# Patient Record
Sex: FEMALE | Race: WHITE | NOT HISPANIC OR LATINO | Employment: OTHER | URBAN - METROPOLITAN AREA
[De-identification: names, ages, dates, MRNs, and addresses within clinical notes are randomized per-mention and may not be internally consistent; named-entity substitution may affect disease eponyms.]

---

## 2018-01-30 ENCOUNTER — OFFICE VISIT (OUTPATIENT)
Dept: OBGYN CLINIC | Facility: HOSPITAL | Age: 73
End: 2018-01-30
Payer: MEDICARE

## 2018-01-30 ENCOUNTER — HOSPITAL ENCOUNTER (OUTPATIENT)
Dept: RADIOLOGY | Facility: HOSPITAL | Age: 73
Discharge: HOME | End: 2018-01-30
Attending: ORTHOPAEDIC SURGERY
Payer: MEDICARE

## 2018-01-30 VITALS
BODY MASS INDEX: 30.86 KG/M2 | HEIGHT: 68 IN | DIASTOLIC BLOOD PRESSURE: 90 MMHG | HEART RATE: 80 BPM | WEIGHT: 203.6 LBS | SYSTOLIC BLOOD PRESSURE: 151 MMHG

## 2018-01-30 DIAGNOSIS — M25.562 LEFT KNEE PAIN, UNSPECIFIED CHRONICITY: ICD-10-CM

## 2018-01-30 DIAGNOSIS — M25.562 LEFT KNEE PAIN, UNSPECIFIED CHRONICITY: Primary | ICD-10-CM

## 2018-01-30 DIAGNOSIS — M25.462 EFFUSION OF LEFT KNEE: ICD-10-CM

## 2018-01-30 DIAGNOSIS — M17.12 PRIMARY OSTEOARTHRITIS OF LEFT KNEE: ICD-10-CM

## 2018-01-30 PROCEDURE — 99203 OFFICE O/P NEW LOW 30 MIN: CPT | Performed by: ORTHOPAEDIC SURGERY

## 2018-01-30 PROCEDURE — 73560 X-RAY EXAM OF KNEE 1 OR 2: CPT

## 2018-01-30 PROCEDURE — 20610 DRAIN/INJ JOINT/BURSA W/O US: CPT | Performed by: ORTHOPAEDIC SURGERY

## 2018-01-30 RX ORDER — LIDOCAINE HYDROCHLORIDE 10 MG/ML
2 INJECTION, SOLUTION INFILTRATION; PERINEURAL
Status: COMPLETED | OUTPATIENT
Start: 2018-01-30 | End: 2018-01-30

## 2018-01-30 RX ORDER — BUPIVACAINE HYDROCHLORIDE 2.5 MG/ML
2 INJECTION, SOLUTION INFILTRATION; PERINEURAL
Status: COMPLETED | OUTPATIENT
Start: 2018-01-30 | End: 2018-01-30

## 2018-01-30 RX ORDER — LOSARTAN POTASSIUM 50 MG/1
75 TABLET ORAL
COMMUNITY
End: 2020-12-11

## 2018-01-30 RX ORDER — BETAMETHASONE SODIUM PHOSPHATE AND BETAMETHASONE ACETATE 3; 3 MG/ML; MG/ML
12 INJECTION, SUSPENSION INTRA-ARTICULAR; INTRALESIONAL; INTRAMUSCULAR; SOFT TISSUE
Status: COMPLETED | OUTPATIENT
Start: 2018-01-30 | End: 2018-01-30

## 2018-01-30 RX ORDER — B-COMPLEX WITH VITAMIN C
1 TABLET ORAL DAILY
COMMUNITY

## 2018-01-30 RX ADMIN — BETAMETHASONE SODIUM PHOSPHATE AND BETAMETHASONE ACETATE 12 MG: 3; 3 INJECTION, SUSPENSION INTRA-ARTICULAR; INTRALESIONAL; INTRAMUSCULAR; SOFT TISSUE at 11:03

## 2018-01-30 RX ADMIN — LIDOCAINE HYDROCHLORIDE 2 ML: 10 INJECTION, SOLUTION INFILTRATION; PERINEURAL at 11:03

## 2018-01-30 RX ADMIN — BUPIVACAINE HYDROCHLORIDE 2 ML: 2.5 INJECTION, SOLUTION INFILTRATION; PERINEURAL at 11:03

## 2018-01-30 NOTE — PROGRESS NOTES
67 y o female presents for evaluation of pain and swelling left knee  A few evenings ago while descending stairs, she felt a pop in her left knee and noted immediate onset of pain and delayed onset of swelling  Due to persistent pain and swelling she is here today for evaluation  She reports pain with the knee in a bent knee position  She feels she further irritated her knee while getting in her car to come to the office for today's appointment  She has a past orthopedic history remarkable for joint reconstructions of a number of arthritic joints, she is fearful her left knee has resulted with arthritis  Review of Systems  Review of systems negative unless otherwise specified in HPI    Past Medical History  Past Medical History:   Diagnosis Date    Hyperlipidemia     Hypertension        Past Surgical History  Past Surgical History:   Procedure Laterality Date    COLOSTOMY  2011    HERNIA REPAIR  2012/2013/2014    HIP SURGERY Right 2010    HIP SURGERY Left 2011       Current Medications  No current outpatient prescriptions on file prior to visit  No current facility-administered medications on file prior to visit  Recent Labs [facility])  @LABALLVALUEIP(HCT:3,HGB:3,PT,INR,WBC:3,ESR,CRP,GLUCOSE,HGBA1C)@      Physical exam  Examination finds gait pattern with a single-point cane  Breathing is nonlabored  Left hip moves well  Left thighs devoid of atrophy  Left knee is neutral slight varus  There are no popliteal masses  There is a modest effusion  Occurs that warmth  She is capable full straight leg raise, flexion exacerbates her pain in his limited  She is nontender along the superior pole patella  Calf compartments are soft and supple  Toes are warm, sensate, mobile    Imaging  X-rays left knee two view show patellofemoral arthritis on the lateral film, fairly well-preserved medial lateral compartments    There may be a fractured osteophyte along the superior pole of patella    Procedure  An aspiration injection was performed on the left knee today      Large joint arthrocentesis  Date/Time: 1/30/2018 11:03 AM  Consent given by: patient  Supporting Documentation  Indications: pain and joint swelling   Procedure Details  Location: knee - L knee  Preparation: Patient was prepped and draped in the usual sterile fashion  Needle size: 16 G  Approach: lateral  Medications administered: 2 mL bupivacaine 0 25 %; 2 mL lidocaine 1 %; 12 mg betamethasone acetate-betamethasone sodium phosphate 6 (3-3) mg/mL    Aspirate amount: 30 mL  Aspirate: clear  Patient tolerance: patient tolerated the procedure well with no immediate complications  Dressing:  Sterile dressing applied          Assessment/Plan:   67 y  o female who presents for evaluation of pain and swelling in her left knee  Aspiration injections is indicated for her effused left knee  It is advised, except, administers outlined above   I would welcome the opportunity see back in 6 weeks time for repeat physical exam

## 2018-01-30 NOTE — PATIENT INSTRUCTIONS
He received an aspiration injection to her left knee today  The corticosteroid which is been injected may take 5-7 days to work    An ice pack intermittently to the left knee today tomorrow may be beneficial

## 2018-02-13 ENCOUNTER — OFFICE VISIT (OUTPATIENT)
Dept: OBGYN CLINIC | Facility: CLINIC | Age: 73
End: 2018-02-13
Payer: MEDICARE

## 2018-02-13 VITALS
WEIGHT: 202 LBS | BODY MASS INDEX: 30.62 KG/M2 | SYSTOLIC BLOOD PRESSURE: 140 MMHG | HEIGHT: 68 IN | DIASTOLIC BLOOD PRESSURE: 90 MMHG

## 2018-02-13 DIAGNOSIS — R30.0 DYSURIA: ICD-10-CM

## 2018-02-13 DIAGNOSIS — Z01.419 ENCOUNTER FOR ANNUAL ROUTINE GYNECOLOGICAL EXAMINATION: Primary | ICD-10-CM

## 2018-02-13 DIAGNOSIS — Z13.820 SCREENING FOR OSTEOPOROSIS: ICD-10-CM

## 2018-02-13 DIAGNOSIS — Z13.820 OSTEOPOROSIS SCREENING: Primary | ICD-10-CM

## 2018-02-13 DIAGNOSIS — Z12.39 BREAST CANCER SCREENING: ICD-10-CM

## 2018-02-13 PROCEDURE — G0145 SCR C/V CYTO,THINLAYER,RESCR: HCPCS | Performed by: OBSTETRICS & GYNECOLOGY

## 2018-02-13 PROCEDURE — 99202 OFFICE O/P NEW SF 15 MIN: CPT | Performed by: OBSTETRICS & GYNECOLOGY

## 2018-02-13 PROCEDURE — G0101 CA SCREEN;PELVIC/BREAST EXAM: HCPCS | Performed by: OBSTETRICS & GYNECOLOGY

## 2018-02-13 PROCEDURE — 87624 HPV HI-RISK TYP POOLED RSLT: CPT | Performed by: OBSTETRICS & GYNECOLOGY

## 2018-02-13 NOTE — PROGRESS NOTES
Assessment/Plan:    Pap smear done with HPV  Encouraged self-breast examination as well as calcium supplementation  Recommend screening mammogram   Discussed osteoporosis screening  Patient has never had a DEXA scan  She will check see if this is covered by her insurance  Recommend checking urinalysis due to dysuria  She will continue to follow-up with her family physician as scheduled  She also has follow-up with Orthopedics  No problem-specific Assessment & Plan notes found for this encounter  Diagnoses and all orders for this visit:    Encounter for annual routine gynecological examination    Breast cancer screening  -     Mammo screening bilateral w cad    Screening for osteoporosis  -     DXA bone density spine hip and pelvis    Dysuria  -     Urinalysis with microscopic          Subjective:      Patient ID: Karlee Staff is a 67 y o  female  HPI    This is a 51-year-old female  who presents as a new patient for annual well gyn exam   She went through menopause at age 55  She was never on hormone replacement therapy  She denies any vaginal bleeding or spotting  Her last gyn exam was over 15 years ago  Her last mammogram may have been over 10 years ago  Her last colonoscopy was approximately 4-1/2 years ago  Patient is  times 20 years  She had lost her  to esophageal cancer  The last time she was sexually active was 10 years ago, for total of 2 partners in her lifetime  10 years ago she did undergo STD screening through her primary care physician  She denies any vaginal discharge  She does complain of burning with urination  She does not have a history of bladder infections or yeast infections  Patient does work full-time as a therapist   She is also recovering from a left knee injury  She has had to significantly slowed down due to this pain and admits that her depression scale score today is certainly lower because of her injury    She does have a significant past surgical history starting with bilateral hip replacements  4 weeks after her hip replacement she developed an acute abdomen due to bowel perforation and underwent bowel surgery with colostomy followed by reversal 6 months later  After her bowel surgery she developed an incarcerated incisional hernia which required further surgery  The following portions of the patient's history were reviewed and updated as appropriate: allergies, current medications, past family history, past medical history, past social history, past surgical history and problem list     Review of Systems   Constitutional: Negative for fatigue, fever and unexpected weight change  Respiratory: Negative for cough, chest tightness, shortness of breath and wheezing  Cardiovascular: Negative  Negative for chest pain and palpitations  Gastrointestinal: Negative  Negative for abdominal distention, abdominal pain, blood in stool, constipation, diarrhea, nausea and vomiting  Genitourinary: Positive for dysuria  Negative for difficulty urinating, dyspareunia, flank pain, frequency, genital sores, hematuria, pelvic pain, urgency, vaginal bleeding, vaginal discharge and vaginal pain  Skin: Negative for rash  Objective:    Vitals:    02/13/18 1428   BP: 140/90        Physical Exam   Constitutional: She appears well-developed and well-nourished  Cardiovascular: Normal rate and regular rhythm  Pulmonary/Chest: Effort normal and breath sounds normal  Right breast exhibits no inverted nipple, no mass, no nipple discharge, no skin change and no tenderness  Left breast exhibits no inverted nipple, no mass, no nipple discharge, no skin change and no tenderness  Abdominal: Soft  Bowel sounds are normal  She exhibits no distension  There is no tenderness  There is no rebound and no guarding  Vertical midline scar noted     Genitourinary: Rectum normal, vagina normal and uterus normal  There is no lesion on the right labia  There is no lesion on the left labia  Cervix exhibits no motion tenderness, no discharge and no friability  Right adnexum displays no mass, no tenderness and no fullness  Left adnexum displays no mass, no tenderness and no fullness  No vaginal discharge found     Genitourinary Comments: Vaginal atrophy noted

## 2018-02-19 LAB
HPV RRNA GENITAL QL NAA+PROBE: NORMAL
LAB AP GYN PRIMARY INTERPRETATION: NORMAL
Lab: NORMAL

## 2018-03-08 ENCOUNTER — TRANSCRIBE ORDERS (OUTPATIENT)
Dept: RADIOLOGY | Facility: CLINIC | Age: 73
End: 2018-03-08

## 2018-03-09 ENCOUNTER — HOSPITAL ENCOUNTER (OUTPATIENT)
Dept: RADIOLOGY | Age: 73
Discharge: HOME | End: 2018-03-09
Payer: MEDICARE

## 2018-03-09 ENCOUNTER — APPOINTMENT (OUTPATIENT)
Dept: LAB | Age: 73
End: 2018-03-09
Payer: MEDICARE

## 2018-03-09 LAB
BACTERIA UR QL AUTO: NORMAL /HPF
BILIRUB UR QL STRIP: NEGATIVE
CLARITY UR: CLEAR
COLOR UR: YELLOW
GLUCOSE UR STRIP-MCNC: NEGATIVE MG/DL
HGB UR QL STRIP.AUTO: NEGATIVE
KETONES UR STRIP-MCNC: NEGATIVE MG/DL
LEUKOCYTE ESTERASE UR QL STRIP: NEGATIVE
NITRITE UR QL STRIP: NEGATIVE
NON-SQ EPI CELLS URNS QL MICRO: NORMAL /HPF
PH UR STRIP.AUTO: 6.5 [PH] (ref 4.5–8)
PROT UR STRIP-MCNC: NEGATIVE MG/DL
RBC #/AREA URNS AUTO: NORMAL /HPF
SP GR UR STRIP.AUTO: 1.02 (ref 1–1.03)
UROBILINOGEN UR QL STRIP.AUTO: 1 E.U./DL
WBC #/AREA URNS AUTO: NORMAL /HPF

## 2018-03-09 PROCEDURE — 77067 SCR MAMMO BI INCL CAD: CPT

## 2018-03-09 PROCEDURE — 77080 DXA BONE DENSITY AXIAL: CPT

## 2018-03-09 PROCEDURE — 81001 URINALYSIS AUTO W/SCOPE: CPT | Performed by: OBSTETRICS & GYNECOLOGY

## 2018-03-12 ENCOUNTER — TELEPHONE (OUTPATIENT)
Dept: OBGYN CLINIC | Facility: CLINIC | Age: 73
End: 2018-03-12

## 2018-03-12 NOTE — TELEPHONE ENCOUNTER
----- Message from Joi Bennett DO sent at 3/12/2018 10:45 AM EDT -----  Inform pt UA normal, also Dexa reveals osteopenia, rec calcium with vitamin D daily and repeat Dexa in 2 years

## 2018-03-13 ENCOUNTER — OFFICE VISIT (OUTPATIENT)
Dept: OBGYN CLINIC | Facility: HOSPITAL | Age: 73
End: 2018-03-13
Payer: MEDICARE

## 2018-03-13 VITALS
BODY MASS INDEX: 31.4 KG/M2 | SYSTOLIC BLOOD PRESSURE: 138 MMHG | HEIGHT: 68 IN | WEIGHT: 207.2 LBS | HEART RATE: 70 BPM | DIASTOLIC BLOOD PRESSURE: 82 MMHG

## 2018-03-13 DIAGNOSIS — M17.12 PRIMARY OSTEOARTHRITIS OF LEFT KNEE: Primary | ICD-10-CM

## 2018-03-13 PROCEDURE — 99213 OFFICE O/P EST LOW 20 MIN: CPT | Performed by: ORTHOPAEDIC SURGERY

## 2018-03-13 RX ORDER — ESCITALOPRAM OXALATE 5 MG/1
5 TABLET ORAL DAILY
COMMUNITY
End: 2018-07-31

## 2018-03-13 NOTE — PROGRESS NOTES
72 y o female who follows up 6 weeks following aspiration injection of an effused arthritic left knee joint  She describes very little relief of her symptoms  She has predictable pain at the left knee joint, the pain is made worse bearing weight, the pain increases with increased activities  Ascending and descending steps continued to be very difficult for this individual     Review of Systems  Review of systems negative unless otherwise specified in HPI    Past Medical History  Past Medical History:   Diagnosis Date    Abnormal Pap smear of cervix     Hyperlipidemia     Hypertension     Incisional hernia        Past Surgical History  Past Surgical History:   Procedure Laterality Date    COLOSTOMY  2011    partial, end, distal segment closure    ENTEROSTOMY CLOSURE      with anastomosis    HERNIA REPAIR  2012/2013/2014    HIP SURGERY Right 2010    HIP SURGERY Left 2011    INCISIONAL HERNIA REPAIR      Incarcerated    TOTAL HIP ARTHROPLASTY Bilateral     2011, 2012       Current Medications  Current Outpatient Prescriptions on File Prior to Visit   Medication Sig Dispense Refill    aspirin 81 MG tablet Take 2 tablets by mouth daily      B Complex Vitamins (VITAMIN B COMPLEX) TABS Take 1 tablet by mouth daily      Cholecalciferol ([employer]S VITAMIN D) 2000 units CAPS Take by mouth daily      Coenzyme Q10 (CO Q 10 PO) Take by mouth      losartan (COZAAR) 50 mg tablet Take 75 mg by mouth      Probiotic Product (ALIGN PO) Take by mouth       No current facility-administered medications on file prior to visit  Recent Labs [facility])    0  Lab Value Date/Time   HCT 38 9 03/16/2015 0917   HGB 13 0 03/16/2015 0917   WBC 6 14 03/16/2015 0917   GLUCOSE 109 03/16/2015 0917         Physical exam  Gait pattern has very little antalgia  Breathing is nonlabored  Left hip moves well  Left thighs devoid of atrophy left knee is in valgus  There is no effusion    There is bony enlargement tenderness laterally  There is crepitation with flexion-extension  There is no palpable warmth of the synovium  Calf compartments are soft supple  Toes are warm, sensate, mobile  Imaging  X-rays from prior show patellofemoral arthritis on the lateral film, and lateral compartment arthritis on the AP film    Procedure  None indicated or performed today    Assessment/Plan:   67 y  o female with by compartment arthritis in the left knee that remained symptomatic despite the distal support, and injections corticosteroid  Viscosupplementation is indicated    We will range of preserve process, I would welcome the opportunity see back in the office with the preserve process been complete in order to initiate a 3 shot series of viscosupplementation to the left knee

## 2018-03-15 ENCOUNTER — TELEPHONE (OUTPATIENT)
Dept: OBGYN CLINIC | Facility: CLINIC | Age: 73
End: 2018-03-15

## 2018-03-15 NOTE — PROGRESS NOTES
Pt informed of results, discussed osteopenia supplements and lifestyle changes  All questions and concerns addressed  Pt verbalized understanding to teaching

## 2018-03-15 NOTE — TELEPHONE ENCOUNTER
----- Message from Onelia Mcfarland DO sent at 3/12/2018 10:45 AM EDT -----  Inform pt UA normal, also Dexa reveals osteopenia, rec calcium with vitamin D daily and repeat Dexa in 2 years

## 2018-04-17 ENCOUNTER — OFFICE VISIT (OUTPATIENT)
Dept: OBGYN CLINIC | Facility: HOSPITAL | Age: 73
End: 2018-04-17
Payer: MEDICARE

## 2018-04-17 VITALS
DIASTOLIC BLOOD PRESSURE: 79 MMHG | WEIGHT: 206 LBS | HEART RATE: 67 BPM | BODY MASS INDEX: 31.22 KG/M2 | SYSTOLIC BLOOD PRESSURE: 134 MMHG | HEIGHT: 68 IN

## 2018-04-17 DIAGNOSIS — M17.12 LOCALIZED OSTEOARTHRITIS OF LEFT KNEE: Primary | ICD-10-CM

## 2018-04-17 DIAGNOSIS — G89.29 CHRONIC PAIN OF LEFT KNEE: ICD-10-CM

## 2018-04-17 DIAGNOSIS — M25.562 CHRONIC PAIN OF LEFT KNEE: ICD-10-CM

## 2018-04-17 PROCEDURE — 20610 DRAIN/INJ JOINT/BURSA W/O US: CPT

## 2018-04-17 PROCEDURE — 99213 OFFICE O/P EST LOW 20 MIN: CPT | Performed by: ORTHOPAEDIC SURGERY

## 2018-04-17 NOTE — PROGRESS NOTES
67 y o female presents for re-evaluation of her left knee, known Oa  At her visit on 1/30/18 her knee was aspirated and injected with cortisone without relief  Her knee is stiff after prolonged sitting and stairs bother her  Her knee will ache/throb at night  She works in a [employer] and sits most of her day  She is contemplating a total knee  Review of Systems  Review of systems negative unless otherwise specified in HPI    Past Medical History  Past Medical History:   Diagnosis Date    Abnormal Pap smear of cervix     Hyperlipidemia     Hypertension     Incisional hernia        Past Surgical History  Past Surgical History:   Procedure Laterality Date    COLOSTOMY  2011    partial, end, distal segment closure    ENTEROSTOMY CLOSURE      with anastomosis    HERNIA REPAIR  2012/2013/2014    HIP SURGERY Right 2010    HIP SURGERY Left 2011    INCISIONAL HERNIA REPAIR      Incarcerated    TOTAL HIP ARTHROPLASTY Bilateral     2011, 2012       Current Medications  Current Outpatient Prescriptions on File Prior to Visit   Medication Sig Dispense Refill    aspirin 81 MG tablet Take 2 tablets by mouth daily      B Complex Vitamins (VITAMIN B COMPLEX) TABS Take 1 tablet by mouth daily      Cholecalciferol (CVS VITAMIN D) 2000 units CAPS Take by mouth daily      Coenzyme Q10 (CO Q 10 PO) Take by mouth      escitalopram (LEXAPRO) 5 mg tablet Take 5 mg by mouth daily      losartan (COZAAR) 50 mg tablet Take 75 mg by mouth      Probiotic Product (ALIGN PO) Take by mouth       No current facility-administered medications on file prior to visit          Recent Labs [facility])    0  Lab Value Date/Time   HCT 38 9 03/16/2015 0917   HGB 13 0 03/16/2015 0917   WBC 6 14 03/16/2015 0917   GLUCOSE 109 03/16/2015 0917         Physical exam  · General: Awake, Alert, Oriented  · Eyes: Pupils equal, round and reactive to light  · Heart: regular rate and rhythm  · Lungs: No audible wheezing  · Abdomen: soft  left Knee exam  Left knee is neutral slight varus  Calf compartments are soft and supple  Mild swelling but no effusion  Moderate crepitus  ROM is WNL with pain at flexion  No ligamentous laxity      Procedure  Large joint arthrocentesis  Date/Time: 4/17/2018 8:35 AM  Consent given by: patient  Site marked: site marked  Supporting Documentation  Indications: pain   Procedure Details  Location: knee - L knee  Preparation: Patient was prepped and draped in the usual sterile fashion  Needle size: 22 G  Ultrasound guidance: no  Approach: anterolateral  Medications administered: 30 mg sodium hyaluronate 30 mg/2 mL    Patient tolerance: patient tolerated the procedure well with no immediate complications  Dressing:  Sterile dressing applied          Imaging      ASSESSMENT  67yo female with known left knee chronic pain and Oa  Failed relief with cortisone injection, OrthoVisco #1 administered to her left knee today  PLAN  #1 orthovisc today, follow-up each of the next 2 weeks to complete the series    ICE after today's injection  Activities as tolerated  WBAT  Follow-up in 1 week for orthovisc #2 left knee

## 2018-04-24 ENCOUNTER — OFFICE VISIT (OUTPATIENT)
Dept: OBGYN CLINIC | Facility: HOSPITAL | Age: 73
End: 2018-04-24
Payer: MEDICARE

## 2018-04-24 VITALS
SYSTOLIC BLOOD PRESSURE: 151 MMHG | HEIGHT: 68 IN | DIASTOLIC BLOOD PRESSURE: 82 MMHG | HEART RATE: 80 BPM | WEIGHT: 205.91 LBS | BODY MASS INDEX: 31.21 KG/M2

## 2018-04-24 DIAGNOSIS — M25.562 CHRONIC PAIN OF LEFT KNEE: Primary | ICD-10-CM

## 2018-04-24 DIAGNOSIS — M17.12 ARTHRITIS OF LEFT KNEE: ICD-10-CM

## 2018-04-24 DIAGNOSIS — G89.29 CHRONIC PAIN OF LEFT KNEE: Primary | ICD-10-CM

## 2018-04-24 PROCEDURE — 20610 DRAIN/INJ JOINT/BURSA W/O US: CPT | Performed by: ORTHOPAEDIC SURGERY

## 2018-04-24 NOTE — PROGRESS NOTES
Subjective; Ongoing treatment with Orthovisc brand Visco supplement  She is receiving at 3 shot series and presents the office today for injection 2 of the 3 shot series  She reports no significant improvement in her pain  She has had no changes in her medical history since she was last seen in the office  Large joint arthrocentesis  Date/Time: 4/24/2018 9:15 AM  Supporting Documentation  Indications: pain   Procedure Details  Location: knee -   Needle size: 22 G  Ultrasound guidance: no  Approach: anterolateral  Medications administered: 30 mg sodium hyaluronate 30 mg/2 mL    Patient tolerance: patient tolerated the procedure well with no immediate complications  Dressing:  Sterile dressing applied      Impression; Left knee pain,    left knee arthritis,    Predominantly patellofemoral     Plan;    Orthovisc injection 2  Performed at today's visit  She was afforded post injection counseling  She returns next week for the final injection of the 3 shot series  Her treatment was supervised by the attending surgeon    It was my privilege to assist him

## 2018-05-01 ENCOUNTER — OFFICE VISIT (OUTPATIENT)
Dept: OBGYN CLINIC | Facility: HOSPITAL | Age: 73
End: 2018-05-01
Payer: MEDICARE

## 2018-05-01 VITALS
SYSTOLIC BLOOD PRESSURE: 163 MMHG | BODY MASS INDEX: 32.65 KG/M2 | HEIGHT: 67 IN | HEART RATE: 66 BPM | WEIGHT: 208 LBS | DIASTOLIC BLOOD PRESSURE: 88 MMHG

## 2018-05-01 DIAGNOSIS — M17.12 PRIMARY OSTEOARTHRITIS OF LEFT KNEE: Primary | ICD-10-CM

## 2018-05-01 PROCEDURE — 20610 DRAIN/INJ JOINT/BURSA W/O US: CPT | Performed by: ORTHOPAEDIC SURGERY

## 2018-05-01 NOTE — PROGRESS NOTES
67 y o female Presenting back for patellofemoral arthritis in her left knee  Patient presents today for her final of 3 series of Visco supplementation  She reports improvements of a  75%  She denied any complications following the previous injections  She denies any recent fevers or chills    Review of Systems  Review of systems negative unless otherwise specified in HPI    Past Medical History  Past Medical History:   Diagnosis Date    Abnormal Pap smear of cervix     Hyperlipidemia     Hypertension     Incisional hernia        Past Surgical History  Past Surgical History:   Procedure Laterality Date    COLOSTOMY  2011    partial, end, distal segment closure    ENTEROSTOMY CLOSURE      with anastomosis    HERNIA REPAIR  2012/2013/2014    HIP SURGERY Right 2010    HIP SURGERY Left 2011    INCISIONAL HERNIA REPAIR      Incarcerated    TOTAL HIP ARTHROPLASTY Bilateral     2011, 2012       Current Medications  Current Outpatient Prescriptions on File Prior to Visit   Medication Sig Dispense Refill    aspirin 81 MG tablet Take 2 tablets by mouth daily      B Complex Vitamins (VITAMIN B COMPLEX) TABS Take 1 tablet by mouth daily      Cholecalciferol (CVS VITAMIN D) 2000 units CAPS Take by mouth daily      Coenzyme Q10 (CO Q 10 PO) Take by mouth      escitalopram (LEXAPRO) 5 mg tablet Take 5 mg by mouth daily      losartan (COZAAR) 50 mg tablet Take 75 mg by mouth      Probiotic Product (ALIGN PO) Take by mouth       No current facility-administered medications on file prior to visit          Recent Labs [facility])    0  Lab Value Date/Time   HCT 38 9 03/16/2015 0917   HGB 13 0 03/16/2015 0917   WBC 6 14 03/16/2015 0917   GLUCOSE 109 03/16/2015 0917         Physical exam  · General: Awake, Alert, Oriented  · Eyes: Pupils equal, round and reactive to light  · Heart: regular rate and rhythm  · Lungs: No audible wheezing  · Abdomen: soft    Left knee has no obvious effusion or increase in warmth  Patient does have some mild chatter was motion  Patient has no yisle instability  The quadriceps is Devoid of atrophy  Warm sensate extremity distally  Palpable pulses        Procedure  Left knee was appropriately cleaned and anesthetized  Anterolateral aspect of the knee was injected with final injection orthosis    Large joint arthrocentesis  Date/Time: 5/1/2018 9:56 AM  Consent given by: patient  Supporting Documentation  Indications: pain   Procedure Details  Location: knee - L knee  Needle size: 25 G  Approach: anterolateral  Medications administered: 30 mg sodium hyaluronate 30 mg/2 mL    Patient tolerance: patient tolerated the procedure well with no immediate complications            Assessment/Plan:   67 y  o female With patellofemoral arthritis undergoing conservative treatment    Weightbearing as tolerated  Avoid aggressive activities  Follow-up 3 months

## 2018-07-31 ENCOUNTER — OFFICE VISIT (OUTPATIENT)
Dept: OBGYN CLINIC | Facility: HOSPITAL | Age: 73
End: 2018-07-31
Payer: MEDICARE

## 2018-07-31 VITALS
DIASTOLIC BLOOD PRESSURE: 87 MMHG | HEIGHT: 67 IN | HEART RATE: 60 BPM | BODY MASS INDEX: 30.61 KG/M2 | WEIGHT: 195 LBS | SYSTOLIC BLOOD PRESSURE: 144 MMHG

## 2018-07-31 DIAGNOSIS — S80.01XA CONTUSION OF RIGHT KNEE, INITIAL ENCOUNTER: ICD-10-CM

## 2018-07-31 DIAGNOSIS — M17.12 PRIMARY OSTEOARTHRITIS OF LEFT KNEE: Primary | ICD-10-CM

## 2018-07-31 PROCEDURE — 99213 OFFICE O/P EST LOW 20 MIN: CPT | Performed by: STUDENT IN AN ORGANIZED HEALTH CARE EDUCATION/TRAINING PROGRAM

## 2018-07-31 RX ORDER — ESCITALOPRAM OXALATE 10 MG/1
10 TABLET ORAL
Refills: 6 | COMMUNITY
Start: 2018-06-27 | End: 2020-12-11

## 2018-07-31 NOTE — PROGRESS NOTES
67 y o female presents for continued evaluation of left knee arthritis  She completed a a round of viscosupplementation nearly 4 months ago and is still receiving significant symptom relief  She is able to go about her daily activities with very little discomfort  She does admit to sustaining a mechanical fall a couple weeks ago in which she hit her right knee which caused some soreness, but this is improving  She has no other complaints at this time  Review of Systems  Review of systems negative unless otherwise specified in HPI    Past Medical History  Past Medical History:   Diagnosis Date    Abnormal Pap smear of cervix     Hyperlipidemia     Hypertension     Incisional hernia        Past Surgical History  Past Surgical History:   Procedure Laterality Date    COLOSTOMY  2011    partial, end, distal segment closure    ENTEROSTOMY CLOSURE      with anastomosis    HERNIA REPAIR  2012/2013/2014    HIP SURGERY Right 2010    HIP SURGERY Left 2011    INCISIONAL HERNIA REPAIR      Incarcerated    TOTAL HIP ARTHROPLASTY Bilateral     2011, 2012       Current Medications  Current Outpatient Prescriptions on File Prior to Visit   Medication Sig Dispense Refill    aspirin 81 MG tablet Take 2 tablets by mouth daily      B Complex Vitamins (VITAMIN B COMPLEX) TABS Take 1 tablet by mouth daily      Cholecalciferol (CVS VITAMIN D) 2000 units CAPS Take by mouth daily      Coenzyme Q10 (CO Q 10 PO) Take by mouth      losartan (COZAAR) 50 mg tablet Take 75 mg by mouth      Probiotic Product (ALIGN PO) Take by mouth      [DISCONTINUED] escitalopram (LEXAPRO) 5 mg tablet Take 5 mg by mouth daily       No current facility-administered medications on file prior to visit          Recent Labs [facility])    0  Lab Value Date/Time   HCT 38 9 03/16/2015 0917   HGB 13 0 03/16/2015 0917   WBC 6 14 03/16/2015 0917   GLUCOSE 109 03/16/2015 0917         Physical exam  · General: Awake, Alert, Oriented  · Eyes: Pupils equal, round and reactive to light  · Heart: regular rate and rhythm  · Lungs: No audible wheezing  · Abdomen: soft  left Knee exam  · Skin intact  · No effusion   · No JLT  · Stable to varus/valgus stress  · 0-130 degrees of flexion   · Motor ans sensation intact   · Limb warm and well perfused     Imaging  No new radiographs obtained at this visit    66 yo female with left knee patellofemoral OA  · WBAT LLE with activity modification to limit impact  · PO anti-inflammatories PRN for pain   · Local modalities   · PT for quad strengthening   · Please follow-up in 3 months for next round of viscosupplementation

## 2018-11-06 ENCOUNTER — OFFICE VISIT (OUTPATIENT)
Dept: OBGYN CLINIC | Facility: HOSPITAL | Age: 73
End: 2018-11-06
Payer: MEDICARE

## 2018-11-06 ENCOUNTER — HOSPITAL ENCOUNTER (OUTPATIENT)
Dept: RADIOLOGY | Facility: HOSPITAL | Age: 73
Discharge: HOME | End: 2018-11-06
Payer: MEDICARE

## 2018-11-06 VITALS
SYSTOLIC BLOOD PRESSURE: 148 MMHG | HEIGHT: 67 IN | DIASTOLIC BLOOD PRESSURE: 82 MMHG | HEART RATE: 65 BPM | WEIGHT: 195 LBS | BODY MASS INDEX: 30.61 KG/M2

## 2018-11-06 DIAGNOSIS — M17.12 PRIMARY OSTEOARTHRITIS OF LEFT KNEE: ICD-10-CM

## 2018-11-06 DIAGNOSIS — M25.561 RIGHT KNEE PAIN, UNSPECIFIED CHRONICITY: ICD-10-CM

## 2018-11-06 DIAGNOSIS — M25.561 RIGHT KNEE PAIN, UNSPECIFIED CHRONICITY: Primary | ICD-10-CM

## 2018-11-06 DIAGNOSIS — M17.12 LOCALIZED OSTEOARTHRITIS OF LEFT KNEE: ICD-10-CM

## 2018-11-06 PROCEDURE — 73560 X-RAY EXAM OF KNEE 1 OR 2: CPT

## 2018-11-06 PROCEDURE — 99213 OFFICE O/P EST LOW 20 MIN: CPT | Performed by: ORTHOPAEDIC SURGERY

## 2018-11-06 NOTE — PROGRESS NOTES
ASSESSMENT/PLAN:    Assessment:   Left knee DJD    Plan:   Patient responded very well with viscosupplementation  She is planning to go to [facility] in May and is interested in getting repeat visco injections prior to this trip  She is also interested in getting visco for her right knee as well  Therapy has made both knees feel better    Follow Up:  Prior to her trip for repeat injections    _____________________________________________________  CHIEF COMPLAINT:  Chief Complaint   Patient presents with    Left Knee - Follow-up         SUBJECTIVE:  Herman Treviño is a 67y o  year old female who presents for follow up regarding b/l knee pain  Left knee has received visco injection series which she states greatly helped relieve her knee pain and she is still not having pain in this knee  She continues to have some discomfort in the right knee, however, after a fall  States therapy to strengthen her muscles has made a difference       PAST MEDICAL HISTORY:  Past Medical History:   Diagnosis Date    Abnormal Pap smear of cervix     Hyperlipidemia     Hypertension     Incisional hernia        PAST SURGICAL HISTORY:  Past Surgical History:   Procedure Laterality Date    COLOSTOMY  2011    partial, end, distal segment closure    ENTEROSTOMY CLOSURE      with anastomosis    HERNIA REPAIR  2012/2013/2014    HIP SURGERY Right 2010    HIP SURGERY Left 2011    INCISIONAL HERNIA REPAIR      Incarcerated    TOTAL HIP ARTHROPLASTY Bilateral     2011, 2012       FAMILY HISTORY:  Family History   Problem Relation Age of Onset    Heart disease Mother     Coronary artery disease Mother     Stroke Mother     Hypertension Mother     Heart failure Father     Coronary artery disease Father        SOCIAL HISTORY:  Social History   Substance Use Topics    Smoking status: Former Smoker    Smokeless tobacco: Never Used    Alcohol use Yes      Comment: weekly       MEDICATIONS:    Current Outpatient Prescriptions:    aspirin 81 MG tablet, Take 2 tablets by mouth daily, Disp: , Rfl:     B Complex Vitamins (VITAMIN B COMPLEX) TABS, Take 1 tablet by mouth daily, Disp: , Rfl:     Cholecalciferol (CVS VITAMIN D) 2000 units CAPS, Take by mouth daily, Disp: , Rfl:     Coenzyme Q10 (CO Q 10 PO), Take by mouth, Disp: , Rfl:     escitalopram (LEXAPRO) 10 mg tablet, Take 10 mg by mouth daily at bedtime, Disp: , Rfl: 6    losartan (COZAAR) 50 mg tablet, Take 75 mg by mouth, Disp: , Rfl:     Probiotic Product (ALIGN PO), Take by mouth, Disp: , Rfl:     ALLERGIES:  Allergies   Allergen Reactions    Iodides     Iodine     Other     Shellfish-Derived Products        REVIEW OF SYSTEMS:  Pertinent items are noted in HPI  A comprehensive review of systems was negative  LABS:  HgA1c: No results found for: HGBA1C  BMP:   Lab Results   Component Value Date    GLUCOSE 109 03/16/2015    CALCIUM 8 5 03/16/2015     03/16/2015    K 4 3 03/16/2015    CO2 30 03/16/2015     03/16/2015    BUN 20 03/16/2015    CREATININE 0 86 03/16/2015           _____________________________________________________  PHYSICAL EXAMINATION:  General: well developed and well nourished, alert, oriented times 3 and appears comfortable  Psychiatric: Normal  HEENT: Trachea Midline, No torticollis  Cardiovascular: No discernable arrhythmia  Pulmonary: No wheezing or stridor  Skin: No masses, erthema, lacerations, fluctation, ulcerations  Neurovascular: Motor/sensation grossly intact    MUSCULOSKELETAL EXAMINATION:  LEFT SIDE:  Knee: Valgus deformity  Nontender to medial and lateral compartments  She has + crepitation under the patella  FROM  No instability to valgus/varus stress  RIGHT SIDE:  Knee: Valgus deformity  Nontender to medial and lateral compartments  She has + crepitation under the patella  FROM   No instability to valgus/varus stress    _____________________________________________________  STUDIES REVIEWED:  Images were reviewd in PACS: Xrays of the right knee show some degeneration in the knee, primarily in the medial and patellofemoral compartments      PROCEDURES PERFORMED:  Procedures  No Procedures performed today

## 2018-11-09 ENCOUNTER — TELEPHONE (OUTPATIENT)
Dept: OBGYN CLINIC | Facility: OTHER | Age: 73
End: 2018-11-09

## 2018-11-09 NOTE — TELEPHONE ENCOUNTER
TO BE COMPLETED BY CENTRAL AUTH TEAM:     Physician: DR Juan Chaparro     Medication: [address]    Number of Injections in Series (Appointments scheduled 1 week apart from one another): 3    Schedule after this date: 2/6/19    Billing Info: Buy and Bill/Specialty Pharmacy-Patient Supply  OFFICE MAY BUY & BILL     Appointment Message Line:     B/L KNEE ORTHOVISC #1-3 (BUY& BILL)    (please copy and paste appointment message line when scheduling appointment)    Additional Comments: LFT MSG TO CALL BACK AND SCHEDULE VISCO APPT

## 2018-11-09 NOTE — TELEPHONE ENCOUNTER
TO BE COMPLETED BY CENTRAL AUTH TEAM:     Physician: DR Yoli Renner     Medication: [address]     Number of Injections in Series (Appointments scheduled 1 week apart from one another): 3    Schedule after this date:      Billing Info: Buy and Bill/Specialty Pharmacy-Patient Supply    Appointment Message Line:  (please copy and paste appointment message line when scheduling appointment)    Additional Comments:

## 2019-02-19 ENCOUNTER — OFFICE VISIT (OUTPATIENT)
Dept: OBGYN CLINIC | Facility: HOSPITAL | Age: 74
End: 2019-02-19
Payer: MEDICARE

## 2019-02-19 VITALS
HEIGHT: 67 IN | BODY MASS INDEX: 30.7 KG/M2 | DIASTOLIC BLOOD PRESSURE: 85 MMHG | SYSTOLIC BLOOD PRESSURE: 148 MMHG | WEIGHT: 195.6 LBS | HEART RATE: 76 BPM

## 2019-02-19 DIAGNOSIS — G89.29 CHRONIC PAIN OF RIGHT KNEE: ICD-10-CM

## 2019-02-19 DIAGNOSIS — M17.12 PRIMARY OSTEOARTHRITIS OF LEFT KNEE: Primary | ICD-10-CM

## 2019-02-19 DIAGNOSIS — M25.561 CHRONIC PAIN OF RIGHT KNEE: ICD-10-CM

## 2019-02-19 DIAGNOSIS — M25.562 CHRONIC PAIN OF LEFT KNEE: ICD-10-CM

## 2019-02-19 DIAGNOSIS — G89.29 CHRONIC PAIN OF LEFT KNEE: ICD-10-CM

## 2019-02-19 DIAGNOSIS — M17.11 PRIMARY OSTEOARTHRITIS OF RIGHT KNEE: ICD-10-CM

## 2019-02-19 PROCEDURE — 20610 DRAIN/INJ JOINT/BURSA W/O US: CPT | Performed by: ORTHOPAEDIC SURGERY

## 2019-02-19 NOTE — PROGRESS NOTES
Assessment:  1  Primary osteoarthritis of left knee  Large joint arthrocentesis: L knee   2  Chronic pain of left knee  Large joint arthrocentesis: L knee   3  Primary osteoarthritis of right knee  Large joint arthrocentesis: R knee   4  Chronic pain of right knee  Large joint arthrocentesis: R knee       Plan:  Bilateral knees known osteoarthritis who presents for initiation of the Orthovisc series  Orthovisc #1 injected to both knees today  Ice and post injection protocol advised  Weightbearing activities as tolerated  Follow up each of the next 2 weeks to complete the series  To do next visit:  Return in about 1 week (around 2/26/2019) for Orthovisc #2 B/L knees  The above stated was discussed in layman's terms and the patient expressed understanding  All questions were answered to the patient's satisfaction  Scribe Attestation    I,:   Marisela Green am acting as a scribe while in the presence of the attending physician :        I,:   Ej Phillip MD personally performed the services described in this documentation    as scribed in my presence :              Subjective:   Ermelinda Fonseca is a 68 y o  female who presents for initiation of Visco supplementation injection series for both knees for ongoing treatment of known osteoarthritis  Her last series was completed greater than 7 months ago with dramatic improvement of her symptoms  She does note bearing degree of discomfort medially at both knees with prolonged weight-bearing activities  No new symptoms or complaints        Review of systems negative unless otherwise specified in HPI    Past Medical History:   Diagnosis Date    Abnormal Pap smear of cervix     Hyperlipidemia     Hypertension     Incisional hernia        Past Surgical History:   Procedure Laterality Date    COLOSTOMY  2011    partial, end, distal segment closure    ENTEROSTOMY CLOSURE      with anastomosis    HERNIA REPAIR  2012/2013/2014    HIP SURGERY Right 2010  HIP SURGERY Left 2011    INCISIONAL HERNIA REPAIR      Incarcerated    TOTAL HIP ARTHROPLASTY Bilateral     2011, 2012       Family History   Problem Relation Age of Onset    Heart disease Mother     Coronary artery disease Mother     Stroke Mother     Hypertension Mother     Heart failure Father     Coronary artery disease Father        Social History     Occupational History    Not on file   Tobacco Use    Smoking status: Former Smoker    Smokeless tobacco: Never Used   Substance and Sexual Activity    Alcohol use: Yes     Comment: weekly    Drug use: Not on file    Sexual activity: Never         Current Outpatient Medications:     aspirin 81 MG tablet, Take 2 tablets by mouth daily, Disp: , Rfl:     aspirin 81 MG tablet, Take 162 mg by mouth, Disp: , Rfl:     B Complex Vitamins (VITAMIN B COMPLEX) TABS, Take 1 tablet by mouth daily, Disp: , Rfl:     Cholecalciferol (CVS VITAMIN D) 2000 units CAPS, Take by mouth daily, Disp: , Rfl:     Coenzyme Q10 (CO Q 10 PO), Take by mouth, Disp: , Rfl:     escitalopram (LEXAPRO) 10 mg tablet, Take 10 mg by mouth daily at bedtime, Disp: , Rfl: 6    losartan (COZAAR) 50 mg tablet, Take 75 mg by mouth, Disp: , Rfl:     Probiotic Product (ALIGN PO), Take by mouth, Disp: , Rfl:     Allergies   Allergen Reactions    Iodides     Iodine     Other     Shellfish Allergy     Shellfish-Derived Products             Vitals:    02/19/19 1427   BP: 148/85   Pulse: 76       Objective:                    Right Knee Exam     Tenderness   The patient is experiencing tenderness in the medial joint line  Range of Motion   The patient has normal right knee ROM  Right knee flexion: trace crepitus       Tests   Skyler:  Medial - negative Lateral - negative  Varus: negative Valgus: negative    Other   Erythema: absent  Sensation: normal  Swelling: none  Effusion: no effusion present      Left Knee Exam     Tenderness   The patient is experiencing tenderness in the medial joint line  Range of Motion   The patient has normal left knee ROM  Left knee flexion: trace crepitus       Tests   Skyler:  Medial - negative Lateral - negative  Varus: negative Valgus: negative    Other   Erythema: absent  Sensation: normal  Swelling: none  Effusion: no effusion present            Diagnostics, reviewed and taken today if performed as documented:    None performed          Procedures, if performed today:    Large joint arthrocentesis: L knee  Date/Time: 2/19/2019 2:37 PM  Consent given by: patient  Site marked: site marked  Timeout: Immediately prior to procedure a time out was called to verify the correct patient, procedure, equipment, support staff and site/side marked as required   Supporting Documentation  Indications: pain and diagnostic evaluation   Procedure Details  Location: knee - L knee  Preparation: Patient was prepped and draped in the usual sterile fashion  Needle size: 22 G  Ultrasound guidance: no  Approach: anterolateral  Medications administered: 30 mg sodium hyaluronate 30 mg/2 mL    Patient tolerance: patient tolerated the procedure well with no immediate complications  Dressing:  Sterile dressing applied    Large joint arthrocentesis: R knee  Date/Time: 2/19/2019 2:38 PM  Consent given by: patient  Site marked: site marked  Timeout: Immediately prior to procedure a time out was called to verify the correct patient, procedure, equipment, support staff and site/side marked as required   Supporting Documentation  Indications: pain and diagnostic evaluation   Procedure Details  Location: knee - R knee  Preparation: Patient was prepped and draped in the usual sterile fashion  Needle size: 22 G  Ultrasound guidance: no  Approach: anterolateral  Medications administered: 30 mg sodium hyaluronate 30 mg/2 mL    Patient tolerance: patient tolerated the procedure well with no immediate complications  Dressing:  Sterile dressing applied          Portions of the record may have been created with voice recognition software   Occasional wrong word or "sound a like" substitutions may have occurred due to the inherent limitations of voice recognition software   Read the chart carefully and recognize, using context, where substitutions have occurred

## 2019-02-26 ENCOUNTER — OFFICE VISIT (OUTPATIENT)
Dept: OBGYN CLINIC | Facility: HOSPITAL | Age: 74
End: 2019-02-26
Payer: MEDICARE

## 2019-02-26 VITALS
WEIGHT: 195 LBS | BODY MASS INDEX: 30.61 KG/M2 | DIASTOLIC BLOOD PRESSURE: 93 MMHG | SYSTOLIC BLOOD PRESSURE: 172 MMHG | HEART RATE: 69 BPM | HEIGHT: 67 IN

## 2019-02-26 DIAGNOSIS — M17.12 PRIMARY OSTEOARTHRITIS OF LEFT KNEE: ICD-10-CM

## 2019-02-26 DIAGNOSIS — M17.11 PRIMARY OSTEOARTHRITIS OF RIGHT KNEE: Primary | ICD-10-CM

## 2019-02-26 PROCEDURE — 20610 DRAIN/INJ JOINT/BURSA W/O US: CPT | Performed by: PHYSICIAN ASSISTANT

## 2019-02-26 NOTE — PROGRESS NOTES
Subjective;    63-year-old adult female with  osteoarthritis of the knees  She has been authorized for 3 shot series of Visco supplement to both knees  She presents today for injection of medication of both knees    Past Medical History:   Diagnosis Date    Abnormal Pap smear of cervix     Hyperlipidemia     Hypertension     Incisional hernia        Past Surgical History:   Procedure Laterality Date    COLOSTOMY  2011    partial, end, distal segment closure    ENTEROSTOMY CLOSURE      with anastomosis    HERNIA REPAIR  2012/2013/2014    HIP SURGERY Right 2010    HIP SURGERY Left 2011    [address]      Incarcerated    TOTAL HIP ARTHROPLASTY Bilateral     2011, 2012       Family History   Problem Relation Age of Onset    Heart disease Mother     Coronary artery disease Mother     Stroke Mother     Hypertension Mother     Heart failure Father     Coronary artery disease Father        Social History     Tobacco Use    Smoking status: Former Smoker    Smokeless tobacco: Never Used   Substance Use Topics    Alcohol use: Yes     Comment: weekly    Drug use: Not on file     Exam;    Inspection of both knees find them devoid of any discoloration bruising or erythema   Both knees allow safe administration of medication at today's visit      Large joint arthrocentesis: R knee  Date/Time: 2/26/2019 2:56 PM  Procedure Details  Location: knee - R knee  Medications administered: 30 mg sodium hyaluronate 30 mg/2 mL    Patient tolerance: patient tolerated the procedure well with no immediate complications  Dressing:  Sterile dressing applied    Large joint arthrocentesis: L knee  Date/Time: 2/26/2019 2:58 PM  Procedure Details  Location: knee - L knee  Medications administered: 30 mg sodium hyaluronate 30 mg/2 mL    Patient tolerance: patient tolerated the procedure well with no immediate complications  Dressing:  Sterile dressing applied       Impression;    Bilateral knee osteoarthritis  Bilateral knee pain    Plan;    She received Orthovisc injections to both knees from a medial parapatellar tendon approach    We will see her next week for the 3rd shot of the 3 shot series to both knees  Her experience was supervised by me plan formulated by the attending surgeon it was my privilege to assist him in his delivery

## 2019-03-07 ENCOUNTER — OFFICE VISIT (OUTPATIENT)
Dept: OBGYN CLINIC | Facility: HOSPITAL | Age: 74
End: 2019-03-07
Payer: MEDICARE

## 2019-03-07 VITALS
WEIGHT: 195 LBS | HEIGHT: 67 IN | SYSTOLIC BLOOD PRESSURE: 188 MMHG | BODY MASS INDEX: 30.61 KG/M2 | HEART RATE: 65 BPM | DIASTOLIC BLOOD PRESSURE: 75 MMHG

## 2019-03-07 DIAGNOSIS — M17.12 PRIMARY OSTEOARTHRITIS OF LEFT KNEE: ICD-10-CM

## 2019-03-07 DIAGNOSIS — M17.11 PRIMARY OSTEOARTHRITIS OF RIGHT KNEE: Primary | ICD-10-CM

## 2019-03-07 PROCEDURE — 20610 DRAIN/INJ JOINT/BURSA W/O US: CPT | Performed by: ORTHOPAEDIC SURGERY

## 2019-03-07 NOTE — PROGRESS NOTES
Subjective;    49-year-old female with history of osteoarthritis both knees  She is undergoing a 3 shot regimen of Visco supplementation of both knees  She presents the office today for her final injection 3  Of the 3 shot series  She already feels or is experiencing benefit from the injections    Past Medical History:   Diagnosis Date    Abnormal Pap smear of cervix     Hyperlipidemia     Hypertension     Incisional hernia        Past Surgical History:   Procedure Laterality Date    COLOSTOMY  2011    partial, end, distal segment closure    ENTEROSTOMY CLOSURE      with anastomosis    HERNIA REPAIR  2012/2013/2014    HIP SURGERY Right 2010    HIP SURGERY Left 2011    [address]      Incarcerated    TOTAL HIP ARTHROPLASTY Bilateral     2011, 2012       Family History   Problem Relation Age of Onset    Heart disease Mother     Coronary artery disease Mother     Stroke Mother     Hypertension Mother     Heart failure Father     Coronary artery disease Father        Social History     Tobacco Use    Smoking status: Former Smoker    Smokeless tobacco: Never Used   Substance Use Topics    Alcohol use: Yes     Comment: weekly    Drug use: Not on file     Exam;    Neither knee has any erythema or bruising that would preclude safe administration of medication  No effusion is identified of either knee at today's visit    Large joint arthrocentesis: R knee  Date/Time: 3/7/2019 1:41 PM  Procedure Details  Location: knee - R knee  Medications administered: 30 mg sodium hyaluronate 30 mg/2 mL      Large joint arthrocentesis: L knee  Date/Time: 3/7/2019 1:41 PM  Procedure Details  Location: knee - L knee  Medications administered: 30 mg sodium hyaluronate 30 mg/2 mL         Impression;    Osteoarthritis of both knees  Bilateral knee pain    Plan;      She received injection 3    Of Orthovisc to both knees  Band-Aids were applied to the puncture sites  She was offered a 3 month follow-up in turn she wishes to come back in 6    I will precert her for the next Orthovisc treatment now for 6 months  Her exam was supervised by me plan formulated by the attending surgeon was my privilege to assist him in his delivery

## 2019-04-19 ENCOUNTER — HOSPITAL ENCOUNTER (OUTPATIENT)
Dept: RADIOLOGY | Facility: HOSPITAL | Age: 74
Discharge: HOME | End: 2019-04-19
Payer: MEDICARE

## 2019-04-19 ENCOUNTER — APPOINTMENT (OUTPATIENT)
Dept: LAB | Facility: HOSPITAL | Age: 74
End: 2019-04-19
Payer: MEDICARE

## 2019-04-19 ENCOUNTER — TRANSCRIBE ORDERS (OUTPATIENT)
Dept: LAB | Facility: HOSPITAL | Age: 74
End: 2019-04-19

## 2019-04-19 DIAGNOSIS — R05.9 COUGH: ICD-10-CM

## 2019-04-19 DIAGNOSIS — E66.3 SEVERELY OVERWEIGHT: ICD-10-CM

## 2019-04-19 DIAGNOSIS — D51.3 OTHER DIETARY VITAMIN B12 DEFICIENCY ANEMIA: ICD-10-CM

## 2019-04-19 DIAGNOSIS — Z87.891 PERSONAL HISTORY OF TOBACCO USE, PRESENTING HAZARDS TO HEALTH: ICD-10-CM

## 2019-04-19 DIAGNOSIS — R53.83 OTHER FATIGUE: ICD-10-CM

## 2019-04-19 DIAGNOSIS — R09.82 POSTNASAL DRIP: ICD-10-CM

## 2019-04-19 DIAGNOSIS — M25.50 PAIN IN JOINT, MULTIPLE SITES: ICD-10-CM

## 2019-04-19 DIAGNOSIS — I10 ESSENTIAL HYPERTENSION, MALIGNANT: ICD-10-CM

## 2019-04-19 DIAGNOSIS — R05.9 COUGH: Primary | ICD-10-CM

## 2019-04-19 LAB
ALBUMIN SERPL BCP-MCNC: 3.6 G/DL (ref 3.5–5)
ALP SERPL-CCNC: 59 U/L (ref 46–116)
ALT SERPL W P-5'-P-CCNC: 24 U/L (ref 12–78)
ANION GAP SERPL CALCULATED.3IONS-SCNC: 3 MMOL/L (ref 4–13)
AST SERPL W P-5'-P-CCNC: 15 U/L (ref 5–45)
BILIRUB SERPL-MCNC: 1.06 MG/DL (ref 0.2–1)
BUN SERPL-MCNC: 18 MG/DL (ref 5–25)
CALCIUM SERPL-MCNC: 8.4 MG/DL (ref 8.3–10.1)
CHLORIDE SERPL-SCNC: 107 MMOL/L (ref 100–108)
CO2 SERPL-SCNC: 28 MMOL/L (ref 21–32)
CREAT SERPL-MCNC: 0.87 MG/DL (ref 0.6–1.3)
CRP SERPL HS-MCNC: 1.49 MG/L
ERYTHROCYTE [DISTWIDTH] IN BLOOD BY AUTOMATED COUNT: 13 % (ref 11.6–15.1)
EST. AVERAGE GLUCOSE BLD GHB EST-MCNC: 105 MG/DL
GFR SERPL CREATININE-BSD FRML MDRD: 66 ML/MIN/1.73SQ M
GLUCOSE SERPL-MCNC: 88 MG/DL (ref 65–140)
HBA1C MFR BLD: 5.3 % (ref 4.2–6.3)
HCT VFR BLD AUTO: 40.2 % (ref 34.8–46.1)
HGB BLD-MCNC: 13.1 G/DL (ref 11.5–15.4)
IRON SERPL-MCNC: 93 UG/DL (ref 50–170)
MAGNESIUM SERPL-MCNC: 2.1 MG/DL (ref 1.6–2.6)
MCH RBC QN AUTO: 32.5 PG (ref 26.8–34.3)
MCHC RBC AUTO-ENTMCNC: 32.6 G/DL (ref 31.4–37.4)
MCV RBC AUTO: 100 FL (ref 82–98)
PLATELET # BLD AUTO: 238 THOUSANDS/UL (ref 149–390)
PMV BLD AUTO: 10.1 FL (ref 8.9–12.7)
POTASSIUM SERPL-SCNC: 4.3 MMOL/L (ref 3.5–5.3)
PROT SERPL-MCNC: 6.7 G/DL (ref 6.4–8.2)
RBC # BLD AUTO: 4.03 MILLION/UL (ref 3.81–5.12)
SODIUM SERPL-SCNC: 138 MMOL/L (ref 136–145)
TSH SERPL DL<=0.05 MIU/L-ACNC: 0.73 UIU/ML (ref 0.36–3.74)
VIT B12 SERPL-MCNC: 443 PG/ML (ref 100–900)
WBC # BLD AUTO: 6.04 THOUSAND/UL (ref 4.31–10.16)

## 2019-04-19 PROCEDURE — 71046 X-RAY EXAM CHEST 2 VIEWS: CPT

## 2019-04-19 PROCEDURE — 85027 COMPLETE CBC AUTOMATED: CPT

## 2019-04-19 PROCEDURE — 83036 HEMOGLOBIN GLYCOSYLATED A1C: CPT

## 2019-04-19 PROCEDURE — 83735 ASSAY OF MAGNESIUM: CPT

## 2019-04-19 PROCEDURE — 80053 COMPREHEN METABOLIC PANEL: CPT

## 2019-04-19 PROCEDURE — 84443 ASSAY THYROID STIM HORMONE: CPT

## 2019-04-19 PROCEDURE — 83540 ASSAY OF IRON: CPT

## 2019-04-19 PROCEDURE — 86141 C-REACTIVE PROTEIN HS: CPT

## 2019-04-19 PROCEDURE — 82607 VITAMIN B-12: CPT

## 2019-04-19 PROCEDURE — 36415 COLL VENOUS BLD VENIPUNCTURE: CPT

## 2019-07-20 ENCOUNTER — APPOINTMENT (EMERGENCY)
Dept: RADIOLOGY | Facility: HOSPITAL | Age: 74
End: 2019-07-20
Payer: MEDICARE

## 2019-07-20 ENCOUNTER — HOSPITAL ENCOUNTER (EMERGENCY)
Facility: HOSPITAL | Age: 74
Discharge: HOME | End: 2019-07-20
Attending: EMERGENCY MEDICINE | Admitting: EMERGENCY MEDICINE
Payer: MEDICARE

## 2019-07-20 VITALS
TEMPERATURE: 97.6 F | SYSTOLIC BLOOD PRESSURE: 194 MMHG | OXYGEN SATURATION: 96 % | HEART RATE: 68 BPM | WEIGHT: 190 LBS | BODY MASS INDEX: 29.76 KG/M2 | RESPIRATION RATE: 18 BRPM | DIASTOLIC BLOOD PRESSURE: 90 MMHG

## 2019-07-20 DIAGNOSIS — S22.49XA RIB FRACTURES: ICD-10-CM
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
## 2019-07-20 PROCEDURE — 71101 X-RAY EXAM UNILAT RIBS/CHEST: CPT

## 2019-07-20 PROCEDURE — 99283 EMERGENCY DEPT VISIT LOW MDM: CPT | Performed by: EMERGENCY MEDICINE

## 2019-07-20 PROCEDURE — 99283 EMERGENCY DEPT VISIT LOW MDM: CPT

## 2019-07-20 RX ORDER — METHOCARBAMOL 500 MG/1
500 TABLET, FILM COATED ORAL 2 TIMES DAILY
Qty: 20 TABLET | Refills: 0 | Status: SHIPPED | OUTPATIENT
Start: 2019-07-20 | End: 2020-12-11

## 2019-07-20 NOTE — ED ATTENDING ATTESTATION
I,Morteza Burns MD, saw and evaluated the patient  I have discussed the patient with the resident/non-physician practitioner and agree with the resident's/non-physician practitioner's findings, Plan of Care, and MDM as documented in the resident's/non-physician practitioner's note, except where noted  All available labs and Radiology studies were reviewed  I was present for key portions of any procedure(s) performed by the resident/non-physician practitioner and I was immediately available to provide assistance  At this point I agree with the current assessment done in the Emergency Department  I have conducted an independent evaluation of this patient including a focused history and a physical exam         43-year-old female presenting to the emergency department for evaluation of left mid axillary pain after a fall 2 days ago  Patient states that she was stepping from her deck down 2 wooden steps on to a paved path, but was wearing shoes that had mild on them causing her to slip and fall back, landing primarily on her bottom at the bottom of the stairs, but then fell backwards striking the left posterior lateral chest against the stair  Patient in hit her head and has had negative LOC  Patient has been ambulatory since the time of the fall  Patient is primarily complaining about pain in the posterior to mid axillary line area of the left lateral chest   On examination tender to palpation  No crepitance, subcutaneous emphysema, or hematoma  Breath sounds are clear bilaterally  Plan is x-ray      XR ribs with pa chest min 3 views LEFT    (Results Pending)

## 2019-07-20 NOTE — ED PROVIDER NOTES
History  Chief Complaint   Patient presents with   [place] Fall     Patient reports falling 2 days ago on stairs onto L back  Reports having "bruised ribs in the past"  States she feels like she may have broken a rib  Reports today having a sharp spasm on L side  HPI  Patient is a 71-year-old female past medical history hypertension presenting following a mechanical fall from standing  Patient states that she was walking at home, stepped outside from a wooden deck to a walkway and fell backwards landing on her buttocks and rolling onto her left posterolateral back  Patient states that since the time of the fall she has had sharp 8/10 pain in the posterolateral area inferior to the left axilla  Patient states that the pain makes it difficult to take a deep breath  Patient additionally states pain with leaning forward returning to the side  Patient states that she has had significant muscle spasm the last day which occurs intermittently and which causes the pain to become a 10/10  Patient denies any midline back pain, pelvic pain, neck pain, headache  Patient did not strike her head  Patient takes a daily aspirin but denies additional anticoagulation  Patient denies pain anywhere else in her body  Prior to Admission Medications   Prescriptions Last Dose Informant Patient Reported? Taking?    B Complex Vitamins (VITAMIN B COMPLEX) TABS  Self Yes Yes   Sig: Take 1 tablet by mouth daily   Cholecalciferol ([employer] VITAMIN D) 2000 units CAPS  Self Yes Yes   Sig: Take by mouth daily   Coenzyme Q10 (CO Q 10 PO)  Self Yes Yes   Sig: Take by mouth   Probiotic Product (ALIGN PO)  Self Yes Yes   Sig: Take by mouth   amLODIPine (NORVASC) 5 mg tablet   Yes Yes   Sig: Take 5 mg by mouth daily   aspirin 81 MG tablet   Yes Yes   Sig: Take 162 mg by mouth   escitalopram (LEXAPRO) 10 mg tablet   Yes Yes   Sig: Take 10 mg by mouth daily at bedtime   losartan (COZAAR) 50 mg tablet  Self Yes Yes   Sig: Take 75 mg by mouth Facility-Administered Medications: None       Past Medical History:   Diagnosis Date    Abnormal Pap smear of cervix     Hyperlipidemia     Hypertension     Incisional hernia        Past Surgical History:   Procedure Laterality Date    COLOSTOMY  2011    partial, end, distal segment closure    ENTEROSTOMY CLOSURE      with anastomosis    HERNIA REPAIR  2012/2013/2014    HIP SURGERY Right 2010    HIP SURGERY Left 2011    INCISIONAL HERNIA REPAIR      Incarcerated    TOTAL HIP ARTHROPLASTY Bilateral     2011, 2012       Family History   Problem Relation Age of Onset    Heart disease Mother     Coronary artery disease Mother     Stroke Mother     Hypertension Mother     Heart failure Father     Coronary artery disease Father      I have reviewed and agree with the history as documented  Social History     Tobacco Use    Smoking status: Former Smoker    Smokeless tobacco: Never Used   Substance Use Topics    Alcohol use: Yes     Comment: weekly    Drug use: Not on file        Review of Systems   Constitutional: Negative for chills, fatigue and fever  HENT: Negative for hearing loss  Eyes: Negative for visual disturbance  Respiratory: Negative for cough, chest tightness and shortness of breath  Cardiovascular: Negative for chest pain  Gastrointestinal: Negative for abdominal distention, abdominal pain, constipation, diarrhea, nausea and vomiting  Endocrine: Negative for polydipsia and polyuria  Genitourinary: Negative for dysuria and hematuria  Musculoskeletal: Positive for myalgias (Left axilla)  Negative for arthralgias, back pain, gait problem, joint swelling, neck pain and neck stiffness  Skin: Negative for color change and rash  Neurological: Negative for dizziness, light-headedness, numbness and headaches  Psychiatric/Behavioral: Negative for confusion         Physical Exam  ED Triage Vitals [07/20/19 1139]   Temperature Pulse Respirations Blood Pressure SpO2   97 6 °F (36 4 °C) 68 18 (!) 194/90 96 %      Temp Source Heart Rate Source Patient Position - Orthostatic VS BP Location FiO2 (%)   Oral Monitor Sitting Right arm --      Pain Score       6             Orthostatic Vital Signs  Vitals:    07/20/19 1139   BP: (!) 194/90   Pulse: 68   Patient Position - Orthostatic VS: Sitting       Physical Exam   Constitutional: She is oriented to person, place, and time  She appears well-developed and well-nourished  No distress  HENT:   Head: Normocephalic and atraumatic  Right Ear: External ear normal    Left Ear: External ear normal    Nose: Nose normal    Mouth/Throat: Oropharynx is clear and moist  No oropharyngeal exudate  Eyes: Pupils are equal, round, and reactive to light  Neck: Normal range of motion  Cardiovascular: Normal rate, regular rhythm and normal heart sounds  Exam reveals no gallop and no friction rub  No murmur heard  Pulmonary/Chest: Effort normal and breath sounds normal  No respiratory distress  She has no wheezes  She exhibits no tenderness  Abdominal: Soft  Bowel sounds are normal  She exhibits no distension and no mass  There is no tenderness  There is no guarding  Musculoskeletal: Normal range of motion  She exhibits tenderness  She exhibits no edema or deformity  Moderate tenderness along the posterolateral 7th and 8th ribs without palpable deformity or hematoma  No midline cervical, thoracic, lumbar tenderness, step-offs, deformities, or hematomas  No pelvic tenderness  Lymphadenopathy:     She has no cervical adenopathy  Neurological: She is alert and oriented to person, place, and time  Skin: Skin is warm and dry  Capillary refill takes less than 2 seconds  She is not diaphoretic  Psychiatric: She has a normal mood and affect  Vitals reviewed        ED Medications  Medications - No data to display    Diagnostic Studies  Results Reviewed     None                 XR ribs with pa chest min 3 views LEFT   ED Interpretation by Cassie Bragg MD (07/20 9077)   Fracture ribs 4 + 5      Final Result by Florencio 6, DO (07/20 9910)      No active cardiopulmonary disease  Age indeterminant nondisplaced fourth and fifth rib fractures  Correlate for point tenderness  No pneumothorax  Workstation performed: [id number]               Procedures  Procedures        ED Course           Identification of Seniors at Risk      Most Recent Value   (ISAR) Identification of Seniors at Risk   Before the illness or injury that brought you to the Emergency, did you need someone to help you on a regular basis? 0 Filed at: 07/20/2019 1141   In the last 24 hours, have you needed more help than usual?  1 Filed at: 07/20/2019 1141   Have you been hospitalized for one or more nights during the past 6 months? 0 Filed at: 07/20/2019 1141   In general, do you see well?  0 Filed at: 07/20/2019 1141   In general, do you have serious problems with your memory? 0 Filed at: 07/20/2019 1141   Do you take more than three different medications every day? 1 Filed at: 07/20/2019 1141   ISAR Score  2 Filed at: 07/20/2019 1141                          MDM  Number of Diagnoses or Management Options  Fall, initial encounter:   Rib fractures:   Diagnosis management comments: Patient is a 70-year-old female presenting following a mechanical fall live left-sided posterolateral axillary pain and rib tenderness  Patient is well-appearing with normal vital signs, nondistressed  Patient with significant point bony tenderness along the described area  Patient without additional sequela of trauma or additional complaints of pain  Will check rib series to evaluate for rib fracture, flail chest, pneumothorax, symptomatic  control pain and provide incentive spirometry  Nondisplaced fractures of the 4th and 5th ribs visualized on x-ray, no pneumothorax  Patient offered pain control but refused    Given muscle spasm, patient additionally given Robaxin for symptomatic management, provided with incentive spirometry, instructions to return if symptoms worsen  Amount and/or Complexity of Data Reviewed  Tests in the radiology section of CPT®: ordered and reviewed  Decide to obtain previous medical records or to obtain history from someone other than the patient: yes  Review and summarize past medical records: yes  Independent visualization of images, tracings, or specimens: yes    Risk of Complications, Morbidity, and/or Mortality  Presenting problems: low  Diagnostic procedures: minimal  Management options: minimal    Patient Progress  Patient progress: improved      Disposition  Final diagnoses:   Fall, initial encounter   Rib fractures     Time reflects when diagnosis was documented in both MDM as applicable and the Disposition within this note     Time User Action Codes Description Comment    7/20/2019  2:59 PM Adele Montoyaand Add [A99  XNOV] Fall, initial encounter     7/20/2019  2:59 PM Erna Goodell, [address] [facility] Rib fractures       ED Disposition     ED Disposition Condition Date/Time Comment    Discharge Stable Sat Jul 20, 2019  2:59 PM Jourdan De discharge to home/self care  Follow-up Information     Follow up With Specialties Details Why [facility], MD Family Medicine   [address] [place] [address] Drive [id number] [phone]            Discharge Medication List as of 7/20/2019  3:00 PM      START taking these medications    Details   methocarbamol (ROBAXIN) 500 mg tablet Take 1 tablet (500 mg total) by mouth 2 (two) times a day for 5 days, Starting Sat 7/20/2019, Until Thu 7/25/2019, Print         CONTINUE these medications which have NOT CHANGED    Details   amLODIPine (NORVASC) 5 mg tablet Take 5 mg by mouth daily, Starting Thu 4/11/2019, Historical Med      aspirin 81 MG tablet Take 162 mg by mouth, Historical Med      B Complex Vitamins (VITAMIN B COMPLEX) TABS Take 1 tablet by mouth daily, Historical Med      Cholecalciferol ([employer]S VITAMIN D) 2000 units CAPS Take by mouth daily, Historical Med      Coenzyme Q10 (CO Q 10 PO) Take by mouth, Historical Med      escitalopram (LEXAPRO) 10 mg tablet Take 10 mg by mouth daily at bedtime, Starting Wed 6/27/2018, Historical Med      losartan (COZAAR) 50 mg tablet Take 75 mg by mouth, Historical Med      Probiotic Product (ALIGN PO) Take by mouth, Historical Med           No discharge procedures on file  ED Provider  Attending physically available and evaluated Zev Patton I managed the patient along with the ED Attending      Electronically Signed by         Iftikhar Brock MD  07/20/19 5006

## 2019-08-08 ENCOUNTER — TELEPHONE (OUTPATIENT)
Dept: OBGYN CLINIC | Facility: OTHER | Age: 74
End: 2019-08-08

## 2019-09-09 RX ORDER — NEOMYCIN SULFATE, POLYMYXIN B SULFATE AND DEXAMETHASONE 3.5; 10000; 1 MG/ML; [USP'U]/ML; MG/ML
SUSPENSION/ DROPS OPHTHALMIC
Refills: 0 | COMMUNITY
Start: 2019-06-10 | End: 2020-12-11

## 2019-09-09 RX ORDER — TRAMADOL HYDROCHLORIDE 50 MG/1
TABLET ORAL
Refills: 0 | COMMUNITY
Start: 2019-07-22 | End: 2020-12-11

## 2019-09-10 ENCOUNTER — OFFICE VISIT (OUTPATIENT)
Dept: OBGYN CLINIC | Facility: HOSPITAL | Age: 74
End: 2019-09-10
Payer: MEDICARE

## 2019-09-10 VITALS
WEIGHT: 195 LBS | DIASTOLIC BLOOD PRESSURE: 81 MMHG | BODY MASS INDEX: 30.61 KG/M2 | HEIGHT: 67 IN | HEART RATE: 76 BPM | SYSTOLIC BLOOD PRESSURE: 139 MMHG

## 2019-09-10 DIAGNOSIS — M25.561 CHRONIC PAIN OF RIGHT KNEE: ICD-10-CM

## 2019-09-10 DIAGNOSIS — G89.29 CHRONIC PAIN OF RIGHT KNEE: ICD-10-CM

## 2019-09-10 DIAGNOSIS — M17.11 PRIMARY OSTEOARTHRITIS OF RIGHT KNEE: ICD-10-CM

## 2019-09-10 DIAGNOSIS — G89.29 CHRONIC PAIN OF LEFT KNEE: ICD-10-CM

## 2019-09-10 DIAGNOSIS — M25.562 CHRONIC PAIN OF LEFT KNEE: ICD-10-CM

## 2019-09-10 DIAGNOSIS — M17.12 PRIMARY OSTEOARTHRITIS OF LEFT KNEE: Primary | ICD-10-CM

## 2019-09-10 PROCEDURE — 99213 OFFICE O/P EST LOW 20 MIN: CPT | Performed by: ORTHOPAEDIC SURGERY

## 2019-09-10 PROCEDURE — 20610 DRAIN/INJ JOINT/BURSA W/O US: CPT | Performed by: ORTHOPAEDIC SURGERY

## 2019-09-10 RX ORDER — LIDOCAINE HYDROCHLORIDE 10 MG/ML
3 INJECTION, SOLUTION INFILTRATION; PERINEURAL
Status: COMPLETED | OUTPATIENT
Start: 2019-09-10 | End: 2019-09-10

## 2019-09-10 RX ADMIN — LIDOCAINE HYDROCHLORIDE 3 ML: 10 INJECTION, SOLUTION INFILTRATION; PERINEURAL at 13:36

## 2019-09-10 RX ADMIN — LIDOCAINE HYDROCHLORIDE 3 ML: 10 INJECTION, SOLUTION INFILTRATION; PERINEURAL at 13:35

## 2019-09-10 NOTE — PROGRESS NOTES
Assessment:  1  Primary osteoarthritis of left knee  Large joint arthrocentesis: L knee   2  Chronic pain of left knee  Large joint arthrocentesis: L knee   3  Primary osteoarthritis of right knee  Large joint arthrocentesis: R knee   4  Chronic pain of right knee  Large joint arthrocentesis: R knee       Plan:  Bilateral knees known osteoarthritis  Initiation of Orthovisc 3 series performed today  Patient's knees were injected with the 1st of 3 series patient tolerated procedure well (RP)  Ice and post injection protocol advised  Weightbearing activities as tolerated  She will follow up each of the next 2 weeks to complete the series  To do next visit:  Return in about 1 week (around 9/17/2019) for re-check and Orthovisc #2 both knees  The above stated was discussed in layman's terms and the patient expressed understanding  All questions were answered to the patient's satisfaction  Scribe Attestation    I,:   Noris Gamino am acting as a scribe while in the presence of the attending physician :        I,:   Daquan Simms MD personally performed the services described in this documentation    as scribed in my presence :              Subjective:   Sissy Hu is a 68 y o  female who presents for initiation of the Orthovisc 3 series to both knees for ongoing treatment of her known osteoarthritis  Her last set of Visco was greater than 6 months ago with relief  She presents today with diminished pain compared to previous visits  However she does have discomfort medially with weight-bearing activities  She has stiffness when getting up after sedentary positions  Denies any mechanical symptoms        Review of systems negative unless otherwise specified in HPI    Past Medical History:   Diagnosis Date    Abnormal Pap smear of cervix     Hyperlipidemia     Hypertension     Incisional hernia        Past Surgical History:   Procedure Laterality Date    COLOSTOMY  2011    partial, end, distal segment closure    ENTEROSTOMY CLOSURE      with anastomosis    HERNIA REPAIR  2012/2013/2014    HIP SURGERY Right 2010    HIP SURGERY Left 2011    INCISIONAL HERNIA REPAIR      Incarcerated    TOTAL HIP ARTHROPLASTY Bilateral     2011, 2012       Family History   Problem Relation Age of Onset    Heart disease Mother     Coronary artery disease Mother     Stroke Mother     Hypertension Mother     Heart failure Father     Coronary artery disease Father        Social History     Occupational History    Not on file   Tobacco Use    Smoking status: Former Smoker    Smokeless tobacco: Never Used   Substance and Sexual Activity    Alcohol use: Yes     Comment: weekly    Drug use: Not on file    Sexual activity: Never         Current Outpatient Medications:     amLODIPine (NORVASC) 5 mg tablet, Take 5 mg by mouth daily, Disp: , Rfl: 3    aspirin 81 MG tablet, Take 162 mg by mouth, Disp: , Rfl:     B Complex Vitamins (VITAMIN B COMPLEX) TABS, Take 1 tablet by mouth daily, Disp: , Rfl:     Cholecalciferol (CVS VITAMIN D) 2000 units CAPS, Take by mouth daily, Disp: , Rfl:     Coenzyme Q10 (CO Q 10 PO), Take by mouth, Disp: , Rfl:     escitalopram (LEXAPRO) 10 mg tablet, Take 10 mg by mouth daily at bedtime, Disp: , Rfl: 6    losartan (COZAAR) 50 mg tablet, Take 75 mg by mouth, Disp: , Rfl:     methocarbamol (ROBAXIN) 500 mg tablet, Take 1 tablet (500 mg total) by mouth 2 (two) times a day for 5 days, Disp: 20 tablet, Rfl: 0    neomycin-polymyxin-dexamethasone (MAXITROL) ophthalmic suspension, INSTILL 1 DROP INTO EYE(S) FOUR TIMES DAILY FOR 1 WEEK, Disp: , Rfl: 0    Probiotic Product (ALIGN PO), Take by mouth, Disp: , Rfl:     traMADol (ULTRAM) 50 mg tablet, EVERY 4 TO 6 HOURS AS NEEDED FOR PAIN, Disp: , Rfl: 0    Allergies   Allergen Reactions    Iodides     Iodine     Other             Vitals:    09/10/19 1326   BP: 139/81   Pulse: 76       Objective:                    Right Knee Exam Muscle Strength   The patient has normal right knee strength  Tenderness   The patient is experiencing tenderness in the medial joint line  Range of Motion   The patient has normal right knee ROM  Right knee flexion: trace crepitus  Other   Erythema: absent  Sensation: normal  Swelling: none  Effusion: no effusion present      Left Knee Exam     Muscle Strength   The patient has normal left knee strength  Tenderness   The patient is experiencing tenderness in the medial joint line  Range of Motion   The patient has normal left knee ROM  Left knee flexion: trace crepitus       Other   Erythema: absent  Sensation: normal  Swelling: none  Effusion: no effusion present            Diagnostics, reviewed and taken today if performed as documented:    None performed        Procedures, if performed today:    Large joint arthrocentesis: L knee  Date/Time: 9/10/2019 1:35 PM  Consent given by: patient  Site marked: site marked  Timeout: Immediately prior to procedure a time out was called to verify the correct patient, procedure, equipment, support staff and site/side marked as required   Supporting Documentation  Indications: pain and diagnostic evaluation   Procedure Details  Location: knee - L knee  Preparation: Patient was prepped and draped in the usual sterile fashion  Needle size: 22 G  Ultrasound guidance: no  Approach: anterolateral  Medications administered: 3 mL lidocaine 1 %; 30 mg sodium hyaluronate 30 mg/2 mL    Patient tolerance: patient tolerated the procedure well with no immediate complications  Dressing:  Sterile dressing applied    Large joint arthrocentesis: R knee  Date/Time: 9/10/2019 1:36 PM  Consent given by: patient  Site marked: site marked  Timeout: Immediately prior to procedure a time out was called to verify the correct patient, procedure, equipment, support staff and site/side marked as required   Supporting Documentation  Indications: pain and diagnostic evaluation Procedure Details  Location: knee - R knee  Preparation: Patient was prepped and draped in the usual sterile fashion  Needle size: 22 G  Ultrasound guidance: no  Approach: anterolateral  Medications administered: 3 mL lidocaine 1 %; 30 mg sodium hyaluronate 30 mg/2 mL    Patient tolerance: patient tolerated the procedure well with no immediate complications  Dressing:  Sterile dressing applied            Portions of the record may have been created with voice recognition software  Occasional wrong word or "sound a like" substitutions may have occurred due to the inherent limitations of voice recognition software  Read the chart carefully and recognize, using context, where substitutions have occurred

## 2019-09-17 ENCOUNTER — OFFICE VISIT (OUTPATIENT)
Dept: OBGYN CLINIC | Facility: HOSPITAL | Age: 74
End: 2019-09-17
Payer: MEDICARE

## 2019-09-17 VITALS
HEART RATE: 69 BPM | WEIGHT: 195 LBS | BODY MASS INDEX: 30.61 KG/M2 | SYSTOLIC BLOOD PRESSURE: 138 MMHG | DIASTOLIC BLOOD PRESSURE: 82 MMHG | HEIGHT: 67 IN

## 2019-09-17 DIAGNOSIS — G89.29 CHRONIC PAIN OF LEFT KNEE: ICD-10-CM

## 2019-09-17 DIAGNOSIS — M25.561 CHRONIC PAIN OF RIGHT KNEE: ICD-10-CM

## 2019-09-17 DIAGNOSIS — M17.11 PRIMARY OSTEOARTHRITIS OF RIGHT KNEE: ICD-10-CM

## 2019-09-17 DIAGNOSIS — M17.12 PRIMARY OSTEOARTHRITIS OF LEFT KNEE: Primary | ICD-10-CM

## 2019-09-17 DIAGNOSIS — G89.29 CHRONIC PAIN OF RIGHT KNEE: ICD-10-CM

## 2019-09-17 DIAGNOSIS — M25.562 CHRONIC PAIN OF LEFT KNEE: ICD-10-CM

## 2019-09-17 PROCEDURE — 20610 DRAIN/INJ JOINT/BURSA W/O US: CPT | Performed by: ORTHOPAEDIC SURGERY

## 2019-09-17 NOTE — PROGRESS NOTES
Assessment:   Diagnosis ICD-10-CM Associated Orders   1  Primary osteoarthritis of left knee M17 12 Large joint arthrocentesis: L knee   2  Chronic pain of left knee M25 562 Large joint arthrocentesis: L knee    G89 29    3  Primary osteoarthritis of right knee M17 11 Large joint arthrocentesis: R knee   4  Chronic pain of right knee M25 561 Large joint arthrocentesis: R knee    G89 29        Plan:  Patient returns today for continuation of the Orthovisc series to both knees for ongoing treatment of her known osteoarthritis  Patient's both knees were injected with the 2nd of 3 injections today and tolerated procedures well  Ice and post injection protocol advised  Weightbearing activities as tolerated  Patient will follow up next week to conclude the Visco series  To do next visit:  Return in about 1 week (around 9/24/2019) for re-check for Orthovisc #3 both knees  The above stated was discussed in layman's terms and the patient expressed understanding  All questions were answered to the patient's satisfaction  Scribe Attestation    I,:   Norsi Gamino am acting as a scribe while in the presence of the attending physician :        I,:   Daquan Simms MD personally performed the services described in this documentation    as scribed in my presence :              Subjective:   Sissy Hu is a 68 y o  female who presents repeat evaluation of her bilateral knees and ongoing treatment of her known osteoarthritis in the form of Orthovisc  Last week her knees were injected with the 1st injection and tolerated rather well  She returns today for ongoing and continuation of the gel series        Review of systems negative unless otherwise specified in HPI    Past Medical History:   Diagnosis Date    Abnormal Pap smear of cervix     Hyperlipidemia     Hypertension     Incisional hernia        Past Surgical History:   Procedure Laterality Date    COLOSTOMY  2011    partial, end, distal segment closure    ENTEROSTOMY CLOSURE      with anastomosis    HERNIA REPAIR  2012/2013/2014    HIP SURGERY Right 2010    HIP SURGERY Left 2011    INCISIONAL HERNIA REPAIR      Incarcerated    TOTAL HIP ARTHROPLASTY Bilateral     2011, 2012       Family History   Problem Relation Age of Onset    Heart disease Mother     Coronary artery disease Mother     Stroke Mother     Hypertension Mother     Heart failure Father     Coronary artery disease Father        Social History     Occupational History    Not on file   Tobacco Use    Smoking status: Former Smoker    Smokeless tobacco: Never Used   Substance and Sexual Activity    Alcohol use: Yes     Comment: weekly    Drug use: Not on file    Sexual activity: Never         Current Outpatient Medications:     amLODIPine (NORVASC) 5 mg tablet, Take 5 mg by mouth daily, Disp: , Rfl: 3    aspirin 81 MG tablet, Take 162 mg by mouth, Disp: , Rfl:     B Complex Vitamins (VITAMIN B COMPLEX) TABS, Take 1 tablet by mouth daily, Disp: , Rfl:     Cholecalciferol (CVS VITAMIN D) 2000 units CAPS, Take by mouth daily, Disp: , Rfl:     Coenzyme Q10 (CO Q 10 PO), Take by mouth, Disp: , Rfl:     escitalopram (LEXAPRO) 10 mg tablet, Take 10 mg by mouth daily at bedtime, Disp: , Rfl: 6    losartan (COZAAR) 50 mg tablet, Take 75 mg by mouth, Disp: , Rfl:     methocarbamol (ROBAXIN) 500 mg tablet, Take 1 tablet (500 mg total) by mouth 2 (two) times a day for 5 days, Disp: 20 tablet, Rfl: 0    neomycin-polymyxin-dexamethasone (MAXITROL) ophthalmic suspension, INSTILL 1 DROP INTO EYE(S) FOUR TIMES DAILY FOR 1 WEEK, Disp: , Rfl: 0    Probiotic Product (ALIGN PO), Take by mouth, Disp: , Rfl:     traMADol (ULTRAM) 50 mg tablet, EVERY 4 TO 6 HOURS AS NEEDED FOR PAIN, Disp: , Rfl: 0    Allergies   Allergen Reactions    Iodides     Iodine     Other             Vitals:    09/17/19 1333   BP: 138/82   Pulse: 69       Objective:                    Right Knee Exam     Muscle Strength   The patient has normal right knee strength  Tenderness   The patient is experiencing tenderness in the medial joint line  Range of Motion   The patient has normal right knee ROM  Right knee flexion: trace crepitus  Other   Erythema: absent  Sensation: normal  Swelling: none  Effusion: no effusion present      Left Knee Exam     Muscle Strength   The patient has normal left knee strength  Tenderness   The patient is experiencing tenderness in the medial joint line  Range of Motion   The patient has normal left knee ROM  Left knee flexion: trace crepitus       Other   Erythema: absent  Sensation: normal  Swelling: none  Effusion: no effusion present            Diagnostics, reviewed and taken today if performed as documented:    None performed            Procedures, if performed today:    Large joint arthrocentesis: R knee  Date/Time: 9/17/2019 1:50 PM  Consent given by: patient  Site marked: site marked  Timeout: Immediately prior to procedure a time out was called to verify the correct patient, procedure, equipment, support staff and site/side marked as required   Supporting Documentation  Indications: pain and diagnostic evaluation   Procedure Details  Location: knee - R knee  Preparation: Patient was prepped and draped in the usual sterile fashion  Needle size: 22 G  Ultrasound guidance: no  Approach: anteromedial  Medications administered: 30 mg sodium hyaluronate 30 mg/2 mL    Patient tolerance: patient tolerated the procedure well with no immediate complications  Dressing:  Sterile dressing applied    Large joint arthrocentesis: L knee  Date/Time: 9/17/2019 1:51 PM  Consent given by: patient  Site marked: site marked  Timeout: Immediately prior to procedure a time out was called to verify the correct patient, procedure, equipment, support staff and site/side marked as required   Supporting Documentation  Indications: pain and diagnostic evaluation   Procedure Details  Location: knee - L knee  Preparation: Patient was prepped and draped in the usual sterile fashion  Needle size: 22 G  Ultrasound guidance: no  Approach: anteromedial  Medications administered: 30 mg sodium hyaluronate 30 mg/2 mL    Patient tolerance: patient tolerated the procedure well with no immediate complications  Dressing:  Sterile dressing applied               Portions of the record may have been created with voice recognition software  Occasional wrong word or "sound a like" substitutions may have occurred due to the inherent limitations of voice recognition software  Read the chart carefully and recognize, using context, where substitutions have occurred

## 2019-09-24 ENCOUNTER — OFFICE VISIT (OUTPATIENT)
Dept: OBGYN CLINIC | Facility: HOSPITAL | Age: 74
End: 2019-09-24
Payer: MEDICARE

## 2019-09-24 VITALS
DIASTOLIC BLOOD PRESSURE: 89 MMHG | HEART RATE: 61 BPM | WEIGHT: 195 LBS | BODY MASS INDEX: 30.61 KG/M2 | SYSTOLIC BLOOD PRESSURE: 153 MMHG | HEIGHT: 67 IN

## 2019-09-24 DIAGNOSIS — M17.12 PRIMARY OSTEOARTHRITIS OF LEFT KNEE: Primary | ICD-10-CM

## 2019-09-24 DIAGNOSIS — M17.11 PRIMARY OSTEOARTHRITIS OF RIGHT KNEE: ICD-10-CM

## 2019-09-24 PROCEDURE — 20610 DRAIN/INJ JOINT/BURSA W/O US: CPT | Performed by: ORTHOPAEDIC SURGERY

## 2019-09-24 NOTE — PROGRESS NOTES
Assessment:  1  Primary osteoarthritis of left knee     2  Primary osteoarthritis of right knee         Plan:  The patient is provided with 3rd Orthovisc injections  She should follow up in 3 months  To do next visit:  Return in about 3 months (around 12/24/2019)  The above stated was discussed in layman's terms and the patient expressed understanding  All questions were answered to the patient's satisfaction  Scribe Attestation    I,:   Lynden Elders am acting as a scribe while in the presence of the attending physician :        I,:   Cecy De León MD personally performed the services described in this documentation    as scribed in my presence :              Subjective:   Becky Lim is a 68 y o  female who presents for 3rd Orthovisc bilateral knee injections  Today she complains of generalized bilateral knee pain          Review of systems negative unless otherwise specified in HPI    Past Medical History:   Diagnosis Date    Abnormal Pap smear of cervix     Hyperlipidemia     Hypertension     Incisional hernia        Past Surgical History:   Procedure Laterality Date    COLOSTOMY  2011    partial, end, distal segment closure    ENTEROSTOMY CLOSURE      with anastomosis    HERNIA REPAIR  2012/2013/2014    HIP SURGERY Right 2010    HIP SURGERY Left 2011    INCISIONAL HERNIA REPAIR      Incarcerated    TOTAL HIP ARTHROPLASTY Bilateral     2011, 2012       Family History   Problem Relation Age of Onset    Heart disease Mother     Coronary artery disease Mother     Stroke Mother     Hypertension Mother     Heart failure Father     Coronary artery disease Father        Social History     Occupational History    Not on file   Tobacco Use    Smoking status: Former Smoker    Smokeless tobacco: Never Used   Substance and Sexual Activity    Alcohol use: Yes     Comment: weekly    Drug use: Not on file    Sexual activity: Never         Current Outpatient Medications:    amLODIPine (NORVASC) 5 mg tablet, Take 5 mg by mouth daily, Disp: , Rfl: 3    aspirin 81 MG tablet, Take 162 mg by mouth, Disp: , Rfl:     B Complex Vitamins (VITAMIN B COMPLEX) TABS, Take 1 tablet by mouth daily, Disp: , Rfl:     Cholecalciferol (CVS VITAMIN D) 2000 units CAPS, Take by mouth daily, Disp: , Rfl:     Coenzyme Q10 (CO Q 10 PO), Take by mouth, Disp: , Rfl:     escitalopram (LEXAPRO) 10 mg tablet, Take 10 mg by mouth daily at bedtime, Disp: , Rfl: 6    losartan (COZAAR) 50 mg tablet, Take 75 mg by mouth, Disp: , Rfl:     methocarbamol (ROBAXIN) 500 mg tablet, Take 1 tablet (500 mg total) by mouth 2 (two) times a day for 5 days, Disp: 20 tablet, Rfl: 0    neomycin-polymyxin-dexamethasone (MAXITROL) ophthalmic suspension, INSTILL 1 DROP INTO EYE(S) FOUR TIMES DAILY FOR 1 WEEK, Disp: , Rfl: 0    Probiotic Product (ALIGN PO), Take by mouth, Disp: , Rfl:     traMADol (ULTRAM) 50 mg tablet, EVERY 4 TO 6 HOURS AS NEEDED FOR PAIN, Disp: , Rfl: 0    Allergies   Allergen Reactions    Iodides     Iodine     Other             Vitals:    09/24/19 1333   BP: 153/89   Pulse: 61       Objective:  Physical exam  · General: Awake, Alert, Oriented  · Eyes: Pupils equal, round and reactive to light  · Heart: regular rate and rhythm  · Lungs: No audible wheezing  · Abdomen: soft                    Ortho Exam   Bilateral knees:  No erythema or ecchymosis  No effusion or swelling  Normal strength  Good ROM with crepitus  Calf compartments soft and supple  Sensation intact  Toes are warm sensate and mobile        Diagnostics, reviewed and taken today if performed as documented:    None performed    Procedures, if performed today:    Large joint arthrocentesis: R knee  Date/Time: 9/24/2019 1:42 PM  Consent given by: patient  Site marked: site marked  Timeout: Immediately prior to procedure a time out was called to verify the correct patient, procedure, equipment, support staff and site/side marked as required Supporting Documentation  Indications: pain   Procedure Details  Location: knee - R knee  Preparation: Patient was prepped and draped in the usual sterile fashion  Needle size: 22 G  Ultrasound guidance: no  Approach: anterolateral  Medications administered: 30 mg sodium hyaluronate 30 mg/2 mL  Specialty Pharmacy Supplied: received medications from pharmacy  Patient tolerance: patient tolerated the procedure well with no immediate complications  Dressing:  Sterile dressing applied    Large joint arthrocentesis: L knee  Date/Time: 9/24/2019 1:42 PM  Consent given by: patient  Site marked: site marked  Timeout: Immediately prior to procedure a time out was called to verify the correct patient, procedure, equipment, support staff and site/side marked as required   Supporting Documentation  Indications: pain   Procedure Details  Location: knee - L knee  Preparation: Patient was prepped and draped in the usual sterile fashion  Needle size: 22 G  Ultrasound guidance: no  Approach: anterolateral  Medications administered: 30 mg sodium hyaluronate 30 mg/2 mL  Specialty Pharmacy Supplied: received medications from pharmacy  Patient tolerance: patient tolerated the procedure well with no immediate complications  Dressing:  Sterile dressing applied             Portions of the record may have been created with voice recognition software  Occasional wrong word or "sound a like" substitutions may have occurred due to the inherent limitations of voice recognition software  Read the chart carefully and recognize, using context, where substitutions have occurred

## 2020-01-29 ENCOUNTER — TRANSCRIBE ORDERS (OUTPATIENT)
Dept: ADMINISTRATIVE | Facility: HOSPITAL | Age: 75
End: 2020-01-29

## 2020-01-29 DIAGNOSIS — Z12.31 VISIT FOR SCREENING MAMMOGRAM: Primary | ICD-10-CM

## 2020-03-03 ENCOUNTER — HOSPITAL ENCOUNTER (OUTPATIENT)
Dept: RADIOLOGY | Facility: HOSPITAL | Age: 75
Discharge: HOME | End: 2020-03-03
Payer: MEDICARE

## 2020-03-03 VITALS — WEIGHT: 191 LBS | BODY MASS INDEX: 29.98 KG/M2 | HEIGHT: 67 IN

## 2020-03-03 DIAGNOSIS — Z12.31 VISIT FOR SCREENING MAMMOGRAM: ICD-10-CM

## 2020-03-03 PROCEDURE — 77067 SCR MAMMO BI INCL CAD: CPT

## 2020-03-11 DIAGNOSIS — M25.551 PAIN OF BOTH HIP JOINTS: Primary | ICD-10-CM

## 2020-03-11 DIAGNOSIS — M25.552 PAIN OF BOTH HIP JOINTS: Primary | ICD-10-CM

## 2020-08-19 ENCOUNTER — TELEPHONE (OUTPATIENT)
Dept: OBGYN CLINIC | Facility: HOSPITAL | Age: 75
End: 2020-08-19

## 2020-08-19 DIAGNOSIS — M17.0 PRIMARY OSTEOARTHRITIS OF BOTH KNEES: Primary | ICD-10-CM

## 2020-08-19 NOTE — TELEPHONE ENCOUNTER
Dr Haas Case  Re: Gel injection  #     Patient asked for dr Kelsy Townsend to order Gel injections bilateral knee    Patient confirmed medicare as insurance    Please reach out to patient once she can be seen    Thank You

## 2020-08-20 ENCOUNTER — TELEPHONE (OUTPATIENT)
Dept: OBGYN CLINIC | Facility: OTHER | Age: 75
End: 2020-08-20

## 2020-08-20 NOTE — TELEPHONE ENCOUNTER
This was already ordered    This is rerouted to the injection procurement team    This may be discussed directly with them

## 2020-08-20 NOTE — TELEPHONE ENCOUNTER
TO BE COMPLETED BY CENTRAL AUTH TEAM:     Physician: Hiram Jackson    Medication: Orthovisc    Number of Injections in Series (Appointments scheduled 1 week apart from one another):  >>> 3    Schedule after this date:   Upon Availability     Billing Info: Buy and Bill/Specialty Pharmacy-Patient Supply  >> Buy and [email or web address] Line:  bilat knee Orthovisc # 1,2 & 3 buy & bill     (please copy and paste appointment message line when scheduling appointment)    Additional Comments:    Left msg to scheduled visco

## 2020-08-21 NOTE — TELEPHONE ENCOUNTER
TO BE COMPLETED BY CENTRAL AUTH TEAM:     Physician: Juan Oliveira    Medication: [address]    Number of Injections in Series (Appointments scheduled 1 week apart from one another): 3    Schedule after this date: UPON AVAILABILITY    Billing Info: Buy and Bill/Specialty Pharmacy-Patient Supply<<<< BUY AND BILL    Appointment Message Line: [facility] [address] # 1, 2, 3/ BUY AND BILL

## 2020-09-15 ENCOUNTER — OFFICE VISIT (OUTPATIENT)
Dept: OBGYN CLINIC | Facility: HOSPITAL | Age: 75
End: 2020-09-15
Payer: MEDICARE

## 2020-09-15 VITALS
BODY MASS INDEX: 31.36 KG/M2 | WEIGHT: 199.8 LBS | HEIGHT: 67 IN | HEART RATE: 66 BPM | SYSTOLIC BLOOD PRESSURE: 148 MMHG | DIASTOLIC BLOOD PRESSURE: 84 MMHG

## 2020-09-15 DIAGNOSIS — M17.0 PRIMARY OSTEOARTHRITIS OF BOTH KNEES: Primary | ICD-10-CM

## 2020-09-15 PROCEDURE — 20610 DRAIN/INJ JOINT/BURSA W/O US: CPT | Performed by: ORTHOPAEDIC SURGERY

## 2020-09-15 NOTE — PROGRESS NOTES
Assessment:  1  Primary osteoarthritis of both knees  Large joint arthrocentesis: R knee    Large joint arthrocentesis: L knee       Plan:  The patient was provided with 1st bilateral knee Orthovisc injections  She tolerated the procedure well  She should follow up in one week for 2nd bilateral Orthovisc injections  To do next visit:  Return in about 1 week (around 9/22/2020)  The above stated was discussed in layman's terms and the patient expressed understanding  All questions were answered to the patient's satisfaction  Scribe Attestation    I,:   Yazmin James am acting as a scribe while in the presence of the attending physician :        I,:   Cassie Moreno MD personally performed the services described in this documentation    as scribed in my presence :              Subjective:   Amor Sanders is a 76 y o  female who presents for bilateral Orthovisc injection  She is s/p bilateral knee Orthovisc injections 9/24/2019 with benefit  Today she complains of right > left generalized knee pain  Descending stairs aggravates while rest alleviates  She denies medications for this issue  She denies past knee surgery yet has had bilateral REGGIE          Review of systems negative unless otherwise specified in HPI    Past Medical History:   Diagnosis Date    Abnormal Pap smear of cervix     Hyperlipidemia     Hypertension     Incisional hernia        Past Surgical History:   Procedure Laterality Date    COLOSTOMY  2011    partial, end, distal segment closure    ENTEROSTOMY CLOSURE      with anastomosis    HERNIA REPAIR  2012/2013/2014    HIP SURGERY Right 2010    HIP SURGERY Left 2011    INCISIONAL HERNIA REPAIR      Incarcerated    TOTAL HIP ARTHROPLASTY Bilateral     2011, 2012       Family History   Problem Relation Age of Onset    Heart disease Mother     Coronary artery disease Mother     Stroke Mother     Hypertension Mother     Heart failure Father     Coronary artery disease Father     Colon cancer Maternal Uncle 61    No Known Problems Maternal Grandmother     No Known Problems Maternal Grandfather     No Known Problems Paternal Grandmother     No Known Problems Paternal Grandfather     No Known Problems Son     No Known Problems Son        Social History     Occupational History    Not on file   Tobacco Use    Smoking status: Former Smoker    Smokeless tobacco: Never Used   Substance and Sexual Activity    Alcohol use: Yes     Comment: weekly    Drug use: Not on file    Sexual activity: Never         Current Outpatient Medications:     amLODIPine (NORVASC) 5 mg tablet, Take 5 mg by mouth daily, Disp: , Rfl: 3    aspirin 81 MG tablet, Take 162 mg by mouth, Disp: , Rfl:     B Complex Vitamins (VITAMIN B COMPLEX) TABS, Take 1 tablet by mouth daily, Disp: , Rfl:     Cholecalciferol (CVS VITAMIN D) 2000 units CAPS, Take by mouth daily, Disp: , Rfl:     Coenzyme Q10 (CO Q 10 PO), Take by mouth, Disp: , Rfl:     escitalopram (LEXAPRO) 10 mg tablet, Take 10 mg by mouth daily at bedtime, Disp: , Rfl: 6    losartan (COZAAR) 50 mg tablet, Take 75 mg by mouth, Disp: , Rfl:     methocarbamol (ROBAXIN) 500 mg tablet, Take 1 tablet (500 mg total) by mouth 2 (two) times a day for 5 days, Disp: 20 tablet, Rfl: 0    neomycin-polymyxin-dexamethasone (MAXITROL) ophthalmic suspension, INSTILL 1 DROP INTO EYE(S) FOUR TIMES DAILY FOR 1 WEEK, Disp: , Rfl: 0    Probiotic Product (ALIGN PO), Take by mouth, Disp: , Rfl:     traMADol (ULTRAM) 50 mg tablet, EVERY 4 TO 6 HOURS AS NEEDED FOR PAIN, Disp: , Rfl: 0    Allergies   Allergen Reactions    Iodides     Iodine     Other             Vitals:    09/15/20 1439   BP: 148/84   Pulse: 66       Objective:  Physical exam  · General: Awake, Alert, Oriented  · Eyes: Pupils equal, round and reactive to light  · Heart: regular rate and rhythm  · Lungs: No audible wheezing  · Abdomen: soft                    Ortho Exam   Bilateral knees:  No erythema or ecchymosis  No effusion or swelling  Normal strength  Good ROM with crepitus   Calf compartments soft and supple  Sensation intact  Toes are warm sensate and mobile        Diagnostics, reviewed and taken today if performed as documented:    None performed    Procedures, if performed today:    Large joint arthrocentesis: R knee  Date/Time: 9/15/2020 3:01 PM  Consent given by: patient  Site marked: site marked  Timeout: Immediately prior to procedure a time out was called to verify the correct patient, procedure, equipment, support staff and site/side marked as required   Supporting Documentation  Indications: pain   Procedure Details  Location: knee - R knee  Preparation: Patient was prepped and draped in the usual sterile fashion  Needle size: 22 G  Ultrasound guidance: no  Approach: anterolateral  Medications administered: 30 mg sodium hyaluronate 30 mg/2 mL    Patient tolerance: patient tolerated the procedure well with no immediate complications  Dressing:  Sterile dressing applied    Large joint arthrocentesis: L knee  Date/Time: 9/15/2020 3:02 PM  Consent given by: patient  Site marked: site marked  Timeout: Immediately prior to procedure a time out was called to verify the correct patient, procedure, equipment, support staff and site/side marked as required   Supporting Documentation  Indications: pain   Procedure Details  Location: knee - L knee  Preparation: Patient was prepped and draped in the usual sterile fashion  Needle size: 22 G  Ultrasound guidance: no  Approach: anterolateral  Medications administered: 30 mg sodium hyaluronate 30 mg/2 mL    Patient tolerance: patient tolerated the procedure well with no immediate complications  Dressing:  Sterile dressing applied             Portions of the record may have been created with voice recognition software    Occasional wrong word or "sound a like" substitutions may have occurred due to the inherent limitations of voice recognition software  Read the chart carefully and recognize, using context, where substitutions have occurred

## 2020-09-22 ENCOUNTER — OFFICE VISIT (OUTPATIENT)
Dept: OBGYN CLINIC | Facility: HOSPITAL | Age: 75
End: 2020-09-22
Payer: MEDICARE

## 2020-09-22 VITALS
HEIGHT: 67 IN | BODY MASS INDEX: 31.29 KG/M2 | SYSTOLIC BLOOD PRESSURE: 142 MMHG | HEART RATE: 71 BPM | DIASTOLIC BLOOD PRESSURE: 83 MMHG

## 2020-09-22 DIAGNOSIS — M17.0 PRIMARY OSTEOARTHRITIS OF BOTH KNEES: Primary | ICD-10-CM

## 2020-09-22 DIAGNOSIS — M25.561 CHRONIC PAIN OF RIGHT KNEE: ICD-10-CM

## 2020-09-22 DIAGNOSIS — G89.29 CHRONIC PAIN OF LEFT KNEE: ICD-10-CM

## 2020-09-22 DIAGNOSIS — G89.29 CHRONIC PAIN OF RIGHT KNEE: ICD-10-CM

## 2020-09-22 DIAGNOSIS — M25.562 CHRONIC PAIN OF LEFT KNEE: ICD-10-CM

## 2020-09-22 PROCEDURE — 20610 DRAIN/INJ JOINT/BURSA W/O US: CPT | Performed by: ORTHOPAEDIC SURGERY

## 2020-09-22 NOTE — PROGRESS NOTES
Assessment:  1  Primary osteoarthritis of both knees     2  Chronic pain of right knee     3  Chronic pain of left knee         Plan:  The patient was provided with 2nd bilateral knee Orthovisc injections  She tolerated the procedure well  She should follow up in one week for 3rd bilateral Orthovisc injections  To do next visit:  Return in about 1 week (around 9/29/2020)  The above stated was discussed in layman's terms and the patient expressed understanding  All questions were answered to the patient's satisfaction  Scribe Attestation    I,:   Andrew Hadley am acting as a scribe while in the presence of the attending physician :        I,:   Zuri Macdonald MD personally performed the services described in this documentation    as scribed in my presence :              Subjective:   Karlee Joseph is a 76 y o  female who presents for 2nd bilateral Orthovisc injection  Today she complains of right > left generalized knee pain  Descending stairs aggravates while rest alleviates  She denies medications for this issue  She denies past knee surgery yet has had bilateral REGGIE          Review of systems negative unless otherwise specified in HPI    Past Medical History:   Diagnosis Date    Abnormal Pap smear of cervix     Hyperlipidemia     Hypertension     Incisional hernia        Past Surgical History:   Procedure Laterality Date    COLOSTOMY  2011    partial, end, distal segment closure    ENTEROSTOMY CLOSURE      with anastomosis    HERNIA REPAIR  2012/2013/2014    HIP SURGERY Right 2010    HIP SURGERY Left 2011    INCISIONAL HERNIA REPAIR      Incarcerated    TOTAL HIP ARTHROPLASTY Bilateral     2011, 2012       Family History   Problem Relation Age of Onset    Heart disease Mother     Coronary artery disease Mother     Stroke Mother     Hypertension Mother     Heart failure Father     Coronary artery disease Father     Colon cancer Maternal Uncle 61    No Known Problems Maternal Grandmother     No Known Problems Maternal Grandfather     No Known Problems Paternal Grandmother     No Known Problems Paternal Grandfather     No Known Problems Son     No Known Problems Son        Social History     Occupational History    Not on file   Tobacco Use    Smoking status: Former Smoker    Smokeless tobacco: Never Used   Substance and Sexual Activity    Alcohol use: Yes     Comment: weekly    Drug use: Not on file    Sexual activity: Never         Current Outpatient Medications:     amLODIPine (NORVASC) 5 mg tablet, Take 5 mg by mouth daily, Disp: , Rfl: 3    aspirin 81 MG tablet, Take 162 mg by mouth, Disp: , Rfl:     B Complex Vitamins (VITAMIN B COMPLEX) TABS, Take 1 tablet by mouth daily, Disp: , Rfl:     Cholecalciferol (CVS VITAMIN D) 2000 units CAPS, Take by mouth daily, Disp: , Rfl:     Coenzyme Q10 (CO Q 10 PO), Take by mouth, Disp: , Rfl:     escitalopram (LEXAPRO) 10 mg tablet, Take 10 mg by mouth daily at bedtime, Disp: , Rfl: 6    losartan (COZAAR) 50 mg tablet, Take 75 mg by mouth, Disp: , Rfl:     methocarbamol (ROBAXIN) 500 mg tablet, Take 1 tablet (500 mg total) by mouth 2 (two) times a day for 5 days, Disp: 20 tablet, Rfl: 0    neomycin-polymyxin-dexamethasone (MAXITROL) ophthalmic suspension, INSTILL 1 DROP INTO EYE(S) FOUR TIMES DAILY FOR 1 WEEK, Disp: , Rfl: 0    Probiotic Product (ALIGN PO), Take by mouth, Disp: , Rfl:     traMADol (ULTRAM) 50 mg tablet, EVERY 4 TO 6 HOURS AS NEEDED FOR PAIN, Disp: , Rfl: 0    Allergies   Allergen Reactions    Iodides     Iodine     Other             Vitals:    09/22/20 1444   BP: 142/83   Pulse: 71       Objective:  Physical exam  · General: Awake, Alert, Oriented  · Eyes: Pupils equal, round and reactive to light  · Heart: regular rate and rhythm  · Lungs: No audible wheezing  · Abdomen: soft                    Ortho Exam   Bilateral knees:  No erythema or ecchymosis  No effusion or swelling  Normal strength  Good ROM with crepitus   Calf compartments soft and supple  Sensation intact  Toes are warm sensate and mobile        Diagnostics, reviewed and taken today if performed as documented:    None performed    Procedures, if performed today:    Large joint arthrocentesis: R knee  Date/Time: 9/22/2020 3:06 PM  Consent given by: patient  Site marked: site marked  Timeout: Immediately prior to procedure a time out was called to verify the correct patient, procedure, equipment, support staff and site/side marked as required   Supporting Documentation  Indications: pain   Procedure Details  Location: knee - R knee  Preparation: Patient was prepped and draped in the usual sterile fashion  Needle size: 22 G  Ultrasound guidance: no  Approach: anteromedial  Medications administered: 30 mg sodium hyaluronate 30 mg/2 mL    Patient tolerance: patient tolerated the procedure well with no immediate complications  Dressing:  Sterile dressing applied    Large joint arthrocentesis: L knee  Date/Time: 9/22/2020 3:06 PM  Consent given by: patient  Site marked: site marked  Timeout: Immediately prior to procedure a time out was called to verify the correct patient, procedure, equipment, support staff and site/side marked as required   Supporting Documentation  Indications: pain   Procedure Details  Location: knee - L knee  Preparation: Patient was prepped and draped in the usual sterile fashion  Needle size: 22 G  Ultrasound guidance: no  Approach: anteromedial  Medications administered: 30 mg sodium hyaluronate 30 mg/2 mL    Patient tolerance: patient tolerated the procedure well with no immediate complications  Dressing:  Sterile dressing applied             Portions of the record may have been created with voice recognition software  Occasional wrong word or "sound a like" substitutions may have occurred due to the inherent limitations of voice recognition software    Read the chart carefully and recognize, using context, where substitutions have occurred

## 2020-09-29 ENCOUNTER — OFFICE VISIT (OUTPATIENT)
Dept: OBGYN CLINIC | Facility: HOSPITAL | Age: 75
End: 2020-09-29
Payer: MEDICARE

## 2020-09-29 VITALS
HEART RATE: 74 BPM | BODY MASS INDEX: 31.29 KG/M2 | HEIGHT: 67 IN | DIASTOLIC BLOOD PRESSURE: 90 MMHG | SYSTOLIC BLOOD PRESSURE: 169 MMHG

## 2020-09-29 DIAGNOSIS — M25.561 CHRONIC PAIN OF BOTH KNEES: ICD-10-CM

## 2020-09-29 DIAGNOSIS — G89.29 CHRONIC PAIN OF BOTH KNEES: ICD-10-CM

## 2020-09-29 DIAGNOSIS — M17.0 PRIMARY OSTEOARTHRITIS OF BOTH KNEES: Primary | ICD-10-CM

## 2020-09-29 DIAGNOSIS — M25.562 CHRONIC PAIN OF BOTH KNEES: ICD-10-CM

## 2020-09-29 PROCEDURE — 20610 DRAIN/INJ JOINT/BURSA W/O US: CPT | Performed by: ORTHOPAEDIC SURGERY

## 2020-09-29 NOTE — PROGRESS NOTES
Subjective;    28-year-old female with established osteoarthritis of both knees  She is receiving a 3 shot series of Orthovisc for both knees  She presents to the appointment today for the same      Past Medical History:   Diagnosis Date    Abnormal Pap smear of cervix     Hyperlipidemia     Hypertension     Incisional hernia        Past Surgical History:   Procedure Laterality Date    COLOSTOMY  2011    partial, end, distal segment closure    ENTEROSTOMY CLOSURE      with anastomosis    HERNIA REPAIR  2012/2013/2014    HIP SURGERY Right 2010    HIP SURGERY Left 2011    INCISIONAL HERNIA REPAIR      Incarcerated    TOTAL HIP ARTHROPLASTY Bilateral     2011, 2012       Family History   Problem Relation Age of Onset    Heart disease Mother     Coronary artery disease Mother    Jullie Liming Stroke Mother     Hypertension Mother     Heart failure Father     Coronary artery disease Father     Colon cancer Maternal Uncle 61    No Known Problems Maternal Grandmother     No Known Problems Maternal Grandfather     No Known Problems Paternal Grandmother     No Known Problems Paternal Grandfather     No Known Problems Son     No Known Problems Son        Social History     Tobacco Use    Smoking status: Former Smoker    Smokeless tobacco: Never Used   Substance Use Topics    Alcohol use: Yes     Comment: weekly    Drug use: Not on file     Exam;    There is no discoloration no redness of either knee  There is no fluid distension or effusion of either knee  Her knees allow safe administration of Orthovisc at today's appointment    Large joint arthrocentesis: R knee  Date/Time: 9/29/2020 2:43 PM  Procedure Details  Location: knee - R knee  Medications administered: 30 mg sodium hyaluronate 30 mg/2 mL    Patient tolerance: patient tolerated the procedure well with no immediate complications  Dressing:  Sterile dressing applied    Large joint arthrocentesis: L knee  Date/Time: 9/29/2020 2:44 PM  Procedure Details  Location: knee - L knee  Medications administered: 30 mg sodium hyaluronate 30 mg/2 mL    Patient tolerance: patient tolerated the procedure well with no immediate complications  Dressing:  Sterile dressing applied      Impression;    Bilateral knee osteoarthritis  Bilateral knee pain    Plan;    She underwent injection 3   Of the 3 shot series at today's appointment  We will see her in 3 additional months  Her experience was supervised by and plan formulated by the attending surgeon it was my privilege to provide him assistance in the delivery of her care

## 2020-11-05 ENCOUNTER — TELEPHONE (OUTPATIENT)
Dept: OBGYN CLINIC | Facility: CLINIC | Age: 75
End: 2020-11-05

## 2020-11-05 DIAGNOSIS — N95.0 PMB (POSTMENOPAUSAL BLEEDING): Primary | ICD-10-CM

## 2020-11-19 ENCOUNTER — TRANSCRIBE ORDERS (OUTPATIENT)
Dept: RADIOLOGY | Facility: HOSPITAL | Age: 75
End: 2020-11-19

## 2020-11-19 ENCOUNTER — HOSPITAL ENCOUNTER (OUTPATIENT)
Dept: RADIOLOGY | Facility: HOSPITAL | Age: 75
Discharge: HOME | End: 2020-11-19
Attending: OBSTETRICS & GYNECOLOGY
Payer: MEDICARE

## 2020-11-19 DIAGNOSIS — R42 DIZZINESS AND GIDDINESS: Primary | ICD-10-CM

## 2020-11-19 DIAGNOSIS — N95.0 PMB (POSTMENOPAUSAL BLEEDING): ICD-10-CM

## 2020-11-19 DIAGNOSIS — K21.9 GASTROESOPHAGEAL REFLUX DISEASE WITHOUT ESOPHAGITIS: ICD-10-CM

## 2020-11-19 PROCEDURE — 76856 US EXAM PELVIC COMPLETE: CPT

## 2020-11-19 PROCEDURE — 76830 TRANSVAGINAL US NON-OB: CPT

## 2020-11-23 ENCOUNTER — TELEPHONE (OUTPATIENT)
Dept: OBGYN CLINIC | Facility: CLINIC | Age: 75
End: 2020-11-23

## 2020-11-30 ENCOUNTER — PROCEDURE VISIT (OUTPATIENT)
Dept: OBGYN CLINIC | Facility: CLINIC | Age: 75
End: 2020-11-30
Payer: MEDICARE

## 2020-11-30 VITALS
BODY MASS INDEX: 31.52 KG/M2 | HEIGHT: 67 IN | SYSTOLIC BLOOD PRESSURE: 146 MMHG | WEIGHT: 200.8 LBS | DIASTOLIC BLOOD PRESSURE: 90 MMHG

## 2020-11-30 DIAGNOSIS — N95.0 POSTMENOPAUSAL BLEEDING: Primary | ICD-10-CM

## 2020-11-30 PROCEDURE — 99213 OFFICE O/P EST LOW 20 MIN: CPT | Performed by: OBSTETRICS & GYNECOLOGY

## 2020-11-30 PROCEDURE — 57500 BIOPSY OF CERVIX: CPT | Performed by: OBSTETRICS & GYNECOLOGY

## 2020-11-30 PROCEDURE — 88342 IMHCHEM/IMCYTCHM 1ST ANTB: CPT | Performed by: PATHOLOGY

## 2020-11-30 PROCEDURE — 88305 TISSUE EXAM BY PATHOLOGIST: CPT | Performed by: PATHOLOGY

## 2020-11-30 PROCEDURE — 58100 BIOPSY OF UTERUS LINING: CPT | Performed by: OBSTETRICS & GYNECOLOGY

## 2020-12-01 ENCOUNTER — PREP FOR PROCEDURE (OUTPATIENT)
Dept: OBGYN CLINIC | Facility: CLINIC | Age: 75
End: 2020-12-01

## 2020-12-01 DIAGNOSIS — Z01.818 PREOP TESTING: ICD-10-CM

## 2020-12-01 DIAGNOSIS — N95.0 POST-MENOPAUSAL BLEEDING: Primary | ICD-10-CM

## 2020-12-01 DIAGNOSIS — N93.9 ABNORMAL UTERINE BLEEDING DUE TO ENDOCERVICAL POLYP: ICD-10-CM

## 2020-12-01 DIAGNOSIS — N84.1 ABNORMAL UTERINE BLEEDING DUE TO ENDOCERVICAL POLYP: ICD-10-CM

## 2020-12-02 ENCOUNTER — HOSPITAL ENCOUNTER (OUTPATIENT)
Dept: RADIOLOGY | Facility: HOSPITAL | Age: 75
Discharge: HOME | End: 2020-12-02
Payer: MEDICARE

## 2020-12-02 ENCOUNTER — TRANSCRIBE ORDERS (OUTPATIENT)
Dept: RADIOLOGY | Facility: HOSPITAL | Age: 75
End: 2020-12-02

## 2020-12-02 ENCOUNTER — TELEPHONE (OUTPATIENT)
Dept: OBGYN CLINIC | Facility: CLINIC | Age: 75
End: 2020-12-02

## 2020-12-02 DIAGNOSIS — R06.00 DYSPNEA, UNSPECIFIED TYPE: ICD-10-CM

## 2020-12-02 DIAGNOSIS — K21.9 GASTROESOPHAGEAL REFLUX DISEASE WITHOUT ESOPHAGITIS: ICD-10-CM

## 2020-12-02 DIAGNOSIS — R06.00 DYSPNEA ON EXERTION: Primary | ICD-10-CM

## 2020-12-02 PROCEDURE — 76700 US EXAM ABDOM COMPLETE: CPT

## 2020-12-02 PROCEDURE — 71046 X-RAY EXAM CHEST 2 VIEWS: CPT

## 2020-12-04 ENCOUNTER — HOSPITAL ENCOUNTER (OUTPATIENT)
Dept: NON INVASIVE DIAGNOSTICS | Facility: CLINIC | Age: 75
Discharge: HOME | End: 2020-12-04
Payer: MEDICARE

## 2020-12-04 DIAGNOSIS — R42 DIZZINESS AND GIDDINESS: ICD-10-CM

## 2020-12-04 PROCEDURE — 93880 EXTRACRANIAL BILAT STUDY: CPT

## 2020-12-04 PROCEDURE — 93880 EXTRACRANIAL BILAT STUDY: CPT | Performed by: SURGERY

## 2020-12-07 ENCOUNTER — OFFICE VISIT (OUTPATIENT)
Dept: OBGYN CLINIC | Facility: CLINIC | Age: 75
End: 2020-12-07

## 2020-12-07 VITALS
SYSTOLIC BLOOD PRESSURE: 140 MMHG | BODY MASS INDEX: 31.23 KG/M2 | DIASTOLIC BLOOD PRESSURE: 86 MMHG | HEIGHT: 67 IN | WEIGHT: 199 LBS

## 2020-12-07 DIAGNOSIS — Z01.818 PREOP EXAMINATION: Primary | ICD-10-CM

## 2020-12-07 PROCEDURE — PREOP: Performed by: OBSTETRICS & GYNECOLOGY

## 2020-12-09 ENCOUNTER — LAB (OUTPATIENT)
Dept: LAB | Facility: HOSPITAL | Age: 75
End: 2020-12-09
Attending: OBSTETRICS & GYNECOLOGY
Payer: MEDICARE

## 2020-12-09 DIAGNOSIS — Z01.818 PREOP TESTING: ICD-10-CM

## 2020-12-09 LAB
BASOPHILS # BLD AUTO: 0.05 THOUSANDS/ΜL (ref 0–0.1)
BASOPHILS NFR BLD AUTO: 1 % (ref 0–1)
EOSINOPHIL # BLD AUTO: 0.16 THOUSAND/ΜL (ref 0–0.61)
EOSINOPHIL NFR BLD AUTO: 2 % (ref 0–6)
ERYTHROCYTE [DISTWIDTH] IN BLOOD BY AUTOMATED COUNT: 13.3 % (ref 11.6–15.1)
HCT VFR BLD AUTO: 40.7 % (ref 34.8–46.1)
HGB BLD-MCNC: 13.4 G/DL (ref 11.5–15.4)
IMM GRANULOCYTES # BLD AUTO: 0.02 THOUSAND/UL (ref 0–0.2)
IMM GRANULOCYTES NFR BLD AUTO: 0 % (ref 0–2)
LYMPHOCYTES # BLD AUTO: 1.95 THOUSANDS/ΜL (ref 0.6–4.47)
LYMPHOCYTES NFR BLD AUTO: 29 % (ref 14–44)
MCH RBC QN AUTO: 32.4 PG (ref 26.8–34.3)
MCHC RBC AUTO-ENTMCNC: 32.9 G/DL (ref 31.4–37.4)
MCV RBC AUTO: 98 FL (ref 82–98)
MONOCYTES # BLD AUTO: 0.59 THOUSAND/ΜL (ref 0.17–1.22)
MONOCYTES NFR BLD AUTO: 9 % (ref 4–12)
NEUTROPHILS # BLD AUTO: 3.9 THOUSANDS/ΜL (ref 1.85–7.62)
NEUTS SEG NFR BLD AUTO: 59 % (ref 43–75)
NRBC BLD AUTO-RTO: 0 /100 WBCS
PLATELET # BLD AUTO: 260 THOUSANDS/UL (ref 149–390)
PMV BLD AUTO: 9.9 FL (ref 8.9–12.7)
RBC # BLD AUTO: 4.14 MILLION/UL (ref 3.81–5.12)
WBC # BLD AUTO: 6.67 THOUSAND/UL (ref 4.31–10.16)

## 2020-12-09 PROCEDURE — 85025 COMPLETE CBC W/AUTO DIFF WBC: CPT

## 2020-12-09 PROCEDURE — 36415 COLL VENOUS BLD VENIPUNCTURE: CPT

## 2020-12-10 PROCEDURE — NC001 PR NO CHARGE: Performed by: OBSTETRICS & GYNECOLOGY

## 2020-12-11 RX ORDER — PANTOPRAZOLE SODIUM 20 MG/1
20 TABLET, DELAYED RELEASE ORAL DAILY
COMMUNITY
End: 2022-03-21

## 2020-12-13 ENCOUNTER — ANESTHESIA EVENT (OUTPATIENT)
Dept: PERIOP | Facility: HOSPITAL | Age: 75
End: 2020-12-13
Payer: MEDICARE

## 2020-12-13 PROBLEM — K21.9 GASTROESOPHAGEAL REFLUX DISEASE: Status: ACTIVE | Noted: 2020-12-13

## 2020-12-13 PROBLEM — I10 HTN (HYPERTENSION): Status: ACTIVE | Noted: 2020-12-13

## 2020-12-14 ENCOUNTER — ANESTHESIA (OUTPATIENT)
Dept: PERIOP | Facility: HOSPITAL | Age: 75
End: 2020-12-14
Payer: MEDICARE

## 2020-12-14 ENCOUNTER — HOSPITAL ENCOUNTER (OUTPATIENT)
Facility: HOSPITAL | Age: 75
Setting detail: OUTPATIENT SURGERY
Discharge: HOME | End: 2020-12-14
Attending: OBSTETRICS & GYNECOLOGY | Admitting: OBSTETRICS & GYNECOLOGY
Payer: MEDICARE

## 2020-12-14 VITALS — HEART RATE: 87 BPM

## 2020-12-14 VITALS
SYSTOLIC BLOOD PRESSURE: 141 MMHG | TEMPERATURE: 98.4 F | WEIGHT: 194 LBS | BODY MASS INDEX: 30.45 KG/M2 | DIASTOLIC BLOOD PRESSURE: 73 MMHG | HEART RATE: 68 BPM | RESPIRATION RATE: 16 BRPM | HEIGHT: 67 IN | OXYGEN SATURATION: 97 %

## 2020-12-14 DIAGNOSIS — N93.9 ABNORMAL UTERINE BLEEDING DUE TO ENDOCERVICAL POLYP: ICD-10-CM

## 2020-12-14 DIAGNOSIS — N95.0 POST-MENOPAUSAL BLEEDING: ICD-10-CM

## 2020-12-14 DIAGNOSIS — N84.1 ABNORMAL UTERINE BLEEDING DUE TO ENDOCERVICAL POLYP: ICD-10-CM

## 2020-12-14 PROCEDURE — 58558 HYSTEROSCOPY BIOPSY: CPT | Performed by: OBSTETRICS & GYNECOLOGY

## 2020-12-14 PROCEDURE — 88305 TISSUE EXAM BY PATHOLOGIST: CPT | Performed by: PATHOLOGY

## 2020-12-14 RX ORDER — EPHEDRINE SULFATE 50 MG/ML
INJECTION INTRAVENOUS AS NEEDED
Status: DISCONTINUED | OUTPATIENT
Start: 2020-12-14 | End: 2020-12-14

## 2020-12-14 RX ORDER — FENTANYL CITRATE 50 UG/ML
INJECTION, SOLUTION INTRAMUSCULAR; INTRAVENOUS AS NEEDED
Status: DISCONTINUED | OUTPATIENT
Start: 2020-12-14 | End: 2020-12-14

## 2020-12-14 RX ORDER — ONDANSETRON 2 MG/ML
4 INJECTION INTRAMUSCULAR; INTRAVENOUS ONCE AS NEEDED
Status: DISCONTINUED | OUTPATIENT
Start: 2020-12-14 | End: 2020-12-14 | Stop reason: HOSPADM

## 2020-12-14 RX ORDER — KETOROLAC TROMETHAMINE 30 MG/ML
INJECTION, SOLUTION INTRAMUSCULAR; INTRAVENOUS AS NEEDED
Status: DISCONTINUED | OUTPATIENT
Start: 2020-12-14 | End: 2020-12-14

## 2020-12-14 RX ORDER — FENTANYL CITRATE/PF 50 MCG/ML
25 SYRINGE (ML) INJECTION
Status: DISCONTINUED | OUTPATIENT
Start: 2020-12-14 | End: 2020-12-14 | Stop reason: HOSPADM

## 2020-12-14 RX ORDER — LIDOCAINE HYDROCHLORIDE 10 MG/ML
INJECTION, SOLUTION EPIDURAL; INFILTRATION; INTRACAUDAL; PERINEURAL AS NEEDED
Status: DISCONTINUED | OUTPATIENT
Start: 2020-12-14 | End: 2020-12-14

## 2020-12-14 RX ORDER — ONDANSETRON 2 MG/ML
INJECTION INTRAMUSCULAR; INTRAVENOUS AS NEEDED
Status: DISCONTINUED | OUTPATIENT
Start: 2020-12-14 | End: 2020-12-14

## 2020-12-14 RX ORDER — DEXAMETHASONE SODIUM PHOSPHATE 10 MG/ML
INJECTION, SOLUTION INTRAMUSCULAR; INTRAVENOUS AS NEEDED
Status: DISCONTINUED | OUTPATIENT
Start: 2020-12-14 | End: 2020-12-14

## 2020-12-14 RX ORDER — PROPOFOL 10 MG/ML
INJECTION, EMULSION INTRAVENOUS AS NEEDED
Status: DISCONTINUED | OUTPATIENT
Start: 2020-12-14 | End: 2020-12-14

## 2020-12-14 RX ORDER — SODIUM CHLORIDE, SODIUM LACTATE, POTASSIUM CHLORIDE, CALCIUM CHLORIDE 600; 310; 30; 20 MG/100ML; MG/100ML; MG/100ML; MG/100ML
50 INJECTION, SOLUTION INTRAVENOUS CONTINUOUS
Status: DISCONTINUED | OUTPATIENT
Start: 2020-12-14 | End: 2020-12-14 | Stop reason: HOSPADM

## 2020-12-14 RX ORDER — FENTANYL CITRATE/PF 50 MCG/ML
50 SYRINGE (ML) INJECTION
Status: DISCONTINUED | OUTPATIENT
Start: 2020-12-14 | End: 2020-12-14 | Stop reason: HOSPADM

## 2020-12-14 RX ADMIN — FENTANYL CITRATE 25 MCG: 50 INJECTION INTRAMUSCULAR; INTRAVENOUS at 08:07

## 2020-12-14 RX ADMIN — PHENYLEPHRINE HYDROCHLORIDE 20 MCG/MIN: 10 INJECTION INTRAVENOUS at 08:26

## 2020-12-14 RX ADMIN — Medication 25 MCG: at 09:01

## 2020-12-14 RX ADMIN — PROPOFOL 50 MG: 10 INJECTION, EMULSION INTRAVENOUS at 08:11

## 2020-12-14 RX ADMIN — PROPOFOL 100 MG: 10 INJECTION, EMULSION INTRAVENOUS at 08:09

## 2020-12-14 RX ADMIN — ONDANSETRON 4 MG: 2 INJECTION INTRAMUSCULAR; INTRAVENOUS at 08:39

## 2020-12-14 RX ADMIN — SODIUM CHLORIDE, SODIUM LACTATE, POTASSIUM CHLORIDE, AND CALCIUM CHLORIDE: .6; .31; .03; .02 INJECTION, SOLUTION INTRAVENOUS at 07:59

## 2020-12-14 RX ADMIN — EPHEDRINE SULFATE 5 MG: 50 INJECTION, SOLUTION INTRAVENOUS at 08:26

## 2020-12-14 RX ADMIN — PROPOFOL 50 MG: 10 INJECTION, EMULSION INTRAVENOUS at 08:10

## 2020-12-14 RX ADMIN — LIDOCAINE HYDROCHLORIDE 50 MG: 10 INJECTION, SOLUTION EPIDURAL; INFILTRATION; INTRACAUDAL; PERINEURAL at 08:10

## 2020-12-14 RX ADMIN — KETOROLAC TROMETHAMINE 15 MG: 30 INJECTION, SOLUTION INTRAMUSCULAR at 08:48

## 2020-12-14 RX ADMIN — DEXAMETHASONE SODIUM PHOSPHATE 5 MG: 10 INJECTION, SOLUTION INTRAMUSCULAR; INTRAVENOUS at 08:11

## 2020-12-14 RX ADMIN — EPHEDRINE SULFATE 5 MG: 50 INJECTION, SOLUTION INTRAVENOUS at 08:32

## 2020-12-14 RX ADMIN — PHENYLEPHRINE HYDROCHLORIDE 50 MCG: 10 INJECTION INTRAVENOUS at 08:23

## 2020-12-14 RX ADMIN — FENTANYL CITRATE 25 MCG: 50 INJECTION INTRAMUSCULAR; INTRAVENOUS at 08:23

## 2020-12-21 ENCOUNTER — OFFICE VISIT (OUTPATIENT)
Dept: OBGYN CLINIC | Facility: CLINIC | Age: 75
End: 2020-12-21

## 2020-12-21 VITALS
BODY MASS INDEX: 30.13 KG/M2 | WEIGHT: 192 LBS | DIASTOLIC BLOOD PRESSURE: 86 MMHG | SYSTOLIC BLOOD PRESSURE: 124 MMHG | HEIGHT: 67 IN

## 2020-12-21 DIAGNOSIS — Z09 POSTOP CHECK: Primary | ICD-10-CM

## 2020-12-21 PROCEDURE — 99024 POSTOP FOLLOW-UP VISIT: CPT | Performed by: OBSTETRICS & GYNECOLOGY

## 2021-06-30 ENCOUNTER — TELEPHONE (OUTPATIENT)
Dept: GASTROENTEROLOGY | Facility: CLINIC | Age: 76
End: 2021-06-30

## 2021-07-02 ENCOUNTER — APPOINTMENT (EMERGENCY)
Dept: RADIOLOGY | Facility: HOSPITAL | Age: 76
End: 2021-07-02
Payer: COMMERCIAL

## 2021-07-02 ENCOUNTER — HOSPITAL ENCOUNTER (EMERGENCY)
Facility: HOSPITAL | Age: 76
Discharge: HOME | End: 2021-07-02
Attending: EMERGENCY MEDICINE | Admitting: EMERGENCY MEDICINE
Payer: COMMERCIAL

## 2021-07-02 VITALS
OXYGEN SATURATION: 97 % | DIASTOLIC BLOOD PRESSURE: 81 MMHG | TEMPERATURE: 97.5 F | HEART RATE: 66 BPM | BODY MASS INDEX: 31.01 KG/M2 | SYSTOLIC BLOOD PRESSURE: 173 MMHG | WEIGHT: 198 LBS | RESPIRATION RATE: 20 BRPM

## 2021-07-02 DIAGNOSIS — R42 LIGHTHEADEDNESS: Primary | ICD-10-CM

## 2021-07-02 LAB
ALBUMIN SERPL BCP-MCNC: 3.8 G/DL (ref 3.5–5)
ALP SERPL-CCNC: 62 U/L (ref 46–116)
ALT SERPL W P-5'-P-CCNC: 29 U/L (ref 12–78)
ANION GAP SERPL CALCULATED.3IONS-SCNC: 5 MMOL/L (ref 4–13)
AST SERPL W P-5'-P-CCNC: 18 U/L (ref 5–45)
BASOPHILS # BLD AUTO: 0.07 THOUSANDS/ΜL (ref 0–0.1)
BASOPHILS NFR BLD AUTO: 1 % (ref 0–1)
BILIRUB SERPL-MCNC: 1.22 MG/DL (ref 0.2–1)
BUN SERPL-MCNC: 15 MG/DL (ref 5–25)
CALCIUM SERPL-MCNC: 9.3 MG/DL (ref 8.3–10.1)
CHLORIDE SERPL-SCNC: 106 MMOL/L (ref 100–108)
CO2 SERPL-SCNC: 27 MMOL/L (ref 21–32)
CREAT SERPL-MCNC: 0.81 MG/DL (ref 0.6–1.3)
EOSINOPHIL # BLD AUTO: 0.12 THOUSAND/ΜL (ref 0–0.61)
EOSINOPHIL NFR BLD AUTO: 2 % (ref 0–6)
ERYTHROCYTE [DISTWIDTH] IN BLOOD BY AUTOMATED COUNT: 13.5 % (ref 11.6–15.1)
GFR SERPL CREATININE-BSD FRML MDRD: 71 ML/MIN/1.73SQ M
GLUCOSE SERPL-MCNC: 95 MG/DL (ref 65–140)
HCT VFR BLD AUTO: 44.4 % (ref 34.8–46.1)
HGB BLD-MCNC: 14.7 G/DL (ref 11.5–15.4)
IMM GRANULOCYTES # BLD AUTO: 0.02 THOUSAND/UL (ref 0–0.2)
IMM GRANULOCYTES NFR BLD AUTO: 0 % (ref 0–2)
LYMPHOCYTES # BLD AUTO: 1.9 THOUSANDS/ΜL (ref 0.6–4.47)
LYMPHOCYTES NFR BLD AUTO: 24 % (ref 14–44)
MCH RBC QN AUTO: 32.5 PG (ref 26.8–34.3)
MCHC RBC AUTO-ENTMCNC: 33.1 G/DL (ref 31.4–37.4)
MCV RBC AUTO: 98 FL (ref 82–98)
MONOCYTES # BLD AUTO: 0.49 THOUSAND/ΜL (ref 0.17–1.22)
MONOCYTES NFR BLD AUTO: 6 % (ref 4–12)
NEUTROPHILS # BLD AUTO: 5.39 THOUSANDS/ΜL (ref 1.85–7.62)
NEUTS SEG NFR BLD AUTO: 67 % (ref 43–75)
NRBC BLD AUTO-RTO: 0 /100 WBCS
PLATELET # BLD AUTO: 260 THOUSANDS/UL (ref 149–390)
PMV BLD AUTO: 9.5 FL (ref 8.9–12.7)
POTASSIUM SERPL-SCNC: 3.9 MMOL/L (ref 3.5–5.3)
PROT SERPL-MCNC: 7.9 G/DL (ref 6.4–8.2)
RBC # BLD AUTO: 4.53 MILLION/UL (ref 3.81–5.12)
SODIUM SERPL-SCNC: 138 MMOL/L (ref 136–145)
TROPONIN I SERPL-MCNC: <0.02 NG/ML
WBC # BLD AUTO: 7.99 THOUSAND/UL (ref 4.31–10.16)

## 2021-07-02 PROCEDURE — 70498 CT ANGIOGRAPHY NECK: CPT

## 2021-07-02 PROCEDURE — 99285 EMERGENCY DEPT VISIT HI MDM: CPT | Performed by: EMERGENCY MEDICINE

## 2021-07-02 PROCEDURE — 71045 X-RAY EXAM CHEST 1 VIEW: CPT

## 2021-07-02 PROCEDURE — 85025 COMPLETE CBC W/AUTO DIFF WBC: CPT | Performed by: EMERGENCY MEDICINE

## 2021-07-02 PROCEDURE — 36415 COLL VENOUS BLD VENIPUNCTURE: CPT

## 2021-07-02 PROCEDURE — 70496 CT ANGIOGRAPHY HEAD: CPT

## 2021-07-02 PROCEDURE — 99285 EMERGENCY DEPT VISIT HI MDM: CPT

## 2021-07-02 PROCEDURE — 84484 ASSAY OF TROPONIN QUANT: CPT | Performed by: EMERGENCY MEDICINE

## 2021-07-02 PROCEDURE — G1004 CDSM NDSC: HCPCS

## 2021-07-02 PROCEDURE — 80053 COMPREHEN METABOLIC PANEL: CPT | Performed by: EMERGENCY MEDICINE

## 2021-07-02 PROCEDURE — 93005 ELECTROCARDIOGRAM TRACING: CPT

## 2021-07-02 RX ADMIN — IOHEXOL 85 ML: 350 INJECTION, SOLUTION INTRAVENOUS at 22:09

## 2021-07-03 LAB
ATRIAL RATE: 63 BPM
P AXIS: 70 DEGREES
PR INTERVAL: 164 MS
QRS AXIS: -11 DEGREES
QRSD INTERVAL: 94 MS
QT INTERVAL: 402 MS
QTC INTERVAL: 408 MS
T WAVE AXIS: 27 DEGREES
VENTRICULAR RATE: 62 BPM

## 2021-07-03 PROCEDURE — 93010 ELECTROCARDIOGRAM REPORT: CPT | Performed by: INTERNAL MEDICINE

## 2021-07-03 NOTE — ED ATTENDING ATTESTATION
7/2/2021  Blessing Orozco DO, saw and evaluated the patient  I have discussed the patient with the resident/non-physician practitioner and agree with the resident's/non-physician practitioner's findings, Plan of Care, and MDM as documented in the resident's/non-physician practitioner's note, except where noted  All available labs and Radiology studies were reviewed  I was present for key portions of any procedure(s) performed by the resident/non-physician practitioner and I was immediately available to provide assistance  At this point I agree with the current assessment done in the Emergency Department  I have conducted an independent evaluation of this patient a history and physical is as follows:    77-year-old female presents for evaluation of episodes of lightheadedness earlier today  Currently symptoms are resolved  No alleviating or exacerbating factors  Denies headache, visual changes, speech changes, neck pain or stiffness, focal weakness, numbness, tingling, chest pain, shortness of breath, abdominal pain  Had recent stress test which was negative  No other complaints this time  No RAPD  Extraocular movements intact  No carotid bruits  Heart +S1S2 RRR no MCRG  Abdomen soft, nontender, nondistended  2+ radial pulses bilaterally, 2+ DP and PT pulses bilaterally  EHL 5/5 bilaterally  No drift of bilateral lower extremities  No drift of bilateral upper extremities  Impression:  Lightheadedness unclear etiology plan:  Cardiac workup, CTA head and neck            ED Course         Critical Care Time  Procedures

## 2021-07-05 NOTE — ED PROVIDER NOTES
History  Chief Complaint   Patient presents with    Dizziness     pt c/o feeling dizzy when getting up to walk at work earlier today, also c/o nausea  denies cp/sob/any other symptoms     75 yo F with h/o HTN, HLD, prior vertigo, presenting for evaluation of lightheadedness earlier today that has since resolved, mostly positional in nature that did not feel like her prior vertigo episodes  She states that it started earlier this afternoon, and lasted until she was in the waiting room here  Did not do anything to make it stop  Does report she hadn't been eating well today because she had been busy with work, perhaps less fluid intake as well  Did not have any associated headache  No associated chest pain, sob, cough, diaphoresis  Felt like she might pass out but has not actually passed out  Denies any numbness, tingling, weakness, visual changes, or other neurologic complaints  Does not feel off balance or like things are moving  ROS otherwise neg as below  History provided by:  Patient   used: No    Dizziness  Associated symptoms: no chest pain, no diarrhea, no headaches, no nausea, no palpitations, no shortness of breath, no vomiting and no weakness        Prior to Admission Medications   Prescriptions Last Dose Informant Patient Reported? Taking?    Ascorbic Acid (VITAMIN C PO)   Yes Yes   Sig: Take 1 tablet by mouth daily   B Complex Vitamins (VITAMIN B COMPLEX) TABS  Self Yes Yes   Sig: Take 1 tablet by mouth daily   Cholecalciferol ([employer] VITAMIN D) 2000 units CAPS  Self Yes Yes   Sig: Take by mouth daily   Coenzyme Q10 (CO Q 10 PO)  Self Yes Yes   Sig: Take 1 tablet by mouth daily    Probiotic Product (ALIGN PO)  Self Yes Yes   Sig: Take 1 tablet by mouth daily    amLODIPine (NORVASC) 5 mg tablet   Yes Yes   Sig: Take 5 mg by mouth daily   aspirin 81 MG tablet   Yes Yes   Sig: Take 81 mg by mouth daily    pantoprazole (PROTONIX) 20 mg tablet   Yes Yes   Sig: Take 20 mg by mouth daily Facility-Administered Medications: None       Past Medical History:   Diagnosis Date    Abnormal Pap smear of cervix     GERD (gastroesophageal reflux disease)     Hyperlipidemia     Hypertension     Incisional hernia        Past Surgical History:   Procedure Laterality Date    COLOSTOMY  2011    partial, end, distal segment closure    ENTEROSTOMY CLOSURE      with anastomosis    HERNIA REPAIR  2012/2013/2014    HERNIA REPAIR      Pt states with a strangled hernia    HIP SURGERY Right 2010    HIP SURGERY Left 2011    INCISIONAL HERNIA REPAIR      Incarcerated    NH DILATION OF VAGINA W ANESTH N/A 12/14/2020    Procedure: EXAM UNDER ANESTHESIA (EUA); Surgeon: Guillermina Guillaume DO;  [facility];  Service: Gynecology    NH HYSTEROSCOPY,RMV MYOMA N/A 12/14/2020    Procedure: endometrial polypectomy;  Surgeon: Guillermina Guillaume DO;  [facility];  Service: Gynecology    NH HYSTEROSCOPY,W/ENDO BX N/A 12/14/2020    Procedure: DILATATION AND CURETTAGE (D&C) WITH HYSTEROSCOPY;  Surgeon: Guillermina Guillaume DO;  [facility];  Service: Gynecology    TOTAL HIP ARTHROPLASTY Bilateral     2011, 2012       Family History   Problem Relation Age of Onset    Heart disease Mother     Coronary artery disease Mother     Stroke Mother     Hypertension Mother     Heart failure Father     Coronary artery disease Father     Colon cancer Maternal Uncle 61    No Known Problems Maternal Grandmother     No Known Problems Maternal Grandfather     No Known Problems Paternal Grandmother     No Known Problems Paternal Grandfather     No Known Problems Son     No Known Problems Son      I have reviewed and agree with the history as documented      E-Cigarette/Vaping    E-Cigarette Use Never User      E-Cigarette/Vaping Substances     Social History     Tobacco Use    Smoking status: Former Smoker    Smokeless tobacco: Never Used    Tobacco comment: Pt states with hx 40 years ago   Vaping Use  Vaping Use: Never used   Substance Use Topics    Alcohol use: Yes     Comment: weekly, occasional use    Drug use: Never        Review of Systems   Constitutional: Negative for chills, fatigue and fever  HENT: Negative for congestion and sore throat  Eyes: Negative for visual disturbance  Respiratory: Negative for shortness of breath and wheezing  Cardiovascular: Negative for chest pain and palpitations  Gastrointestinal: Negative for abdominal pain, diarrhea, nausea and vomiting  Genitourinary: Negative for dysuria and hematuria  Musculoskeletal: Negative for back pain and gait problem  Skin: Negative for pallor and rash  Neurological: Positive for light-headedness  Negative for dizziness, syncope, weakness and headaches  Psychiatric/Behavioral: Negative for confusion  The patient is not nervous/anxious  Physical Exam  ED Triage Vitals [07/02/21 1624]   Temperature Pulse Respirations Blood Pressure SpO2   97 5 °F (36 4 °C) 67 18 167/84 99 %      Temp Source Heart Rate Source Patient Position - Orthostatic VS BP Location FiO2 (%)   Tympanic Monitor Sitting Left arm --      Pain Score       No Pain             Orthostatic Vital Signs  Vitals:    07/02/21 1624 07/02/21 1958 07/02/21 2100   BP: 167/84 120/81 (!) 173/81   Pulse: 67 70 66   Patient Position - Orthostatic VS: Sitting Lying Lying       Physical Exam  Vitals and nursing note reviewed  Constitutional:       General: She is not in acute distress  Appearance: Normal appearance  She is well-developed  She is not ill-appearing or diaphoretic  HENT:      Head: Normocephalic and atraumatic  Right Ear: External ear normal       Left Ear: External ear normal       Nose: Nose normal       Mouth/Throat:      Mouth: Mucous membranes are moist    Eyes:      Extraocular Movements: Extraocular movements intact  Conjunctiva/sclera: Conjunctivae normal       Pupils: Pupils are equal, round, and reactive to light  Cardiovascular:      Rate and Rhythm: Normal rate and regular rhythm  Heart sounds: Normal heart sounds  No murmur heard  Pulmonary:      Effort: Pulmonary effort is normal  No respiratory distress  Breath sounds: Normal breath sounds  No wheezing or rales  Abdominal:      General: Bowel sounds are normal  There is no distension  Palpations: Abdomen is soft  Tenderness: There is no abdominal tenderness  Musculoskeletal:         General: Normal range of motion  Cervical back: Normal range of motion  Skin:     General: Skin is warm and dry  Findings: No rash  Neurological:      General: No focal deficit present  Mental Status: She is alert and oriented to person, place, and time  Cranial Nerves: No cranial nerve deficit  Sensory: No sensory deficit  Motor: No weakness or abnormal muscle tone  Coordination: Coordination normal       Gait: Gait normal       Comments: No dysmetria  Normal gait  EOM Intact  No facial droop, no pronator drift  Normal 5/5 strength in b/l upper and lower extremities      Psychiatric:         Mood and Affect: Mood normal          ED Medications  Medications   iohexol (OMNIPAQUE) 350 MG/ML injection (SINGLE-DOSE) 85 mL (85 mL Intravenous Given 7/2/21 2209)       Diagnostic Studies  Results Reviewed     Procedure Component Value Units Date/Time    Comprehensive metabolic panel [[id number]]  (Abnormal) Collected: 07/02/21 2010    Lab Status: Final result Specimen: Blood from Arm, Left Updated: 07/02/21 2127     Sodium 138 mmol/L      Potassium 3 9 mmol/L      Chloride 106 mmol/L      CO2 27 mmol/L      ANION GAP 5 mmol/L      BUN 15 mg/dL      Creatinine 0 81 mg/dL      Glucose 95 mg/dL      Calcium 9 3 mg/dL      AST 18 U/L      ALT 29 U/L      Alkaline Phosphatase 62 U/L      Total Protein 7 9 g/dL      Albumin 3 8 g/dL      Total Bilirubin 1 22 mg/dL      eGFR 71 ml/min/1 73sq m     Narrative:      National Kidney Disease Foundation guidelines for Chronic Kidney Disease (CKD):     Stage 1 with normal or high GFR (GFR > 90 mL/min/1 73 square meters)    Stage 2 Mild CKD (GFR = 60-89 mL/min/1 73 square meters)    Stage 3A Moderate CKD (GFR = 45-59 mL/min/1 73 square meters)    Stage 3B Moderate CKD (GFR = 30-44 mL/min/1 73 square meters)    Stage 4 Severe CKD (GFR = 15-29 mL/min/1 73 square meters)    Stage 5 End Stage CKD (GFR <15 mL/min/1 73 square meters)  Note: GFR calculation is accurate only with a steady state creatinine    Troponin I [[id number]]  (Normal) Collected: 07/02/21 2010    Lab Status: Final result Specimen: Blood from Arm, Left Updated: 07/02/21 2104     Troponin I <0 02 ng/mL     CBC and differential [[id number]] Collected: 07/02/21 2010    Lab Status: Final result Specimen: Blood from Arm, Left Updated: 07/02/21 2036     WBC 7 99 Thousand/uL      RBC 4 53 Million/uL      Hemoglobin 14 7 g/dL      Hematocrit 44 4 %      MCV 98 fL      MCH 32 5 pg      MCHC 33 1 g/dL      RDW 13 5 %      MPV 9 5 fL      Platelets 951 Thousands/uL      nRBC 0 /100 WBCs      Neutrophils Relative 67 %      Immat GRANS % 0 %      Lymphocytes Relative 24 %      Monocytes Relative 6 %      Eosinophils Relative 2 %      Basophils Relative 1 %      Neutrophils Absolute 5 39 Thousands/µL      Immature Grans Absolute 0 02 Thousand/uL      Lymphocytes Absolute 1 90 Thousands/µL      Monocytes Absolute 0 49 Thousand/µL      Eosinophils Absolute 0 12 Thousand/µL      Basophils Absolute 0 07 Thousands/µL                  CTA head and neck with and without contrast   Final Result by Deepali Root MD (07/02 5649)      No acute intracranial hemorrhage, midline shift, or mass effect  Mild atherosclerotic calcification of the carotid bifurcations  No hemodynamically significant stenosis of the arteries of the neck  No high-grade proximal stenosis of the visualized [place]     No significant intracranial arteriovenous malformation or aneurysm  Workstation performed: [id number]         XR chest 1 view portable   Final Result by Cari Valles MD (21/60 3605)      No acute cardiopulmonary disease  Workstation performed: [id number]               Procedures  ECG 12 Lead Documentation Only    Date/Time: 7/2/2021 11:02 PM  Performed by: Babak Wilkes MD  Authorized by: Babak Wilkes MD     Indications / Diagnosis:  Lightheadedness   Patient location:  ED  Previous ECG:     Previous ECG:  Compared to current    Similarity:  No change  Interpretation:     Interpretation: normal    Rate:     ECG rate assessment: normal    Rhythm:     Rhythm: sinus rhythm    Ectopy:     Ectopy: none    QRS:     QRS axis:  Normal  Conduction:     Conduction: normal    ST segments:     ST segments:  Normal  T waves:     T waves: inverted      Inverted:  III  Comments:      NSR          ED Course             HEART Risk Score      Most Recent Value   Heart Score Risk Calculator   History  0 Filed at: 07/04/2021 2305   ECG  0 Filed at: 07/04/2021 2305   Age  2 Filed at: 07/04/2021 2305   Risk Factors  2 Filed at: 07/04/2021 2305   Troponin  0 Filed at: 07/04/2021 2305   HEART Score  4 Filed at: 07/04/2021 2305        Identification of Seniors at Risk      Most Recent Value   (ISAR) Identification of Seniors at Risk   Before the illness or injury that brought you to the Emergency, did you need someone to help you on a regular basis? 0 Filed at: 07/02/2021 1625   In the last 24 hours, have you needed more help than usual?  0 Filed at: 07/02/2021 1625   Have you been hospitalized for one or more nights during the past 6 months? 0 Filed at: 07/02/2021 1625   In general, do you see well?  0 Filed at: 07/02/2021 1625   In general, do you have serious problems with your memory? 0 Filed at: 07/02/2021 1625   Do you take more than three different medications every day?   1 Filed at: 07/02/2021 1625   ISAR Score  1 Filed at: 07/02/2021 1625 SBIRT 22yo+      Most Recent Value   SBIRT (22 yo +)   In order to provide better care to our patients, we are screening all of our patients for alcohol and drug use  Would it be okay to ask you these screening questions? No Filed at: 07/02/2021 [address] [place] [address]  Number of Diagnoses or Management Options  Lightheadedness  Diagnosis management comments: 77 yo F presenting for lightheadedness that occurred earlier today and has since resolved, no associated other symptoms, did not pass out  Normal VS at this time  ECG WNL, troponin negative  Normal neuro exam, symptoms were not vertiginous in nature although she has a h/o vertigo, this felt different than that  Possibly related to decreased PO intake  CTA head/neck negative for acute findings  Discussed strict return precautions including signs/symptoms of CVA, recommended f/u with PCP for repeat evaluation  Discharged  Disposition  Final diagnoses:   Lightheadedness     Time reflects when diagnosis was documented in both MDM as applicable and the Disposition within this note     Time User Action Codes Description Comment    7/2/2021 11:33 PM Nelson Ruvalcaba Add [[id number]] [facility]       ED Disposition     ED Disposition Condition Date/Time Comment    Discharge Good Fri Jul 2, 2021 11:33 PM Viky Hemp discharge to home/self care  Follow-up Information     Follow up With Specialties Details Why Contact Info Additional [address] [facility] [address], DO Internal Medicine Schedule an appointment as soon as possible for a visit in 3 days For reevaluation as we discussed   [facility] Emergency Medicine Go to  As needed [address]  [address] [facility] [address] Emergency Department, [address], [place] [address]          Discharge Medication List as of 7/2/2021 11:34 PM      CONTINUE these medications which have NOT CHANGED    Details   amLODIPine (NORVASC) 5 mg tablet Take 5 mg by mouth daily, Starting u 4/11/2019, Historical Med      Ascorbic Acid (VITAMIN C PO) Take 1 tablet by mouth daily, Historical Med      aspirin 81 MG tablet Take 81 mg by mouth daily , Historical Med      B Complex Vitamins (VITAMIN B COMPLEX) TABS Take 1 tablet by mouth daily, Historical Med      Cholecalciferol ([employer] VITAMIN D) 2000 units CAPS Take by mouth daily, Historical Med      Coenzyme Q10 (CO Q 10 PO) Take 1 tablet by mouth daily , Historical Med      pantoprazole (PROTONIX) 20 mg tablet Take 20 mg by mouth daily, Historical Med      Probiotic Product (ALIGN PO) Take 1 tablet by mouth daily , Historical Med           No discharge procedures on file  PDMP Review     None           ED Provider  Attending physically available and evaluated Sera Nicole I managed the patient along with the ED Attending      Electronically Signed by         Jacqueline Carbone MD  07/04/21 1884

## 2021-07-26 ENCOUNTER — CONSULT (OUTPATIENT)
Dept: GASTROENTEROLOGY | Facility: CLINIC | Age: 76
End: 2021-07-26
Payer: COMMERCIAL

## 2021-07-26 VITALS
BODY MASS INDEX: 31.23 KG/M2 | HEIGHT: 67 IN | WEIGHT: 199 LBS | TEMPERATURE: 98.1 F | HEART RATE: 62 BPM | SYSTOLIC BLOOD PRESSURE: 126 MMHG | DIASTOLIC BLOOD PRESSURE: 81 MMHG

## 2021-07-26 DIAGNOSIS — Z12.11 SCREENING FOR COLON CANCER: ICD-10-CM

## 2021-07-26 DIAGNOSIS — K21.9 GASTROESOPHAGEAL REFLUX DISEASE, UNSPECIFIED WHETHER ESOPHAGITIS PRESENT: Primary | ICD-10-CM

## 2021-07-26 PROCEDURE — 99214 OFFICE O/P EST MOD 30 MIN: CPT | Performed by: INTERNAL MEDICINE

## 2021-07-26 RX ORDER — ROSUVASTATIN CALCIUM 10 MG/1
10 TABLET, COATED ORAL DAILY
COMMUNITY

## 2021-07-26 RX ORDER — POLYETHYLENE GLYCOL 3350 17 G/17G
POWDER, FOR SOLUTION ORAL
Qty: 238 G | Refills: 0 | Status: SHIPPED | OUTPATIENT
Start: 2021-07-26 | End: 2022-03-21

## 2021-07-26 NOTE — PROGRESS NOTES
[facility] - Outpatient Consultation  Tanja Betts 76 y o  female MRN: [id number]  Encounter: [id number]          ASSESSMENT AND PLAN:          1  Gastroesophageal reflux disease, unspecified whether esophagitis present  EGD   2  Screening for colon cancer  Colonoscopy    polyethylene glycol (GLYCOLAX) 17 GM/SCOOP powder       Discussed lifestyle interventions for reducing reflux including:  a  Loss of a few pounds to help reduce heartburn and regurgitation  b  Avoid late night meals  c  Elevate head of bed by 30 degrees  d  Avoid (or limit) foods that can trigger reflux including: citrus fruits, garlic/onions, carbonated beverages, caffeinated drinks, chocolate, mints, alcohol, high fat foods, and milk      Instructed her to take her pantoprazole half hour before her dinner  Patient also due for colorectal cancer screening  Will schedule for colonoscopy  The rationale for endoscopy with possible biopsy, possible polypectomy, with IV sedation as well as all risks, benefits, and alternatives were discussed with the patient in detail  Risks including but not limited to perforation, bleeding, need for blood transfusion or emergent surgery, and missed neoplasm were reviewed in detail with the patient  The patient demonstrates full understanding and wishes to proceed  ______________________________________________________________    HPI:  Tanja Betts is a 76y o  year old female who presents to the office for evaluation of gastroesophageal reflux disease, abdominal pain and bloating  She had persistent chest discomfort  She saw cardiology and had a pharmacologic stress test   Since she's realized it isn't her heart she felt much better and her symptoms have actually improved      She does take pantoprazole 20 mg in the morning with Align (probiotic) around 8-10 am, not timed before her meal  She gets acid reflux at night especially if she has alcohol during the evening  She said it was getting really bad and this has improved since starting pantoprazole and knowing it wasn't her heart (the anxiety factor)  She had an EGD at [facility] 20 years ago and that showed a hiatal hernia and will occasionally get a spasm when food goes down  They have a past medical history of HLD and HTN and follows with their PCP Dr Lobito Tabares, DO  She had perforated bowel 8 years ago and had partial bowel obstruction  Her last colonoscopy was with Dr Gay Small 5-6 years ago and is due for next colonoscopy  REVIEW OF SYSTEMS:    CONSTITUTIONAL: Denies any fever, chills, rigors, and weight loss  HEENT: No earache or tinnitus  Denies hearing loss or visual disturbances  CARDIOVASCULAR: No chest pain or palpitations  RESPIRATORY: Denies any cough, hemoptysis, shortness of breath or dyspnea on exertion  GASTROINTESTINAL: As noted in the History of Present Illness  GENITOURINARY: No problems with urination  Denies any hematuria or dysuria  NEUROLOGIC: No dizziness or vertigo, denies headaches  MUSCULOSKELETAL: Denies any muscle or joint pain  SKIN: Denies skin rashes or itching  ENDOCRINE: Denies excessive thirst  Denies intolerance to heat or cold  PSYCHOSOCIAL: Denies depression or anxiety  Denies any recent memory loss  Historical Information   Past Medical History:   Diagnosis Date    Abnormal Pap smear of cervix     GERD (gastroesophageal reflux disease)     Hyperlipidemia     Hypertension     Incisional hernia      Past Surgical History:   Procedure Laterality Date    COLOSTOMY  2011    partial, end, distal segment closure    ENTEROSTOMY CLOSURE      with anastomosis    HERNIA REPAIR  2012/2013/2014    HERNIA REPAIR      Pt states with a strangled hernia    HIP SURGERY Right 2010    HIP SURGERY Left 2011    INCISIONAL HERNIA REPAIR      Incarcerated    TN DILATION OF VAGINA W ANESTH N/A 12/14/2020    Procedure: EXAM UNDER ANESTHESIA (EUA);   Surgeon: Susanne Arias DO;  [facility];  Service: Gynecology    CO HYSTEROSCOPY,RMV MYOMA N/A 12/14/2020    Procedure: endometrial polypectomy;  Surgeon: Susanne Arias DO;  [facility];  Service: Gynecology    CO HYSTEROSCOPY,W/ENDO BX N/A 12/14/2020    Procedure: DILATATION AND CURETTAGE (D&C) WITH HYSTEROSCOPY;  Surgeon: Susanne Arias DO;  [facility];  Service: Gynecology    TOTAL HIP ARTHROPLASTY Bilateral     2011, 2012     Social History   Social History     Substance and Sexual Activity   Alcohol Use Yes    Comment: weekly, occasional use     Social History     Substance and Sexual Activity   Drug Use Never     Social History     Tobacco Use   Smoking Status Former Smoker   Smokeless Tobacco Never Used   Tobacco Comment    Pt states with hx 40 years ago     Family History   Problem Relation Age of Onset    Heart disease Mother     Coronary artery disease Mother     Stroke Mother     Hypertension Mother     Heart failure Father     Coronary artery disease Father     Colon cancer Maternal Uncle 61    No Known Problems Maternal Grandmother     No Known Problems Maternal Grandfather     No Known Problems Paternal Grandmother     No Known Problems Paternal Grandfather     No Known Problems Son     No Known Problems Son        Meds/Allergies       Current Outpatient Medications:     amLODIPine (NORVASC) 5 mg tablet    Ascorbic Acid (VITAMIN C PO)    aspirin 81 MG tablet    B Complex Vitamins (VITAMIN B COMPLEX) TABS    Cholecalciferol ([employer] VITAMIN D) 2000 units CAPS    Coenzyme Q10 (CO Q 10 PO)    pantoprazole (PROTONIX) 20 mg tablet    Probiotic Product (ALIGN PO)    rosuvastatin (CRESTOR) 10 MG tablet    Allergies   Allergen Reactions    Iodides Lip Swelling    Iodine - Food Allergy Lip Swelling    Other            Objective     Blood pressure 126/81, pulse 62, temperature 98 1 °F (36 7 °C), temperature source Tympanic, height 5' 7" (1 702 m), weight 90 3 kg (199 lb), not currently breastfeeding  Body mass index is 31 17 kg/m²  PHYSICAL EXAM:      General Appearance:   Alert, cooperative, no distress   HEENT:   Normocephalic, atraumatic, anicteric  Lungs:   Equal chest rise and unlabored breathing, normal cough   Heart:   No visualized JVD   Abdomen:   Soft, non-tender, non-distended; no masses, no organomegaly    Genitalia:   Deferred    Rectal:   Deferred    Extremities:  No cyanosis, clubbing or edema    Pulses:  Musculoskeletal:  2+ and symmetric  Normal range of motion visualized    Skin:  Neuro:  No jaundice, rashes, or lesions   Alert and appropriate       Lab Results:   No visits with results within 1 Day(s) from this visit     Latest known visit with results is:   Admission on 07/02/2021, Discharged on 07/02/2021   Component Date Value    WBC 07/02/2021 7 99     RBC 07/02/2021 4 53     Hemoglobin 07/02/2021 14 7     Hematocrit 07/02/2021 44 4     MCV 07/02/2021 98     MCH 07/02/2021 32 5     MCHC 07/02/2021 33 1     RDW 07/02/2021 13 5     MPV 07/02/2021 9 5     Platelets 62/43/5340 260     nRBC 07/02/2021 0     Neutrophils Relative 07/02/2021 67     Immat GRANS % 07/02/2021 0     Lymphocytes Relative 07/02/2021 24     Monocytes Relative 07/02/2021 6     Eosinophils Relative 07/02/2021 2     Basophils Relative 07/02/2021 1     Neutrophils Absolute 07/02/2021 5 39     Immature Grans Absolute 07/02/2021 0 02     Lymphocytes Absolute 07/02/2021 1 90     Monocytes Absolute 07/02/2021 0 49     Eosinophils Absolute 07/02/2021 0 12     Basophils Absolute 07/02/2021 0 07     Sodium 07/02/2021 138     Potassium 07/02/2021 3 9     Chloride 07/02/2021 106     CO2 07/02/2021 27     ANION GAP 07/02/2021 5     BUN 07/02/2021 15     Creatinine 07/02/2021 0 81     Glucose 07/02/2021 95     Calcium 07/02/2021 9 3     AST 07/02/2021 18     ALT 07/02/2021 29     Alkaline Phosphatase 07/02/2021 62     Total Protein 07/02/2021 7  9     Albumin 07/02/2021 3 8     Total Bilirubin 07/02/2021 1 22*    eGFR 07/02/2021 71     Troponin I 07/02/2021 <0 02     Ventricular Rate 07/02/2021 62     Atrial Rate 07/02/2021 63     WY Interval 07/02/2021 164     QRSD Interval 07/02/2021 94     QT Interval 07/02/2021 402     QTC Interval 07/02/2021 408     P Axis 07/02/2021 70     QRS Axis 07/02/2021 -11     T Wave Axis 07/02/2021 27          Radiology Results:   CTA head and neck with and without contrast    Result Date: 7/2/2021  Narrative: CTA NECK AND BRAIN WITH AND WITHOUT CONTRAST INDICATION: Carotid or vertebral dissection suspected COMPARISON:   CT of the head on July 18, 2013  TECHNIQUE:  Routine CT imaging of the Brain without contrast   Post contrast imaging was performed after administration of iodinated contrast through the neck and brain  Post contrast axial 0 625 mm images timed to opacify the arterial system  3D rendering was performed on an independent workstation  MIP reconstructions performed  Coronal reconstructions were performed of the noncontrast portion of the brain  Radiation dose length product (DLP) for this visit:  1426 49 mGy-cm   This examination, like all CT scans performed in the [facility], was performed utilizing techniques to minimize radiation dose exposure, including the use of iterative reconstruction and automated exposure control  IV Contrast:  85 mL of iohexol (OMNIPAQUE) was administered intravenously without immediate adverse reaction  IMAGE QUALITY:   Diagnostic FINDINGS: NONCONTRAST BRAIN PARENCHYMA: Decreased attenuation is noted in periventricular and subcortical white matter demonstrating an appearance that is statistically most likely to represent mild microangiopathic change  No CT signs of acute infarction  No intracranial mass, mass effect or midline shift  No acute parenchymal hemorrhage  VENTRICLES AND EXTRA-AXIAL SPACES:  Normal for the patient's age   VISUALIZED ORBITS AND PARANASAL SINUSES:  Unremarkable  CERVICAL VASCULATURE AORTIC ARCH AND GREAT VESSELS:  Mild atherosclerotic disease of the arch, proximal great vessels and visualized subclavian vessels  No significant stenosis  RIGHT VERTEBRAL ARTERY CERVICAL SEGMENT:  Normal origin  The vessel is normal in caliber throughout the neck  LEFT VERTEBRAL ARTERY CERVICAL SEGMENT:  Normal origin  The vessel is normal in caliber throughout the neck  RIGHT EXTRACRANIAL CAROTID SEGMENT:  Mild atherosclerotic disease of the distal common carotid artery and proximal cervical internal carotid artery without significant stenosis compared to the more distal ICA  LEFT EXTRACRANIAL CAROTID SEGMENT:  Mild atherosclerotic disease of the distal common carotid artery and proximal cervical internal carotid artery without significant stenosis compared to the more distal ICA  NASCET criteria was used to determine the degree of internal carotid artery diameter stenosis  INTRACRANIAL VASCULATURE INTERNAL CAROTID ARTERIES: Calcifications of the cavernous and supraclinoid internal carotid arteries with mild narrowing in the left supraclinoid internal carotid artery  Normal ophthalmic artery origins  Normal ICA terminus  ANTERIOR CIRCULATION:  Symmetric A1 segments and anterior cerebral arteries with normal enhancement  Normal anterior communicating artery  MIDDLE CEREBRAL ARTERY CIRCULATION:  M1 segment and middle cerebral artery branches demonstrate normal enhancement bilaterally  DISTAL VERTEBRAL ARTERIES:  Normal distal vertebral arteries  Posterior inferior cerebellar artery origins are normal  Normal vertebral basilar junction  BASILAR ARTERY:  Basilar artery is normal in caliber  Normal superior cerebellar arteries  POSTERIOR CEREBRAL ARTERIES: Both posterior cerebral arteries arises from the basilar tip  Both arteries demonstrate normal enhancement  Normal posterior communicating arteries   DURAL VENOUS SINUSES:  Normal  NON VASCULAR ANATOMY BONY STRUCTURES:  No acute osseous abnormality  Multilevel degenerative changes  SOFT TISSUES OF THE NECK:  Normal  THORACIC INLET:  Unremarkable  Impression: No acute intracranial hemorrhage, midline shift, or mass effect  Mild atherosclerotic calcification of the carotid bifurcations  No hemodynamically significant stenosis of the arteries of the neck  No high-grade proximal stenosis of the visualized [place]  No significant intracranial arteriovenous malformation or aneurysm  Workstation performed: [id number]     XR chest 1 view portable    Result Date: 7/3/2021  Narrative: CHEST INDICATION:   syncope  COMPARISON:  12/02/2020 EXAM PERFORMED/VIEWS:  XR CHEST PORTABLE FINDINGS: Cardiomediastinal silhouette appears unremarkable  The lungs are clear  No pneumothorax or pleural effusion  Osseous structures appear within normal limits for patient age  Arthritic changes in both shoulders  Calcific tendinitis on the left  Impression: No acute cardiopulmonary disease   Workstation performed: [id number]

## 2021-07-26 NOTE — PATIENT INSTRUCTIONS
Colon/egd on 10/8/21 with Dr Nadiya Leon at [facility]  Miralax/dulcolax prep instructions given by Tamika BROWN

## 2021-08-31 ENCOUNTER — ANNUAL EXAM (OUTPATIENT)
Dept: OBGYN CLINIC | Facility: CLINIC | Age: 76
End: 2021-08-31
Payer: COMMERCIAL

## 2021-08-31 VITALS
DIASTOLIC BLOOD PRESSURE: 82 MMHG | HEIGHT: 67 IN | SYSTOLIC BLOOD PRESSURE: 122 MMHG | BODY MASS INDEX: 31.86 KG/M2 | WEIGHT: 203 LBS

## 2021-08-31 DIAGNOSIS — M85.80 OSTEOPENIA, UNSPECIFIED LOCATION: ICD-10-CM

## 2021-08-31 DIAGNOSIS — Z12.31 ENCOUNTER FOR SCREENING MAMMOGRAM FOR BREAST CANCER: ICD-10-CM

## 2021-08-31 DIAGNOSIS — N83.202 CYST OF LEFT OVARY: ICD-10-CM

## 2021-08-31 DIAGNOSIS — Z01.419 ENCOUNTER FOR ANNUAL ROUTINE GYNECOLOGICAL EXAMINATION: Primary | ICD-10-CM

## 2021-08-31 PROCEDURE — S0612 ANNUAL GYNECOLOGICAL EXAMINA: HCPCS | Performed by: OBSTETRICS & GYNECOLOGY

## 2021-08-31 NOTE — PROGRESS NOTES
Assessment/Plan:    Pap smear deferred due to low risk status  Encouraged self-breast examination as well as calcium supplementation  Continue annual mammogram   Reviewed colon cancer screening, scheduled for colonoscopy/endoscopy in October  She will be following up with General surgery regarding recurrent incisional hernia  Recommend pelvic ultrasound follow-up left small simple ovarian cyst   Implications reviewed  All questions answered  I will notify her of these results via telephone  Return to office in 1 year or p r n  No problem-specific Assessment & Plan notes found for this encounter  Diagnoses and all orders for this visit:    Encounter for annual routine gynecological examination    Encounter for screening mammogram for breast cancer  -     Mammo screening bilateral w 3d & cad; Future    Osteopenia, unspecified location  -     DXA bone density spine hip and pelvis; Future    Cyst of left ovary  -     US pelvis complete w transvaginal; Future          Subjective:      Patient ID: Zev Patton is a 76 y o  female  HPI      this is a very pleasant 51-year-old female  ( x2) presents for gyn exam   Patient went through menopause at age 55  She has never been on hormone replacement therapy  Patient underwent examine anesthesia, D and C/ MyoSure/resection of large endometrial polyp approximately 1 year ago revealing benign tissue  She has had no further vaginal bleeding or spotting  Patient has not been sexually active in over 12 years  She is  approximately 20 years  She has been having episodes of fecal incontinence  She is scheduled to see Gastroenterology with endoscopy and colonoscopy next month  There has been no changes in bladder function  She does follow up with her family physician on a regular basis      The following portions of the patient's history were reviewed and updated as appropriate: allergies, current medications, past family history, past medical history, past social history, past surgical history and problem list     Review of Systems   Constitutional: Negative for fatigue, fever and unexpected weight change  Respiratory: Negative for cough, chest tightness, shortness of breath and wheezing  Cardiovascular: Negative  Negative for chest pain and palpitations  Gastrointestinal: Negative  Negative for abdominal distention, abdominal pain, blood in stool, constipation, diarrhea, nausea and vomiting  Genitourinary: Negative  Negative for difficulty urinating, dyspareunia, dysuria, flank pain, frequency, genital sores, hematuria, pelvic pain, urgency, vaginal bleeding, vaginal discharge and vaginal pain  Skin: Negative for rash  Objective:      /82   Ht 5' 7" (1 702 m)   Wt 92 1 kg (203 lb)   BMI 31 79 kg/m²          Physical Exam  Constitutional:       Appearance: Normal appearance  She is well-developed  Cardiovascular:      Rate and Rhythm: Normal rate and regular rhythm  Pulmonary:      Effort: Pulmonary effort is normal       Breath sounds: Normal breath sounds  Chest:      Breasts:         Right: No inverted nipple, mass, nipple discharge, skin change or tenderness  Left: No inverted nipple, mass, nipple discharge, skin change or tenderness  Abdominal:      General: Bowel sounds are normal  There is no distension  Palpations: Abdomen is soft  Tenderness: There is no abdominal tenderness  There is no guarding or rebound  Genitourinary:     Labia:         Right: No rash, tenderness or lesion  Left: No rash, tenderness or lesion  Vagina: Normal  No signs of injury  No vaginal discharge or tenderness  Cervix: No cervical motion tenderness, discharge, friability, lesion, erythema or cervical bleeding  Uterus: Not enlarged and not tender  Adnexa:         Right: No mass, tenderness or fullness  Left: No mass, tenderness or fullness          Comments: External genitalia is within normal limits  The vagina is evident of estrogen deficiency  Cervix is small  She has mild pelvic floor prolapse  Rectovaginal exam is confirmatory  Neurological:      Mental Status: She is alert and oriented to person, place, and time     Psychiatric:         Behavior: Behavior normal

## 2021-09-21 ENCOUNTER — HOSPITAL ENCOUNTER (OUTPATIENT)
Dept: RADIOLOGY | Facility: HOSPITAL | Age: 76
Discharge: HOME | End: 2021-09-21
Attending: OBSTETRICS & GYNECOLOGY
Payer: COMMERCIAL

## 2021-09-21 DIAGNOSIS — N83.202 CYST OF LEFT OVARY: ICD-10-CM

## 2021-09-21 PROCEDURE — 76856 US EXAM PELVIC COMPLETE: CPT

## 2021-09-21 PROCEDURE — 76830 TRANSVAGINAL US NON-OB: CPT

## 2021-09-22 ENCOUNTER — TELEPHONE (OUTPATIENT)
Dept: OBGYN CLINIC | Facility: CLINIC | Age: 76
End: 2021-09-22

## 2021-10-05 ENCOUNTER — TELEPHONE (OUTPATIENT)
Dept: OBGYN CLINIC | Facility: HOSPITAL | Age: 76
End: 2021-10-05

## 2021-10-05 ENCOUNTER — NURSE TRIAGE (OUTPATIENT)
Dept: OTHER | Facility: OTHER | Age: 76
End: 2021-10-05

## 2021-10-06 ENCOUNTER — TELEPHONE (OUTPATIENT)
Dept: GASTROENTEROLOGY | Facility: CLINIC | Age: 76
End: 2021-10-06

## 2021-10-19 ENCOUNTER — PROCEDURE VISIT (OUTPATIENT)
Dept: OBGYN CLINIC | Facility: CLINIC | Age: 76
End: 2021-10-19
Payer: COMMERCIAL

## 2021-10-19 VITALS
BODY MASS INDEX: 30.61 KG/M2 | HEIGHT: 67 IN | WEIGHT: 195 LBS | SYSTOLIC BLOOD PRESSURE: 138 MMHG | DIASTOLIC BLOOD PRESSURE: 84 MMHG

## 2021-10-19 DIAGNOSIS — R93.89 THICKENED ENDOMETRIUM: Primary | ICD-10-CM

## 2021-10-19 PROCEDURE — 88305 TISSUE EXAM BY PATHOLOGIST: CPT | Performed by: PATHOLOGY

## 2021-10-19 PROCEDURE — 58100 BIOPSY OF UTERUS LINING: CPT | Performed by: OBSTETRICS & GYNECOLOGY

## 2021-10-26 ENCOUNTER — TELEPHONE (OUTPATIENT)
Dept: OBGYN CLINIC | Facility: CLINIC | Age: 76
End: 2021-10-26

## 2021-11-04 ENCOUNTER — PREP FOR PROCEDURE (OUTPATIENT)
Dept: OBGYN CLINIC | Facility: CLINIC | Age: 76
End: 2021-11-04

## 2021-11-04 DIAGNOSIS — R93.89 THICKENED ENDOMETRIUM: Primary | ICD-10-CM

## 2021-12-07 ENCOUNTER — PROCEDURE VISIT (OUTPATIENT)
Dept: OBGYN CLINIC | Facility: HOSPITAL | Age: 76
End: 2021-12-07
Payer: COMMERCIAL

## 2021-12-07 ENCOUNTER — HOSPITAL ENCOUNTER (OUTPATIENT)
Dept: RADIOLOGY | Facility: HOSPITAL | Age: 76
Discharge: HOME | End: 2021-12-07
Payer: COMMERCIAL

## 2021-12-07 VITALS
DIASTOLIC BLOOD PRESSURE: 82 MMHG | HEART RATE: 75 BPM | SYSTOLIC BLOOD PRESSURE: 162 MMHG | WEIGHT: 196 LBS | HEIGHT: 67 IN | BODY MASS INDEX: 30.76 KG/M2

## 2021-12-07 DIAGNOSIS — M19.012 PRIMARY OSTEOARTHRITIS OF LEFT SHOULDER: ICD-10-CM

## 2021-12-07 DIAGNOSIS — M25.561 CHRONIC PAIN OF BOTH KNEES: ICD-10-CM

## 2021-12-07 DIAGNOSIS — M25.512 CHRONIC LEFT SHOULDER PAIN: ICD-10-CM

## 2021-12-07 DIAGNOSIS — M25.562 CHRONIC PAIN OF BOTH KNEES: ICD-10-CM

## 2021-12-07 DIAGNOSIS — M17.0 PRIMARY OSTEOARTHRITIS OF BOTH KNEES: Primary | ICD-10-CM

## 2021-12-07 DIAGNOSIS — G89.29 CHRONIC LEFT SHOULDER PAIN: ICD-10-CM

## 2021-12-07 DIAGNOSIS — G89.29 CHRONIC PAIN OF BOTH KNEES: ICD-10-CM

## 2021-12-07 PROCEDURE — 20610 DRAIN/INJ JOINT/BURSA W/O US: CPT | Performed by: ORTHOPAEDIC SURGERY

## 2021-12-07 PROCEDURE — 20605 DRAIN/INJ JOINT/BURSA W/O US: CPT | Performed by: ORTHOPAEDIC SURGERY

## 2021-12-07 PROCEDURE — 73030 X-RAY EXAM OF SHOULDER: CPT

## 2021-12-07 PROCEDURE — 99213 OFFICE O/P EST LOW 20 MIN: CPT | Performed by: ORTHOPAEDIC SURGERY

## 2021-12-07 RX ORDER — BUPIVACAINE HYDROCHLORIDE 2.5 MG/ML
2 INJECTION, SOLUTION INFILTRATION; PERINEURAL
Status: COMPLETED | OUTPATIENT
Start: 2021-12-07 | End: 2021-12-07

## 2021-12-07 RX ORDER — HYALURONATE SODIUM 10 MG/ML
20 SYRINGE (ML) INTRAARTICULAR
Status: COMPLETED | OUTPATIENT
Start: 2021-12-07 | End: 2021-12-07

## 2021-12-07 RX ORDER — BETAMETHASONE SODIUM PHOSPHATE AND BETAMETHASONE ACETATE 3; 3 MG/ML; MG/ML
6 INJECTION, SUSPENSION INTRA-ARTICULAR; INTRALESIONAL; INTRAMUSCULAR; SOFT TISSUE
Status: COMPLETED | OUTPATIENT
Start: 2021-12-07 | End: 2021-12-07

## 2021-12-07 RX ORDER — LIDOCAINE HYDROCHLORIDE 10 MG/ML
2 INJECTION, SOLUTION INFILTRATION; PERINEURAL
Status: COMPLETED | OUTPATIENT
Start: 2021-12-07 | End: 2021-12-07

## 2021-12-07 RX ADMIN — LIDOCAINE HYDROCHLORIDE 2 ML: 10 INJECTION, SOLUTION INFILTRATION; PERINEURAL at 15:42

## 2021-12-07 RX ADMIN — BETAMETHASONE SODIUM PHOSPHATE AND BETAMETHASONE ACETATE 6 MG: 3; 3 INJECTION, SUSPENSION INTRA-ARTICULAR; INTRALESIONAL; INTRAMUSCULAR; SOFT TISSUE at 15:42

## 2021-12-07 RX ADMIN — Medication 20 MG: at 15:42

## 2021-12-07 RX ADMIN — BUPIVACAINE HYDROCHLORIDE 2 ML: 2.5 INJECTION, SOLUTION INFILTRATION; PERINEURAL at 15:42

## 2021-12-14 ENCOUNTER — PROCEDURE VISIT (OUTPATIENT)
Dept: OBGYN CLINIC | Facility: HOSPITAL | Age: 76
End: 2021-12-14
Payer: COMMERCIAL

## 2021-12-14 ENCOUNTER — TELEPHONE (OUTPATIENT)
Dept: GASTROENTEROLOGY | Facility: HOSPITAL | Age: 76
End: 2021-12-14

## 2021-12-14 VITALS
DIASTOLIC BLOOD PRESSURE: 82 MMHG | HEART RATE: 74 BPM | BODY MASS INDEX: 30.13 KG/M2 | HEIGHT: 67 IN | SYSTOLIC BLOOD PRESSURE: 164 MMHG | WEIGHT: 192 LBS

## 2021-12-14 DIAGNOSIS — M25.561 CHRONIC PAIN OF BOTH KNEES: ICD-10-CM

## 2021-12-14 DIAGNOSIS — M25.562 CHRONIC PAIN OF BOTH KNEES: ICD-10-CM

## 2021-12-14 DIAGNOSIS — M17.0 PRIMARY OSTEOARTHRITIS OF BOTH KNEES: Primary | ICD-10-CM

## 2021-12-14 DIAGNOSIS — G89.29 CHRONIC PAIN OF BOTH KNEES: ICD-10-CM

## 2021-12-14 PROCEDURE — 20610 DRAIN/INJ JOINT/BURSA W/O US: CPT | Performed by: ORTHOPAEDIC SURGERY

## 2021-12-14 RX ORDER — HYALURONATE SODIUM 10 MG/ML
20 SYRINGE (ML) INTRAARTICULAR
Status: COMPLETED | OUTPATIENT
Start: 2021-12-14 | End: 2021-12-14

## 2021-12-14 RX ADMIN — Medication 20 MG: at 15:03

## 2021-12-15 ENCOUNTER — HOSPITAL ENCOUNTER (OUTPATIENT)
Dept: GASTROENTEROLOGY | Facility: HOSPITAL | Age: 76
Setting detail: OUTPATIENT SURGERY
Discharge: HOME | End: 2021-12-15
Attending: INTERNAL MEDICINE
Payer: COMMERCIAL

## 2021-12-15 ENCOUNTER — ANESTHESIA (OUTPATIENT)
Dept: GASTROENTEROLOGY | Facility: HOSPITAL | Age: 76
End: 2021-12-15

## 2021-12-15 ENCOUNTER — ANESTHESIA EVENT (OUTPATIENT)
Dept: GASTROENTEROLOGY | Facility: HOSPITAL | Age: 76
End: 2021-12-15

## 2021-12-15 VITALS
RESPIRATION RATE: 18 BRPM | TEMPERATURE: 97.8 F | OXYGEN SATURATION: 100 % | SYSTOLIC BLOOD PRESSURE: 127 MMHG | HEART RATE: 55 BPM | DIASTOLIC BLOOD PRESSURE: 63 MMHG

## 2021-12-15 DIAGNOSIS — K21.9 GASTROESOPHAGEAL REFLUX DISEASE, UNSPECIFIED WHETHER ESOPHAGITIS PRESENT: ICD-10-CM

## 2021-12-15 DIAGNOSIS — Z12.11 SCREENING FOR COLON CANCER: ICD-10-CM

## 2021-12-15 PROCEDURE — 43239 EGD BIOPSY SINGLE/MULTIPLE: CPT | Performed by: INTERNAL MEDICINE

## 2021-12-15 PROCEDURE — 88305 TISSUE EXAM BY PATHOLOGIST: CPT | Performed by: PATHOLOGY

## 2021-12-15 PROCEDURE — G0121 COLON CA SCRN NOT HI RSK IND: HCPCS | Performed by: INTERNAL MEDICINE

## 2021-12-15 RX ORDER — SODIUM CHLORIDE 9 MG/ML
125 INJECTION, SOLUTION INTRAVENOUS CONTINUOUS
Status: DISCONTINUED | OUTPATIENT
Start: 2021-12-15 | End: 2021-12-19 | Stop reason: HOSPADM

## 2021-12-15 RX ORDER — LIDOCAINE HYDROCHLORIDE 10 MG/ML
INJECTION, SOLUTION EPIDURAL; INFILTRATION; INTRACAUDAL; PERINEURAL AS NEEDED
Status: DISCONTINUED | OUTPATIENT
Start: 2021-12-15 | End: 2021-12-15

## 2021-12-15 RX ORDER — PROPOFOL 10 MG/ML
INJECTION, EMULSION INTRAVENOUS CONTINUOUS PRN
Status: DISCONTINUED | OUTPATIENT
Start: 2021-12-15 | End: 2021-12-15

## 2021-12-15 RX ORDER — PROPOFOL 10 MG/ML
INJECTION, EMULSION INTRAVENOUS AS NEEDED
Status: DISCONTINUED | OUTPATIENT
Start: 2021-12-15 | End: 2021-12-15

## 2021-12-15 RX ORDER — SODIUM CHLORIDE 9 MG/ML
INJECTION, SOLUTION INTRAVENOUS CONTINUOUS PRN
Status: DISCONTINUED | OUTPATIENT
Start: 2021-12-15 | End: 2021-12-15

## 2021-12-15 RX ADMIN — LIDOCAINE HYDROCHLORIDE 50 MG: 10 INJECTION, SOLUTION EPIDURAL; INFILTRATION; INTRACAUDAL; PERINEURAL at 08:08

## 2021-12-15 RX ADMIN — PROPOFOL 50 MG: 10 INJECTION, EMULSION INTRAVENOUS at 08:08

## 2021-12-15 RX ADMIN — SODIUM CHLORIDE 125 ML/HR: 0.9 INJECTION, SOLUTION INTRAVENOUS at 07:33

## 2021-12-15 RX ADMIN — PROPOFOL 30 MG: 10 INJECTION, EMULSION INTRAVENOUS at 08:22

## 2021-12-15 RX ADMIN — SODIUM CHLORIDE: 0.9 INJECTION, SOLUTION INTRAVENOUS at 08:06

## 2021-12-15 RX ADMIN — PROPOFOL 50 MG: 10 INJECTION, EMULSION INTRAVENOUS at 08:09

## 2021-12-15 RX ADMIN — LIDOCAINE HYDROCHLORIDE 50 MG: 10 INJECTION, SOLUTION EPIDURAL; INFILTRATION; INTRACAUDAL; PERINEURAL at 08:10

## 2021-12-15 RX ADMIN — PROPOFOL 120 MCG/KG/MIN: 10 INJECTION, EMULSION INTRAVENOUS at 08:09

## 2021-12-21 ENCOUNTER — PROCEDURE VISIT (OUTPATIENT)
Dept: OBGYN CLINIC | Facility: HOSPITAL | Age: 76
End: 2021-12-21
Payer: COMMERCIAL

## 2021-12-21 VITALS
HEIGHT: 67 IN | BODY MASS INDEX: 30.07 KG/M2 | HEART RATE: 94 BPM | DIASTOLIC BLOOD PRESSURE: 84 MMHG | SYSTOLIC BLOOD PRESSURE: 128 MMHG

## 2021-12-21 DIAGNOSIS — M25.562 CHRONIC PAIN OF BOTH KNEES: ICD-10-CM

## 2021-12-21 DIAGNOSIS — M17.0 PRIMARY OSTEOARTHRITIS OF BOTH KNEES: Primary | ICD-10-CM

## 2021-12-21 DIAGNOSIS — M25.561 CHRONIC PAIN OF BOTH KNEES: ICD-10-CM

## 2021-12-21 DIAGNOSIS — G89.29 CHRONIC PAIN OF BOTH KNEES: ICD-10-CM

## 2021-12-21 PROCEDURE — 20610 DRAIN/INJ JOINT/BURSA W/O US: CPT | Performed by: ORTHOPAEDIC SURGERY

## 2021-12-21 RX ORDER — ROSUVASTATIN CALCIUM 5 MG/1
5 TABLET, COATED ORAL EVERY OTHER DAY
COMMUNITY
Start: 2021-12-17

## 2021-12-21 RX ORDER — HYALURONATE SODIUM 10 MG/ML
20 SYRINGE (ML) INTRAARTICULAR
Status: COMPLETED | OUTPATIENT
Start: 2021-12-21 | End: 2021-12-21

## 2021-12-21 RX ADMIN — Medication 20 MG: at 15:15

## 2021-12-28 ENCOUNTER — HOSPITAL ENCOUNTER (OUTPATIENT)
Dept: RADIOLOGY | Age: 76
Discharge: HOME | End: 2021-12-28
Payer: COMMERCIAL

## 2021-12-28 VITALS — BODY MASS INDEX: 30.45 KG/M2 | WEIGHT: 194 LBS | HEIGHT: 67 IN

## 2021-12-28 DIAGNOSIS — Z12.31 ENCOUNTER FOR SCREENING MAMMOGRAM FOR BREAST CANCER: ICD-10-CM

## 2021-12-28 PROCEDURE — 77067 SCR MAMMO BI INCL CAD: CPT

## 2021-12-28 PROCEDURE — 77063 BREAST TOMOSYNTHESIS BI: CPT

## 2022-01-03 ENCOUNTER — OFFICE VISIT (OUTPATIENT)
Dept: OBGYN CLINIC | Facility: CLINIC | Age: 77
End: 2022-01-03

## 2022-01-03 VITALS
WEIGHT: 195 LBS | BODY MASS INDEX: 30.61 KG/M2 | HEIGHT: 67 IN | DIASTOLIC BLOOD PRESSURE: 80 MMHG | SYSTOLIC BLOOD PRESSURE: 122 MMHG

## 2022-01-03 DIAGNOSIS — R93.89 ENDOMETRIAL STRIPE INCREASED: Primary | ICD-10-CM

## 2022-01-03 PROCEDURE — PREOP: Performed by: OBSTETRICS & GYNECOLOGY

## 2022-01-03 NOTE — PROGRESS NOTES
Assessment/Plan:  Patient was consented for examine anesthesia, hysteroscopy with resection of intrauterine lesion (with Symphion), D and C  Reviewed risks and benefits including leaving residual disease, injury to adjacent structures requiring further surgery  Medical clearance was obtained by her primary care physician  She is also instructed to discontinue her aspirin 7 days prior to surgery date  She will scheduled her 1 week postop visit  Questions answered  No problem-specific Assessment & Plan notes found for this encounter  There are no diagnoses linked to this encounter  Subjective:      Patient ID: Armando Chu is a 68 y o  female  HPI   This is a very pleasant 59-year-old female  ( x2) presents for preoperative counseling  She is scheduled for examine anesthesia, hysteroscopy, resection of intrauterine lesion, D and C on 2022  She underwent the above procedure approximately 1 year ago after complaining of postmenopausal spotting  Hysteroscopic findings were consistent with large endometrial polyp, benign  She also had an incidental ovarian simple cyst   She underwent a follow-up ultrasound which again had confirmed a thickened endometrial stripe  Patient has had no further bleeding, spotting, pelvic pain  More recent Pelvic ultrasound had revealed endometrial stripe 16 mm, hypervascular, cystic areas, stable small left simple adnexal cyst less than 2 cm (unchanged from prior ultrasound)  The following portions of the patient's history were reviewed and updated as appropriate: allergies, current medications, past family history, past medical history, past social history, past surgical history and problem list     Review of Systems   Constitutional: Negative for fatigue, fever and unexpected weight change  Respiratory: Negative for cough, chest tightness, shortness of breath and wheezing  Cardiovascular: Negative    Negative for chest pain and palpitations  Gastrointestinal: Negative  Negative for abdominal distention, abdominal pain, blood in stool, constipation, diarrhea, nausea and vomiting  Genitourinary: Negative  Negative for difficulty urinating, dyspareunia, dysuria, flank pain, frequency, genital sores, hematuria, pelvic pain, urgency, vaginal bleeding, vaginal discharge and vaginal pain  Skin: Negative for rash  Objective:      /80   Ht 5' 7" (1 702 m)   Wt 88 5 kg (195 lb)   BMI 30 54 kg/m²          Physical Exam  Constitutional:       Appearance: Normal appearance  HENT:      Head: Normocephalic and atraumatic  Cardiovascular:      Rate and Rhythm: Normal rate and regular rhythm  Pulmonary:      Effort: Pulmonary effort is normal       Breath sounds: Normal breath sounds  Abdominal:      General: Bowel sounds are normal  There is no distension  Palpations: Abdomen is soft  Tenderness: There is no abdominal tenderness  There is no guarding or rebound  Genitourinary:     Labia:         Right: No rash, tenderness or lesion  Left: No rash, tenderness or lesion  Vagina: No signs of injury  No vaginal discharge, tenderness or lesions  Cervix: No cervical motion tenderness, discharge, friability, lesion, erythema or cervical bleeding  Uterus: Not enlarged and not tender  Adnexa:         Right: No mass, tenderness or fullness  Left: No mass, tenderness or fullness  Neurological:      Mental Status: She is alert and oriented to person, place, and time     Psychiatric:         Behavior: Behavior normal        extremities:  Present x4

## 2022-01-07 ENCOUNTER — ANESTHESIA EVENT (OUTPATIENT)
Dept: PERIOP | Facility: HOSPITAL | Age: 77
End: 2022-01-07
Payer: COMMERCIAL

## 2022-01-11 PROCEDURE — NC001 PR NO CHARGE: Performed by: OBSTETRICS & GYNECOLOGY

## 2022-01-11 NOTE — H&P
H&P Exam - Gynecology   Kiah Espinoza 68 y o  female MRN: [id number]  Unit/Bed#:  Encounter: [id number]    Assessment/Plan     Plan:    Patient was consented for examine anesthesia, hysteroscopy with resection of intrauterine lesion (with Symphion), D and C  Reviewed risks and benefits including leaving residual disease, injury to adjacent structures requiring further surgery  Medical clearance was obtained by her primary care physician  She is also instructed to discontinue her aspirin 7 days prior to surgery date  She will scheduled her 1 week postop visit  Questions answered  Assessment:  Endometrial Hypertrophy    History of Present Illness     HPI:  Kiah Espinoza is a 68 y o  female  ( x2) presents for examine anesthesia, hysteroscopy, resection of intrauterine lesion, D and C on 2022  She underwent the above procedure approximately 1 year ago after complaining of postmenopausal spotting  Hysteroscopic findings were consistent with large endometrial polyp, benign  She also had an incidental ovarian simple cyst   She underwent a follow-up ultrasound which again had confirmed a thickened endometrial stripe  Patient has had no further bleeding, spotting, pelvic pain      More recent Pelvic ultrasound had revealed endometrial stripe 16 mm, hypervascular, cystic areas, stable small left simple adnexal cyst less than 2 cm (unchanged from prior ultrasound)       Review of Systems   Constitutional: Negative for fatigue, fever and unexpected weight change  Respiratory: Negative for cough, chest tightness, shortness of breath and wheezing  Cardiovascular: Negative  Negative for chest pain and palpitations  Gastrointestinal: Negative  Negative for abdominal distention, abdominal pain, blood in stool, constipation, diarrhea, nausea and vomiting  Genitourinary: Negative    Negative for difficulty urinating, dyspareunia, dysuria, flank pain, frequency, genital sores, hematuria, pelvic pain, urgency, vaginal bleeding, vaginal discharge and vaginal pain  Skin: Negative for rash  Historical Information   Past Medical History:   Diagnosis Date    Abnormal Pap smear of cervix     Basal cell carcinoma (BCC) in situ of skin     on back    GERD (gastroesophageal reflux disease)     Hyperlipidemia     Hypertension     Incisional hernia     Uterine polyp      Past Surgical History:   Procedure Laterality Date    COLOSTOMY  2011    partial, end, distal segment closure    ENTEROSTOMY CLOSURE      with anastomosis    HERNIA REPAIR  2012/2013/2014    HERNIA REPAIR      Pt states with a strangled hernia    HIP SURGERY Right 2010    HIP SURGERY Left 2011    INCISIONAL HERNIA REPAIR      Incarcerated    WA DILATION OF VAGINA W ANESTH N/A 12/14/2020    Procedure: EXAM UNDER ANESTHESIA (EUA);   Surgeon: Mickie Singh DO;  [facility];  Service: Gynecology    WA HYSTEROSCOPY,RMV MYOMA N/A 12/14/2020    Procedure: endometrial polypectomy;  Surgeon: Mickie Singh DO;  [facility];  Service: Gynecology    WA HYSTEROSCOPY,W/ENDO BX N/A 12/14/2020    Procedure: DILATATION AND CURETTAGE (D&C) WITH HYSTEROSCOPY;  Surgeon: Mickie Singh DO;  [facility];  Service: Gynecology    TOTAL HIP ARTHROPLASTY Bilateral     2011, 2012     OB/GYN History: as above  Family History   Problem Relation Age of Onset    Heart disease Mother     Coronary artery disease Mother     Stroke Mother     Hypertension Mother     Heart failure Father     Coronary artery disease Father     Colon cancer Maternal Uncle 61    No Known Problems Maternal Grandmother     No Known Problems Maternal Grandfather     No Known Problems Paternal Grandmother     No Known Problems Paternal Grandfather     No Known Problems Son     No Known Problems Son      Social History   Social History     Substance and Sexual Activity   Alcohol Use Yes    Comment: weekly, occasional use Social History     Substance and Sexual Activity   Drug Use Never     Social History     Tobacco Use   Smoking Status Former Smoker   Smokeless Tobacco Never Used   Tobacco Comment    Pt states with hx 40 years ago     E-Cigarette/Vaping    E-Cigarette Use Never User      E-Cigarette/Vaping Substances    Nicotine No     THC No     CBD No     Flavoring No     Other No        Meds/Allergies   all current active meds have been reviewed  Allergies   Allergen Reactions    Iodides Lip Swelling    Iodine - Food Allergy Lip Swelling    Other        Objective   Vitals: not currently breastfeeding  No intake or output data in the 24 hours ending 01/11/22 0727    Invasive Devices: Invasive Devices  Report    None                 Physical Exam  Constitutional:       Appearance: Normal appearance  HENT:      Head: Normocephalic and atraumatic  Cardiovascular:      Rate and Rhythm: Normal rate and regular rhythm  Pulmonary:      Effort: Pulmonary effort is normal       Breath sounds: Normal breath sounds  Abdominal:      General: Bowel sounds are normal       Palpations: Abdomen is soft  There is no mass  Tenderness: There is no rebound  Hernia: No hernia is present  Genitourinary:     Labia:         Right: No rash, tenderness or lesion  Left: No rash, tenderness or lesion  Vagina: No signs of injury  No vaginal discharge or tenderness  Cervix: No cervical motion tenderness, discharge, friability, lesion, erythema or cervical bleeding  Uterus: Not enlarged and not tender  Adnexa:         Right: No mass, tenderness or fullness  Left: No mass, tenderness or fullness  Neurological:      Mental Status: She is alert  Extremities:present x 4    Lab Results: I have personally reviewed pertinent reports  Imaging: I have personally reviewed pertinent reports  EKG, Pathology, and Other Studies: I have personally reviewed pertinent reports        Code Status: No Order  Advance Directive and Living Will:      Power of :    POLST:      Counseling / Coordination of Care  Total floor / unit time spent today 30 minutes  Greater than 50% of total time was spent with the patient and / or family counseling and / or coordination of care  A description of the counseling / coordination of care

## 2022-01-12 NOTE — ANESTHESIA PREPROCEDURE EVALUATION
Procedure:  EXAM UNDER ANESTHESIA (EUA) W/ D&C HYSTEROSCOPY MYSOSURE ( (N/A Vagina )  DILATATION AND CURETTAGE (D&C) WITH HYSTEROSCOPY (N/A Uterus)  HYSTEROSCOPY WITH  RESECTION UTERINE TUMOR/FIBROID (N/A Uterus)    Relevant Problems   ANESTHESIA (within normal limits)      CARDIO   (+) HTN (hypertension)   (+) Hyperlipidemia      ENDO (within normal limits)      GI/HEPATIC   (+) Gastroesophageal reflux disease      /RENAL (within normal limits)      HEMATOLOGY (within normal limits)      MUSCULOSKELETAL   (+) Primary osteoarthritis of left knee   (+) Primary osteoarthritis of right knee      NEURO/PSYCH (within normal limits)      PULMONARY (within normal limits)      Other   (+) Basal cell carcinoma (BCC) in situ of skin   (+) Obesity      EKG 7/2/2021:  Normal sinus rhythm  Normal ECG  When compared with ECG of 05-JUL-2016 10:25,  Questionable change in QRS axis    Nuclear Stress 7/2016:  -  Stress results: Duration of exercise was 5 min and 59 sec  Target heart rate  was achieved  There was resting hypertension with an appropriate blood pressure  response to stress  There was no chest pain during stress  -  ECG conclusions:  Arrhythmia during stress: isolated atrial premature beats, isolated premature  ventricular beats, and ventricular premature beats, triplets   -  Perfusion imaging: There were no perfusion defects   -  Gated SPECT: The calculated left ventricular ejection fraction was 75 %  Left ventricular ejection fraction was within normal limits by visual estimate  There was no left ventricular regional abnormality      IMPRESSIONS: Normal study after maximal exercise without reproduction of  symptoms  Myocardial perfusion imaging was normal at rest and with stress   Left  ventricular systolic function was normal     Lab Results   Component Value Date    WBC 7 99 07/02/2021    HGB 14 7 07/02/2021    HCT 44 4 07/02/2021    MCV 98 07/02/2021     07/02/2021     Lab Results   Component Value Date SODIUM 138 07/02/2021    K 3 9 07/02/2021     07/02/2021    CO2 27 07/02/2021    BUN 15 07/02/2021    CREATININE 0 81 07/02/2021    GLUC 95 07/02/2021    CALCIUM 9 3 07/02/2021     No results found for: INR, PROTIME  Lab Results   Component Value Date    HGBA1C 5 3 04/19/2019          Physical Exam    Airway    Mallampati score: I  TM Distance: >3 FB  Neck ROM: full     Dental       Cardiovascular  Cardiovascular exam normal    Pulmonary  Pulmonary exam normal     Other Findings        Anesthesia Plan  ASA Score- 2     Anesthesia Type- general with ASA Monitors  Additional Monitors:   Airway Plan: LMA  Plan Factors-Exercise tolerance (METS): >4 METS  Chart reviewed  EKG reviewed  Existing labs reviewed  Patient summary reviewed  Induction- intravenous  Postoperative Plan-     Informed Consent- Anesthetic plan and risks discussed with patient  I personally reviewed this patient with the CRNA  Discussed and agreed on the Anesthesia Plan with the CRNA  Chacorta Cooley

## 2022-01-13 ENCOUNTER — ANESTHESIA (OUTPATIENT)
Dept: PERIOP | Facility: HOSPITAL | Age: 77
End: 2022-01-13
Payer: COMMERCIAL

## 2022-01-13 ENCOUNTER — HOSPITAL ENCOUNTER (OUTPATIENT)
Facility: HOSPITAL | Age: 77
Setting detail: OUTPATIENT SURGERY
Discharge: HOME | End: 2022-01-13
Attending: OBSTETRICS & GYNECOLOGY | Admitting: OBSTETRICS & GYNECOLOGY
Payer: COMMERCIAL

## 2022-01-13 VITALS
WEIGHT: 195 LBS | DIASTOLIC BLOOD PRESSURE: 73 MMHG | BODY MASS INDEX: 30.61 KG/M2 | HEIGHT: 67 IN | OXYGEN SATURATION: 96 % | RESPIRATION RATE: 16 BRPM | SYSTOLIC BLOOD PRESSURE: 127 MMHG | HEART RATE: 66 BPM | TEMPERATURE: 97.1 F

## 2022-01-13 DIAGNOSIS — R93.89 THICKENED ENDOMETRIUM: ICD-10-CM

## 2022-01-13 PROBLEM — E66.9 OBESITY: Status: ACTIVE | Noted: 2022-01-13

## 2022-01-13 PROCEDURE — 58558 HYSTEROSCOPY BIOPSY: CPT | Performed by: OBSTETRICS & GYNECOLOGY

## 2022-01-13 PROCEDURE — 88305 TISSUE EXAM BY PATHOLOGIST: CPT | Performed by: PATHOLOGY

## 2022-01-13 RX ORDER — DEXAMETHASONE SODIUM PHOSPHATE 10 MG/ML
INJECTION, SOLUTION INTRAMUSCULAR; INTRAVENOUS AS NEEDED
Status: DISCONTINUED | OUTPATIENT
Start: 2022-01-13 | End: 2022-01-13

## 2022-01-13 RX ORDER — ACETAMINOPHEN 325 MG/1
650 TABLET ORAL ONCE
Status: DISCONTINUED | OUTPATIENT
Start: 2022-01-13 | End: 2022-01-13 | Stop reason: HOSPADM

## 2022-01-13 RX ORDER — SODIUM CHLORIDE, SODIUM LACTATE, POTASSIUM CHLORIDE, CALCIUM CHLORIDE 600; 310; 30; 20 MG/100ML; MG/100ML; MG/100ML; MG/100ML
INJECTION, SOLUTION INTRAVENOUS CONTINUOUS PRN
Status: DISCONTINUED | OUTPATIENT
Start: 2022-01-13 | End: 2022-01-13

## 2022-01-13 RX ORDER — ONDANSETRON 2 MG/ML
4 INJECTION INTRAMUSCULAR; INTRAVENOUS EVERY 6 HOURS PRN
Status: DISCONTINUED | OUTPATIENT
Start: 2022-01-13 | End: 2022-01-13 | Stop reason: HOSPADM

## 2022-01-13 RX ORDER — MAGNESIUM HYDROXIDE 1200 MG/15ML
LIQUID ORAL AS NEEDED
Status: DISCONTINUED | OUTPATIENT
Start: 2022-01-13 | End: 2022-01-13 | Stop reason: HOSPADM

## 2022-01-13 RX ORDER — PROPOFOL 10 MG/ML
INJECTION, EMULSION INTRAVENOUS AS NEEDED
Status: DISCONTINUED | OUTPATIENT
Start: 2022-01-13 | End: 2022-01-13

## 2022-01-13 RX ORDER — KETOROLAC TROMETHAMINE 30 MG/ML
INJECTION, SOLUTION INTRAMUSCULAR; INTRAVENOUS AS NEEDED
Status: DISCONTINUED | OUTPATIENT
Start: 2022-01-13 | End: 2022-01-13

## 2022-01-13 RX ORDER — ONDANSETRON 2 MG/ML
INJECTION INTRAMUSCULAR; INTRAVENOUS AS NEEDED
Status: DISCONTINUED | OUTPATIENT
Start: 2022-01-13 | End: 2022-01-13

## 2022-01-13 RX ORDER — EPHEDRINE SULFATE 50 MG/ML
INJECTION INTRAVENOUS AS NEEDED
Status: DISCONTINUED | OUTPATIENT
Start: 2022-01-13 | End: 2022-01-13

## 2022-01-13 RX ORDER — FENTANYL CITRATE/PF 50 MCG/ML
50 SYRINGE (ML) INJECTION
Status: DISCONTINUED | OUTPATIENT
Start: 2022-01-13 | End: 2022-01-13 | Stop reason: HOSPADM

## 2022-01-13 RX ORDER — IBUPROFEN 600 MG/1
600 TABLET ORAL EVERY 6 HOURS PRN
Status: DISCONTINUED | OUTPATIENT
Start: 2022-01-13 | End: 2022-01-13 | Stop reason: HOSPADM

## 2022-01-13 RX ORDER — FENTANYL CITRATE 50 UG/ML
INJECTION, SOLUTION INTRAMUSCULAR; INTRAVENOUS AS NEEDED
Status: DISCONTINUED | OUTPATIENT
Start: 2022-01-13 | End: 2022-01-13

## 2022-01-13 RX ORDER — LIDOCAINE HYDROCHLORIDE 10 MG/ML
INJECTION, SOLUTION EPIDURAL; INFILTRATION; INTRACAUDAL; PERINEURAL AS NEEDED
Status: DISCONTINUED | OUTPATIENT
Start: 2022-01-13 | End: 2022-01-13

## 2022-01-13 RX ORDER — ACETAMINOPHEN 325 MG/1
975 TABLET ORAL EVERY 6 HOURS PRN
Status: DISCONTINUED | OUTPATIENT
Start: 2022-01-13 | End: 2022-01-13 | Stop reason: HOSPADM

## 2022-01-13 RX ADMIN — KETOROLAC TROMETHAMINE 15 MG: 30 INJECTION, SOLUTION INTRAMUSCULAR at 08:34

## 2022-01-13 RX ADMIN — LIDOCAINE HYDROCHLORIDE 2 ML: 10 INJECTION, SOLUTION EPIDURAL; INFILTRATION; INTRACAUDAL; PERINEURAL at 07:40

## 2022-01-13 RX ADMIN — SODIUM CHLORIDE, SODIUM LACTATE, POTASSIUM CHLORIDE, AND CALCIUM CHLORIDE: .6; .31; .03; .02 INJECTION, SOLUTION INTRAVENOUS at 08:36

## 2022-01-13 RX ADMIN — PROPOFOL 20 MG: 10 INJECTION, EMULSION INTRAVENOUS at 07:42

## 2022-01-13 RX ADMIN — EPHEDRINE SULFATE 5 MG: 50 INJECTION, SOLUTION INTRAVENOUS at 08:08

## 2022-01-13 RX ADMIN — FENTANYL CITRATE 50 MCG: 50 INJECTION INTRAMUSCULAR; INTRAVENOUS at 07:42

## 2022-01-13 RX ADMIN — PROPOFOL 130 MG: 10 INJECTION, EMULSION INTRAVENOUS at 07:40

## 2022-01-13 RX ADMIN — SODIUM CHLORIDE, SODIUM LACTATE, POTASSIUM CHLORIDE, AND CALCIUM CHLORIDE: .6; .31; .03; .02 INJECTION, SOLUTION INTRAVENOUS at 07:12

## 2022-01-13 RX ADMIN — PHENYLEPHRINE HYDROCHLORIDE 40 MCG/MIN: 10 INJECTION INTRAVENOUS at 07:49

## 2022-01-13 RX ADMIN — ONDANSETRON 4 MG: 2 INJECTION INTRAMUSCULAR; INTRAVENOUS at 07:40

## 2022-01-13 RX ADMIN — FENTANYL CITRATE 25 MCG: 50 INJECTION INTRAMUSCULAR; INTRAVENOUS at 08:25

## 2022-01-13 RX ADMIN — DEXAMETHASONE SODIUM PHOSPHATE 4 MG: 10 INJECTION, SOLUTION INTRAMUSCULAR; INTRAVENOUS at 07:40

## 2022-01-13 RX ADMIN — PROPOFOL 50 MG: 10 INJECTION, EMULSION INTRAVENOUS at 07:41

## 2022-01-13 RX ADMIN — FENTANYL CITRATE 25 MCG: 50 INJECTION INTRAMUSCULAR; INTRAVENOUS at 08:29

## 2022-01-13 NOTE — OP NOTE
PERATIVE REPORT  PATIENT NAME: Karlee Joseph    :  1945  MRN: [id number]  Pt Location: [facility]    SURGERY DATE: 2022    Surgeon(s) and Role:     * Feliciano Reyes DO - Primary     * Luke Collet, MD - Assisting    Preop Diagnosis: Thickened endometrium [R93 89]    Post-Op Diagnosis Codes: * Thickened endometrium [R93 89]    Procedure(s) (LRB):  EXAM UNDER ANESTHESIA (EUA) W/ D&C HYSTEROSCOPY SYMPHION (N/A)    Specimen(s):  ID Type Source Tests Collected by Time Destination   1 : ENDOMETRIAL LESION Tissue Endometrium TISSUE EXAM Feliciano Reyes DO 2022 0825    2 : ENDOMETRIAL CURETTINGS Tissue Endometrium TISSUE EXAM Feliciano Reyes DO 2201      Complications:  None    Operative Findings:  Normal appearing external female genitalia  Normal appearing vaginal mucosa  Small, grossly normal appearing cervix  Small, anteverted, mobile uterus  Normal bilateral ostia visualized  Atrophic endometrial lining with large vascularized polyp originating from the fundus, extending throughout entire cavity    Procedure:   Patient was identified in the holding area and brought back to the operating room where general anesthesia was initiated  Bilateral lower extremity compression boots were placed prior to initiation of anesthesia  Patient was placed in the dorsal lithotomy position and prepped and draped in a sterile fashion  Timeout procedure was completed  Exam under anesthesia was completed and above findings noted  A weighted speculum and caryn were placed into the vagina for visualization of the cervix  Two Allis clamps was used to grasp the anterior lip of the cervix  The uterus was sounded and the cervix was dilated to accommodate the insertion of the hysteroscope  Using the Symphion fluid management system according to product instructions, the device was primed prior to use   The hysteroscope was inserted and using normal saline as the distension fluid the endometrium was visualized  The above findings were noted  The Symphion resection device was inserted through the hysteroscope and used to remove the polyp under direct visualization  Good hemostasis was noted  The hysteroscope was removed and the specimen was sent to pathology for evaluation  Gentle, sharp curettage of endometrial lining was obtained in a 360 degree fashion  Tissue was obtained and sent to pathology for evaluation  Hysteroscope was reinserted with no new findings  Final fluid deficit 0 mL    All instruments were removed from the vagina and good hemostasis was noted  The patient was extubated and brought the the recovery room in stable condition  All sponge, lap and needle counts were correct         SIGNATURE: Corey Christensen MD  DATE: January 13, 2022  TIME: 8:55 AM

## 2022-01-13 NOTE — INTERVAL H&P NOTE
H&P reviewed  After examining the patient I find no changes in the patients condition since the H&P had been written      Vitals:    01/13/22 0630   BP: 147/86   Pulse: 65   Resp: 20   Temp: 98 °F (36 7 °C)   SpO2: 97%

## 2022-01-13 NOTE — DISCHARGE INSTRUCTIONS
Dilation and Curettage   WHAT YOU NEED TO KNOW:   Dilation and curettage (D&C) is a procedure to remove tissue from the lining of your uterus  DISCHARGE INSTRUCTIONS:   Call your local emergency number (911 in the [address] [facility]) if:   · You have signs of an allergic reaction, such as hives, trouble breathing, or severe swelling  Seek care immediately if:   · You have heavy vaginal bleeding that soaks 1 pad in 1 hour for 2 hours in a row  · You have a fever higher than 100 4°F (38°C)  · You have abdominal cramps for more than 2 days  · Your pain does not get better, even after treatment  Call your doctor or gynecologist if:   · You have foul-smelling vaginal discharge  · You do not get your monthly period  · You feel depressed or anxious  · You feel very tired and weak  · You have questions or concerns about your condition or care  Medicines: You may need any of the following:  · Prescription pain medicine  may be given  Ask your healthcare provider how to take this medicine safely  Some prescription pain medicines contain acetaminophen  Do not take other medicines that contain acetaminophen without talking to your healthcare provider  Too much acetaminophen may cause liver damage  Prescription pain medicine may cause constipation  Ask your healthcare provider how to prevent or treat constipation  · Antibiotics  help fight or prevent a bacterial infection  · Take your medicine as directed  Contact your healthcare provider if you think your medicine is not helping or if you have side effects  Tell him or her if you are allergic to any medicine  Keep a list of the medicines, vitamins, and herbs you take  Include the amounts, and when and why you take them  Bring the list or the pill bottles to follow-up visits  Carry your medicine list with you in case of an emergency  Self-care:   · Use sanitary pads if needed  You may have light bleeding for up to 2 weeks  Do not use tampons  Use sanitary pads instead  This will help prevent a vaginal infection  · Rest as needed  Slowly start to do more each day  Return to your daily activities as directed  · Do not have sex for at least 2 weeks after the procedure  This will help prevent an infection  · Use birth control right after your procedure  Your monthly period should start again in 4 to 8 weeks  During this time, you could still ovulate (release an egg)  Use birth control as directed to prevent pregnancy during this time  Follow up with your doctor or gynecologist as directed: Your uterus will be checked to make sure it is healing  You may also be given results of any tests done on removed tissue  Write down your questions so you remember to ask them during your visits  © [employer] 2021 Information is for End User's use only and may not be sold, redistributed or otherwise used for commercial purposes  All illustrations and images included in CareNotes® are the copyrighted property of [employer] Inc  or [facility] Tessa Miles   The above information is an  only  It is not intended as medical advice for individual conditions or treatments  Talk to your doctor, nurse or pharmacist before following any medical regimen to see if it is safe and effective for you

## 2022-01-13 NOTE — ANESTHESIA POSTPROCEDURE EVALUATION
Post-Op Assessment Note    CV Status:  Stable  Pain Score: 0    Pain management: adequate     Mental Status:  Alert and awake   Hydration Status:  Euvolemic   PONV Controlled:  Controlled   Airway Patency:  Patent      Post Op Vitals Reviewed: Yes      Staff: Anesthesiologist, CRNA         No complications documented      BP   148/79   Temp   97 3   Pulse  61   Resp   33   SpO2   99 face mask

## 2022-01-31 ENCOUNTER — OFFICE VISIT (OUTPATIENT)
Dept: OBGYN CLINIC | Facility: CLINIC | Age: 77
End: 2022-01-31

## 2022-01-31 VITALS
WEIGHT: 198 LBS | HEIGHT: 67 IN | SYSTOLIC BLOOD PRESSURE: 122 MMHG | DIASTOLIC BLOOD PRESSURE: 80 MMHG | BODY MASS INDEX: 31.08 KG/M2

## 2022-01-31 DIAGNOSIS — N84.0 ENDOMETRIAL POLYP: ICD-10-CM

## 2022-01-31 DIAGNOSIS — Z09 POSTOP CHECK: Primary | ICD-10-CM

## 2022-01-31 PROCEDURE — 99024 POSTOP FOLLOW-UP VISIT: CPT | Performed by: OBSTETRICS & GYNECOLOGY

## 2022-01-31 NOTE — PROGRESS NOTES
Assessment/Plan:  Reviewed pathology, again consistent with benign endometrial polyp, no hyperplasia  No problem-specific Assessment & Plan notes found for this encounter  Diagnoses and all orders for this visit:    Postop check    Endometrial polyp          Subjective:      Patient ID: Sabrina Kraus is a 68 y o  female  HPI     This is a very pleasant 68-year-old female P2 ( x2) status post D and C, endometrial polypectomy now postop 2 weeks  Patient is doing well  She denies any bleeding or spotting over the last 5 days  There has been no abdominal pain  No changes in bowel or bladder function  The following portions of the patient's history were reviewed and updated as appropriate: allergies, current medications, past family history, past medical history, past social history, past surgical history and problem list     Review of Systems   Constitutional: Negative for fatigue, fever and unexpected weight change  Respiratory: Negative for cough, chest tightness, shortness of breath and wheezing  Cardiovascular: Negative  Negative for chest pain and palpitations  Gastrointestinal: Negative  Negative for abdominal distention, abdominal pain, blood in stool, constipation, diarrhea, nausea and vomiting  Genitourinary: Negative  Negative for difficulty urinating, dyspareunia, dysuria, flank pain, frequency, genital sores, hematuria, pelvic pain, urgency, vaginal bleeding, vaginal discharge and vaginal pain  Skin: Negative for rash  Objective:      /80   Ht 5' 7" (1 702 m)   Wt 89 8 kg (198 lb)   BMI 31 01 kg/m²          Physical Exam  Constitutional:       Appearance: Normal appearance  Abdominal:      General: Bowel sounds are normal  There is no distension  Palpations: Abdomen is soft  Tenderness: There is no abdominal tenderness  There is no guarding        Comments: Vertical midline scar noted prior surgery   Genitourinary:     Labia:         Right: No rash, tenderness or lesion  Left: No rash, tenderness or lesion  Vagina: No signs of injury  No vaginal discharge or tenderness  Cervix: No cervical motion tenderness, discharge, friability, lesion, erythema or cervical bleeding  Uterus: Not enlarged and not tender  Adnexa:         Right: No mass, tenderness or fullness  Left: No mass, tenderness or fullness  Neurological:      Mental Status: She is alert and oriented to person, place, and time     Psychiatric:         Behavior: Behavior normal

## 2022-03-21 ENCOUNTER — OFFICE VISIT (OUTPATIENT)
Dept: GASTROENTEROLOGY | Facility: CLINIC | Age: 77
End: 2022-03-21
Payer: COMMERCIAL

## 2022-03-21 VITALS
HEART RATE: 76 BPM | TEMPERATURE: 99.6 F | DIASTOLIC BLOOD PRESSURE: 80 MMHG | HEIGHT: 67 IN | OXYGEN SATURATION: 99 % | SYSTOLIC BLOOD PRESSURE: 130 MMHG | WEIGHT: 199 LBS | BODY MASS INDEX: 31.23 KG/M2

## 2022-03-21 DIAGNOSIS — K21.9 GASTROESOPHAGEAL REFLUX DISEASE WITHOUT ESOPHAGITIS: Primary | ICD-10-CM

## 2022-03-21 DIAGNOSIS — R15.1 FECAL SMEARING: ICD-10-CM

## 2022-03-21 PROCEDURE — 99213 OFFICE O/P EST LOW 20 MIN: CPT | Performed by: PHYSICIAN ASSISTANT

## 2022-03-21 NOTE — PROGRESS NOTES
[facility] - Outpatient Follow-up Note  Whitley Sep 68 y o  female MRN: [id number]  Encounter: [id number]          ASSESSMENT AND PLAN:      Diagnoses and all orders for this visit:    Gastroesophageal reflux disease without esophagitis    Fecal smearing  -     Ambulatory Referral to Physical Therapy; Future    Patient presenting for follow up to EGD and colonoscopy with well controlled GERD on omeprazole and regular BMs, complicated by occasional fecal incontinence  Up to date with colonoscopy 12/15/2021 with no need for surveillance testing and a healthy anastomosis  Advised a trial of pelvic floor PT as she also has symptoms of urinary incontinence  She is not ready to make the appointment now, but will consider for the future  She has some symptoms of gas/bloating so information was provided about the low-FODMAP diet  We will plan to follow up in 1 year, unless needed sooner  ______________________________________________________________________    SUBJECTIVE:  Maria Isabel Ureña is a 51-year-old female with a history of GERD and diverticulosis who presents to the office for routine follow up  She had an EGD and colonoscopy in December 15, 2021 which showed a moderate hiatal hernia and moderate gastritis and colonoscopy showed diverticulosis and a healthy anastomosis from a previous sigmoid colectomy  She states she has been taking omeprazole, after pantoprazole did not improve her symptoms, and the omeprazole is working well for her  She is not having symptoms of heartburn, reflux, dysphagia or odynophagia  She has bowel movements that are Vermilion score 3, ranging from 1 to 4 or 5 times daily  She feels she eats a significant amount of fiber and also takes a probiotic daily  She notes that occasionally she will sense fecal incontinence and have a small amount of soft stool in her undergarments, although this also happens without the sensation of stool passage    No melena or hematochezia  She has symptoms of bloating and flatulence, worse when eating certain vegetables or legumes  REVIEW OF SYSTEMS IS OTHERWISE NEGATIVE  Historical Information   Past Medical History:   Diagnosis Date    Abnormal Pap smear of cervix     Basal cell carcinoma (BCC) in situ of skin     on back    GERD (gastroesophageal reflux disease)     Hyperlipidemia     Hypertension     Incisional hernia     Uterine polyp      Past Surgical History:   Procedure Laterality Date    COLOSTOMY  2011    partial, end, distal segment closure    ENTEROSTOMY CLOSURE      with anastomosis    EXAMINATION UNDER ANESTHESIA N/A 1/13/2022    Procedure: EXAM UNDER ANESTHESIA (EUA) W/ D&C HYSTEROSCOPY SYMPHION;  Surgeon: Feliciano Reyes DO;  [facility];  Service: Gynecology    HERNIA REPAIR  2012/2013/2014    HERNIA REPAIR      Pt states with a strangled hernia    HIP SURGERY Right 2010    HIP SURGERY Left 2011    INCISIONAL HERNIA REPAIR      Incarcerated    GA DILATION OF VAGINA W ANESTH N/A 12/14/2020    Procedure: EXAM UNDER ANESTHESIA (EUA);   Surgeon: Feliciano Reyse DO;  [facility];  Service: Gynecology    GA HYSTEROSCOPY,RMV MYOMA N/A 12/14/2020    Procedure: endometrial polypectomy;  Surgeon: Feliciano Reyes DO;  [facility];  Service: Gynecology    GA HYSTEROSCOPY,W/ENDO BX N/A 12/14/2020    Procedure: DILATATION AND CURETTAGE (D&C) WITH HYSTEROSCOPY;  Surgeon: Feliciano Reyes DO;  [facility];  Service: Gynecology    TOTAL HIP ARTHROPLASTY Bilateral     2011, 2012     Social History   Social History     Substance and Sexual Activity   Alcohol Use Yes    Comment: weekly, occasional use     Social History     Substance and Sexual Activity   Drug Use Never     Social History     Tobacco Use   Smoking Status Former Smoker   Smokeless Tobacco Never Used   Tobacco Comment    Pt states with hx 40 years ago     Family History   Problem Relation Age of Onset  Heart disease Mother     Coronary artery disease Mother     Stroke Mother     Hypertension Mother     Heart failure Father     Coronary artery disease Father     Colon cancer Maternal Uncle 61    No Known Problems Maternal Grandmother     No Known Problems Maternal Grandfather     No Known Problems Paternal Grandmother     No Known Problems Paternal Grandfather     No Known Problems Son     No Known Problems Son        Meds/Allergies       Current Outpatient Medications:     amLODIPine (NORVASC) 5 mg tablet    Ascorbic Acid (VITAMIN C PO)    aspirin 81 MG tablet    B Complex Vitamins (VITAMIN B COMPLEX) TABS    Cholecalciferol ([employer] VITAMIN D) 2000 units CAPS    Coenzyme Q10 (CO Q 10 PO)    Probiotic Product (ALIGN PO)    rosuvastatin (CRESTOR) 10 MG tablet    rosuvastatin (CRESTOR) 5 mg tablet    Allergies   Allergen Reactions    Iodides Lip Swelling    Iodine - Food Allergy Lip Swelling    Other            Objective     Blood pressure 130/80, pulse 76, temperature 99 6 °F (37 6 °C), temperature source Tympanic, height 5' 7" (1 702 m), weight 90 3 kg (199 lb), SpO2 99 %, not currently breastfeeding  Body mass index is 31 17 kg/m²  PHYSICAL EXAM:      General Appearance:   Alert, cooperative, no distress   HEENT:   Normocephalic, atraumatic, sclera anicteric      Neck:  Supple, symmetrical, no lymphadenopathy, trachea midline   Lungs:   Clear to auscultation bilaterally; no rales, rhonchi or wheezing; respirations unlabored    Heart[de-identified]   Regular rate and rhythm; no murmur, rub, or gallop  Abdomen:   Soft, non-tender without rebound or guarding, non-distended; positive bowel sounds; no masses, no organomegaly    Genitalia:   Deferred    Rectal:   Deferred    Extremities:  No cyanosis, clubbing or edema    Pulses:  2+ and symmetric    Skin:  No jaundice, rashes, or lesions          Lab Results:       Radiology Results:   No results found          Abhi Grey PA-C

## 2022-04-27 ENCOUNTER — TELEPHONE (OUTPATIENT)
Dept: OBGYN CLINIC | Facility: CLINIC | Age: 77
End: 2022-04-27

## 2022-04-27 NOTE — TELEPHONE ENCOUNTER
Dr Shine  RE: B/L visco injections  #: [phone]       Patient is calling into the office to see if visco injections could be ordered for bilateral knees  Would also like to do her shoulder cortisone injection at the same appt   Offered to schedule shoulder now and patient declined    Please call patient back

## 2022-05-16 ENCOUNTER — TELEPHONE (OUTPATIENT)
Dept: OBGYN CLINIC | Facility: HOSPITAL | Age: 77
End: 2022-05-16

## 2022-05-16 NOTE — TELEPHONE ENCOUNTER
Patient called back stating she had been disconnected while attempting to schedule her gel injections  Patient advised she was put on hold while the rep checked to see if the PA had an open schedule and was subsequently disconnected  I advised that per the authorization/referral, I could schedule after 06/22/2022  Patient asked why it had to be so far out and stated it has been well over six months since her last injections  Patient's last injection was 12/21/2021  Patient states she needs the injections sooner, due to traveling in July    Patient was transferred to an injection auth specialist for further guidance

## 2022-07-26 ENCOUNTER — PROCEDURE VISIT (OUTPATIENT)
Dept: OBGYN CLINIC | Facility: HOSPITAL | Age: 77
End: 2022-07-26
Payer: COMMERCIAL

## 2022-07-26 VITALS
HEIGHT: 67 IN | DIASTOLIC BLOOD PRESSURE: 84 MMHG | HEART RATE: 70 BPM | SYSTOLIC BLOOD PRESSURE: 148 MMHG | WEIGHT: 191 LBS | BODY MASS INDEX: 29.98 KG/M2

## 2022-07-26 DIAGNOSIS — M17.12 PRIMARY OSTEOARTHRITIS OF LEFT KNEE: Primary | ICD-10-CM

## 2022-07-26 DIAGNOSIS — M17.11 PRIMARY OSTEOARTHRITIS OF RIGHT KNEE: ICD-10-CM

## 2022-07-26 DIAGNOSIS — M19.012 ARTHRITIS OF LEFT SHOULDER REGION: ICD-10-CM

## 2022-07-26 PROCEDURE — 20610 DRAIN/INJ JOINT/BURSA W/O US: CPT | Performed by: PHYSICIAN ASSISTANT

## 2022-07-26 PROCEDURE — 99213 OFFICE O/P EST LOW 20 MIN: CPT | Performed by: PHYSICIAN ASSISTANT

## 2022-07-26 RX ORDER — HYALURONATE SODIUM 10 MG/ML
20 SYRINGE (ML) INTRAARTICULAR
Status: COMPLETED | OUTPATIENT
Start: 2022-07-26 | End: 2022-07-26

## 2022-07-26 RX ORDER — BUPIVACAINE HYDROCHLORIDE 2.5 MG/ML
4 INJECTION, SOLUTION INFILTRATION; PERINEURAL
Status: COMPLETED | OUTPATIENT
Start: 2022-07-26 | End: 2022-07-26

## 2022-07-26 RX ORDER — ALPRAZOLAM 0.25 MG/1
TABLET ORAL
COMMUNITY
Start: 2022-07-07

## 2022-07-26 RX ORDER — BETAMETHASONE SODIUM PHOSPHATE AND BETAMETHASONE ACETATE 3; 3 MG/ML; MG/ML
12 INJECTION, SUSPENSION INTRA-ARTICULAR; INTRALESIONAL; INTRAMUSCULAR; SOFT TISSUE
Status: COMPLETED | OUTPATIENT
Start: 2022-07-26 | End: 2022-07-26

## 2022-07-26 RX ADMIN — Medication 20 MG: at 15:57

## 2022-07-26 RX ADMIN — BUPIVACAINE HYDROCHLORIDE 4 ML: 2.5 INJECTION, SOLUTION INFILTRATION; PERINEURAL at 15:57

## 2022-07-26 RX ADMIN — BETAMETHASONE SODIUM PHOSPHATE AND BETAMETHASONE ACETATE 12 MG: 3; 3 INJECTION, SUSPENSION INTRA-ARTICULAR; INTRALESIONAL; INTRAMUSCULAR; SOFT TISSUE at 15:57

## 2022-07-26 NOTE — PROGRESS NOTES
Assessment:    Bilateral knee osteoarthritis       Plan:    Euflexxa 1/3 provided to the bilateral knee joints today, and the patient tolerated well  Post-injection ice is recommended  Follow-up next week for 2/3    Additionally, a left shoulder GH joint injection was provided for symptomatic relief, and she tolerated well  Subjective:     HPI    Patient ID:  Liliana Gutierrez is a 68 y o  female presenting for Euflexxa 1/3 secondary to chronic bilateral knee pain from osteoarthritis  She has had this in the past in the usually lasts anywhere from 6 months to one year  She thinks this time it last for about 6 months  She denies any new pain to her knees  She has not noticed any swelling or erythema  The following portions of the patient's history were reviewed and updated as appropriate: allergies, current medications, past family history, past medical history, past social history, past surgical history and problem list     Review of Systems     Objective:    Imaging:  No new imaging performed for today's visit      Physical Exam     Vitals:    07/26/22 1527   BP: 148/84   Pulse: 70       Orthopedic Examination:  Bilateral knees     Inspection:  No open wounds or erythema  There is no effusion  Palpation:  There is no warmth of the skin  Medial and lateral joint lines are nontender    Range-of-motion:  Normal bilateral knee range-of-motion    Strength:  Normal    Sensation:  Normal    Special Tests:  Extensor mechanism intact   Stable to varus and valgus stress bilaterally    Large joint arthrocentesis: R knee  Universal Protocol:  Consent: Verbal consent obtained    Risks and benefits: risks, benefits and alternatives were discussed  Consent given by: patient  Patient identity confirmed: verbally with patient    Supporting Documentation  Indications: pain   Procedure Details  Location: knee - R knee  Preparation: Patient was prepped and draped in the usual sterile fashion  Needle size: 22 G  Approach: anterolateral  Medications administered: 20 mg Sodium Hyaluronate 20 MG/2ML  Specialty Pharmacy Supplied: received medications from pharmacy  Patient tolerance: patient tolerated the procedure well with no immediate complications  Dressing:  Sterile dressing applied    Large joint arthrocentesis: L knee  Universal Protocol:  Consent: Verbal consent obtained  Risks and benefits: risks, benefits and alternatives were discussed  Consent given by: patient  Patient identity confirmed: verbally with patient    Supporting Documentation  Indications: pain   Procedure Details  Location: knee - L knee  Preparation: Patient was prepped and draped in the usual sterile fashion  Needle size: 22 G  Approach: anterolateral  Medications administered: 20 mg Sodium Hyaluronate 20 MG/2ML  Specialty Pharmacy Supplied: received medications from pharmacy  Patient tolerance: patient tolerated the procedure well with no immediate complications  Dressing:  Sterile dressing applied    Large joint arthrocentesis: L glenohumeral  Universal Protocol:  Consent: Verbal consent obtained    Risks and benefits: risks, benefits and alternatives were discussed  Consent given by: patient  Patient identity confirmed: verbally with patient    Supporting Documentation  Indications: pain   Procedure Details  Location: shoulder - L glenohumeral  Preparation: Patient was prepped and draped in the usual sterile fashion  Needle size: 22 G  Approach: posterior  Medications administered: 4 mL bupivacaine 0 25 %; 12 mg betamethasone acetate-betamethasone sodium phosphate 6 (3-3) mg/mL    Patient tolerance: patient tolerated the procedure well with no immediate complications  Dressing:  Sterile dressing applied

## 2022-08-02 ENCOUNTER — PROCEDURE VISIT (OUTPATIENT)
Dept: OBGYN CLINIC | Facility: HOSPITAL | Age: 77
End: 2022-08-02
Payer: COMMERCIAL

## 2022-08-02 VITALS
BODY MASS INDEX: 29.91 KG/M2 | HEIGHT: 67 IN | HEART RATE: 77 BPM | DIASTOLIC BLOOD PRESSURE: 75 MMHG | SYSTOLIC BLOOD PRESSURE: 116 MMHG

## 2022-08-02 DIAGNOSIS — M17.0 PRIMARY OSTEOARTHRITIS OF BOTH KNEES: Primary | ICD-10-CM

## 2022-08-02 DIAGNOSIS — M17.12 PRIMARY OSTEOARTHRITIS OF LEFT KNEE: ICD-10-CM

## 2022-08-02 DIAGNOSIS — M17.11 PRIMARY OSTEOARTHRITIS OF RIGHT KNEE: ICD-10-CM

## 2022-08-02 PROCEDURE — 20610 DRAIN/INJ JOINT/BURSA W/O US: CPT | Performed by: ORTHOPAEDIC SURGERY

## 2022-08-02 NOTE — PROGRESS NOTES
1  Primary osteoarthritis of both knees  Large joint arthrocentesis: bilateral knee   2  Primary osteoarthritis of left knee     3  Primary osteoarthritis of right knee       Patient is here for her second injection of Euflexxa into the bilateral knee  Patient reports improvement in her knee pain since last weeks injection  She notes improvement in her should pain as well since last weeks injections  All organ systems normal  Physical exam of the knee shows no effusion no ecchymosis  Large joint arthrocentesis: bilateral knee  Universal Protocol:  Consent given by: patient  Time out: Immediately prior to procedure a "time out" was called to verify the correct patient, procedure, equipment, support staff and site/side marked as required    Timeout called at: 8/2/2022 3:42 PM   Supporting Documentation  Indications: pain   Procedure Details  Location: knee - bilateral knee  Needle size: 22 G  Approach: anteromedial    Medications (Right): 20 mg Sodium Hyaluronate 20 MG/2MLMedications (Left): 20 mg Sodium Hyaluronate 20 MG/2ML   Patient tolerance: patient tolerated the procedure well with no immediate complications          Patient tolerated procedure follow up one week

## 2022-08-03 RX ORDER — HYALURONATE SODIUM 10 MG/ML
20 SYRINGE (ML) INTRAARTICULAR
Status: COMPLETED | OUTPATIENT
Start: 2022-08-03 | End: 2022-08-03

## 2022-08-03 RX ADMIN — Medication 20 MG: at 13:58

## 2022-08-09 ENCOUNTER — PROCEDURE VISIT (OUTPATIENT)
Dept: OBGYN CLINIC | Facility: HOSPITAL | Age: 77
End: 2022-08-09
Payer: COMMERCIAL

## 2022-08-09 VITALS
HEIGHT: 67 IN | DIASTOLIC BLOOD PRESSURE: 81 MMHG | BODY MASS INDEX: 30.13 KG/M2 | HEART RATE: 73 BPM | WEIGHT: 192 LBS | SYSTOLIC BLOOD PRESSURE: 138 MMHG

## 2022-08-09 DIAGNOSIS — M17.0 PRIMARY OSTEOARTHRITIS OF BOTH KNEES: Primary | ICD-10-CM

## 2022-08-09 PROCEDURE — 20610 DRAIN/INJ JOINT/BURSA W/O US: CPT

## 2022-08-09 RX ORDER — HYALURONATE SODIUM 10 MG/ML
20 SYRINGE (ML) INTRAARTICULAR
Status: COMPLETED | OUTPATIENT
Start: 2022-08-09 | End: 2022-08-09

## 2022-08-09 RX ADMIN — Medication 20 MG: at 16:00

## 2022-08-09 NOTE — PROGRESS NOTES
Assessment:   Diagnosis ICD-10-CM Associated Orders   1  Primary osteoarthritis of both knees  M17 0        Plan:     Pt was offered, accepted, performed and injection of  #3 Euflexxa to Bilateral knees for symptomatic relief  Pt tolerated injections well  Ice and post injection protocol advised  Weigh bearing activities as tolerated  To do next visit:  Return in about 3 months (around 11/9/2022) for Recheck  The above stated was discussed in layman's terms and the patient expressed understanding  All questions were answered to the patient's satisfaction  Scribe Attestation    I,:  Monica Wu am acting as a scribe while in the presence of the attending physician :       I,:  Juan Fenton MD personally performed the services described in this documentation    as scribed in my presence :             Subjective:   Aixa Campbell is a 68 y o  female who presents today for a follow-up evaluation of Her bilateral knee #3 Visco injection  Pt reports no pain       Review of systems negative unless otherwise specified in HPI  Review of Systems    Past Medical History:   Diagnosis Date    Abnormal Pap smear of cervix     Basal cell carcinoma (BCC) in situ of skin     on back    GERD (gastroesophageal reflux disease)     Hyperlipidemia     Hypertension     Incisional hernia     Uterine polyp        Past Surgical History:   Procedure Laterality Date    COLOSTOMY  2011    partial, end, distal segment closure    ENTEROSTOMY CLOSURE      with anastomosis    EXAMINATION UNDER ANESTHESIA N/A 1/13/2022    Procedure: EXAM UNDER ANESTHESIA (EUA) W/ D&C HYSTEROSCOPY SYMPHION;  Surgeon: Joi Bennett DO;  [facility];  Service: Gynecology    HERNIA REPAIR  2012/2013/2014    HERNIA REPAIR      Pt states with a strangled hernia    HIP SURGERY Right 2010    HIP SURGERY Left 2011    INCISIONAL HERNIA REPAIR      Incarcerated    GA DILATION OF VAGINA W ANESTH N/A 12/14/2020 Procedure: EXAM UNDER ANESTHESIA (EUA);   Surgeon: Connor Bhatti DO;  [facility];  Service: Gynecology    IL HYSTEROSCOPY,RMV MYOMA N/A 12/14/2020    Procedure: endometrial polypectomy;  Surgeon: Connor Bhatti DO;  [facility];  Service: Gynecology    IL HYSTEROSCOPY,W/ENDO BX N/A 12/14/2020    Procedure: DILATATION AND CURETTAGE (D&C) WITH HYSTEROSCOPY;  Surgeon: Connor Bhatti DO;  [facility];  Service: Gynecology    TOTAL HIP ARTHROPLASTY Bilateral     2011, 2012       Family History   Problem Relation Age of Onset    Heart disease Mother     Coronary artery disease Mother     Stroke Mother     Hypertension Mother     Heart failure Father     Coronary artery disease Father     Colon cancer Maternal Uncle 61    No Known Problems Maternal Grandmother     No Known Problems Maternal Grandfather     No Known Problems Paternal Grandmother     No Known Problems Paternal Grandfather     No Known Problems Son     No Known Problems Son        Social History     Occupational History    Not on file   Tobacco Use    Smoking status: Former Smoker    Smokeless tobacco: Never Used    Tobacco comment: Pt states with hx 40 years ago   Vaping Use    Vaping Use: Never used   Substance and Sexual Activity    Alcohol use: Yes     Comment: weekly, occasional use    Drug use: Never    Sexual activity: Not Currently         Current Outpatient Medications:     ALPRAZolam (XANAX) 0 25 mg tablet, TAKE 1 TO 2 TABLETS BY MOUTH 30 MINUTES PRIOR TO PROCEDURE, Disp: , Rfl:     amLODIPine (NORVASC) 5 mg tablet, Take 5 mg by mouth daily, Disp: , Rfl: 3    Ascorbic Acid (VITAMIN C PO), Take 1 tablet by mouth daily, Disp: , Rfl:     aspirin 81 MG tablet, Take 81 mg by mouth daily , Disp: , Rfl:     B Complex Vitamins (VITAMIN B COMPLEX) TABS, Take 1 tablet by mouth daily, Disp: , Rfl:     Cholecalciferol (CVS VITAMIN D) 2000 units CAPS, Take by mouth daily, Disp: , Rfl:    Coenzyme Q10 (CO Q 10 PO), Take 1 tablet by mouth daily , Disp: , Rfl:     Probiotic Product (ALIGN PO), Take 1 tablet by mouth daily , Disp: , Rfl:     rosuvastatin (CRESTOR) 10 MG tablet, Take 10 mg by mouth daily Every other day, Disp: , Rfl:     rosuvastatin (CRESTOR) 5 mg tablet, Take 5 mg by mouth every other day, Disp: , Rfl:     Allergies   Allergen Reactions    Iodides Lip Swelling    Iodine - Food Allergy Lip Swelling    Other           Vitals:    08/09/22 1537   BP: 138/81   Pulse: 73       Objective:                    Right Knee Exam     Muscle Strength   The patient has normal right knee strength  Range of Motion   The patient has normal right knee ROM  Other   Erythema: absent  Sensation: normal  Effusion: no effusion present      Left Knee Exam     Muscle Strength   The patient has normal left knee strength  Range of Motion   The patient has normal left knee ROM  Other   Erythema: absent  Sensation: normal  Effusion: no effusion present            Diagnostics, reviewed and taken today if performed as documented:    None performed       Procedures, if performed today:    Large joint arthrocentesis: R knee  Universal Protocol:  Consent: Verbal consent obtained  Risks and benefits: risks, benefits and alternatives were discussed  Consent given by: patient  Timeout called at: 8/9/2022 3:53 PM   Patient understanding: patient states understanding of the procedure being performed  Site marked: the operative site was marked  Patient identity confirmed: verbally with patient    Supporting Documentation  Indications: pain   Procedure Details  Location: knee - R knee  Needle size: 22 G  Ultrasound guidance: no  Approach: anterolateral  Medications administered: 20 mg Sodium Hyaluronate 20 MG/2ML  Specialty Pharmacy Supplied: received medications from pharmacy    Large joint arthrocentesis: L knee  Universal Protocol:  Consent: Verbal consent obtained    Risks and benefits: risks, benefits and alternatives were discussed  Consent given by: patient  Timeout called at: 8/9/2022 3:53 PM   Patient understanding: patient states understanding of the procedure being performed  Site marked: the operative site was marked  Patient identity confirmed: verbally with patient    Supporting Documentation  Indications: pain   Procedure Details  Location: knee - L knee  Needle size: 22 G  Ultrasound guidance: no  Approach: anterolateral  Medications administered: 20 mg Sodium Hyaluronate 20 MG/2ML  Specialty Pharmacy Supplied: received medications from pharmacy            Portions of the record may have been created with voice recognition software  Occasional wrong word or "sound a like" substitutions may have occurred due to the inherent limitations of voice recognition software  Read the chart carefully and recognize, using context, where substitutions have occurred

## 2022-08-19 ENCOUNTER — HOSPITAL ENCOUNTER (OUTPATIENT)
Dept: RADIOLOGY | Age: 77
Discharge: HOME | End: 2022-08-19
Payer: COMMERCIAL

## 2022-08-19 DIAGNOSIS — G43.011 INTRACTABLE MIGRAINE WITHOUT AURA AND WITH STATUS MIGRAINOSUS: ICD-10-CM

## 2022-08-19 PROCEDURE — 70553 MRI BRAIN STEM W/O & W/DYE: CPT

## 2022-08-19 PROCEDURE — A9585 GADOBUTROL INJECTION: HCPCS | Performed by: INTERNAL MEDICINE

## 2022-08-19 PROCEDURE — G1004 CDSM NDSC: HCPCS

## 2022-08-19 RX ADMIN — GADOBUTROL 8 ML: 604.72 INJECTION INTRAVENOUS at 09:47

## 2022-09-01 ENCOUNTER — TELEPHONE (OUTPATIENT)
Dept: NEUROLOGY | Facility: CLINIC | Age: 77
End: 2022-09-01

## 2022-09-01 NOTE — TELEPHONE ENCOUNTER
LMOM for the patient to call us back if she is interested in being seen by Dr Anamaria Simmons next week per his request  If the patient calls back please schedule on 9/6 or 9/7

## 2022-09-02 NOTE — TELEPHONE ENCOUNTER
Add on :  Dr Drummond at the [facility] office at 12 pm  Yoli Fitting per dr drummond       E-VRERIFIED     Thank you,     Blanche Brysoniver

## 2022-09-06 ENCOUNTER — CONSULT (OUTPATIENT)
Dept: NEUROLOGY | Facility: CLINIC | Age: 77
End: 2022-09-06
Payer: COMMERCIAL

## 2022-09-06 VITALS
HEART RATE: 64 BPM | HEIGHT: 67 IN | TEMPERATURE: 96.7 F | DIASTOLIC BLOOD PRESSURE: 72 MMHG | BODY MASS INDEX: 30.86 KG/M2 | SYSTOLIC BLOOD PRESSURE: 146 MMHG | WEIGHT: 196.6 LBS

## 2022-09-06 DIAGNOSIS — G44.85 PRIMARY STABBING HEADACHE: Primary | ICD-10-CM

## 2022-09-06 DIAGNOSIS — G43.109 MIGRAINE AURA WITHOUT HEADACHE (MIGRAINE EQUIVALENTS): ICD-10-CM

## 2022-09-06 PROCEDURE — 99203 OFFICE O/P NEW LOW 30 MIN: CPT | Performed by: PSYCHIATRY & NEUROLOGY

## 2022-09-06 RX ORDER — ESCITALOPRAM OXALATE 5 MG/1
5 TABLET ORAL DAILY
COMMUNITY

## 2022-09-06 NOTE — PROGRESS NOTES
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:  Perfecto Merchant presents for an initial consultation with regard to her history frequent migraine auras without headache and newer onset episodes of ice pick headache  The timing and nature of her symptoms is more consistent with primary stabbing headache as opposed to trigeminal neuralgia  Her neurologic exam is quite good in the office although she does have some horizontal diplopia when her eyes are directed upward and to the right, and a little bit of photophobia in the right eye  I personally reviewed her MRI and agree that there is no evidence inflammation, tumor, or other significant structural changes  She also had a CT angiogram which had been performed 1 year ago and which showed no evidence of aneurysm  Considering that CT angiogram can visualize aneurysms all the way down to 1 mm it is unlikely that she developed a large enough aneurysm to cause symptoms like this over the course of only 1 year   -she has had a sedimentation rate and CRP checked which are negative however an additional screen for other causes of vasculitis would be warranted  I will request those labs to be performed  - I would like for her to talk with her ophthalmologist about the double vision  This could be consistent with difficulty in the inferior oblique muscle on the right-hand side, but will defer to their expertise in this regard  -at this point she confirms that the symptoms are not bothersome enough to her to affect her life from day-to-day    She is not interested in preventive medication at this time however in the future if she would like to consider medication for prevention we would be more than happy to have that conversation and to discussed several potential options   - I would like for her to keep track of the frequency of her stabbing headaches as well as her migraine auras using a calendar or application on her phone as she sees fit  That will help us look for any specific patterns and also ensure that we know how often this is really happening  She will return in 6 months time to see either myself or 1 of the migraine advanced practice providers however if she does begin to experience more significant headaches and would like to consider treatment sooner than that I would like for her to contact our office and we would be more than happy to help  Diagnoses and all orders for this visit:    Primary stabbing headache  -     YULISA w/Reflex if Positive; Future  -     Angiotensin converting enzyme; Future  -     Sjogren's Antibodies; Future  -     TSH, 3rd generation with Free T4 reflex; Future  -     RF Screen w/ Reflex to Titer; Future  -     Protein electrophoresis, serum; Future    Migraine aura without headache (migraine equivalents)  -     YULISA w/Reflex if Positive; Future  -     Angiotensin converting enzyme; Future  -     Sjogren's Antibodies; Future  -     TSH, 3rd generation with Free T4 reflex; Future  -     RF Screen w/ Reflex to Titer; Future  -     Protein electrophoresis, serum; Future    Other orders  -     escitalopram (LEXAPRO) 5 mg tablet; Take 5 mg by mouth daily        Subjective:    MATT Crowleyblayne Alexis presents for an initial consultation with regard to primary stabbing headaches  She does have a history of what she calls ocular migraines  Those began at approximately age 48  They are described as a coiled snake like structure which may be black green or yellow with triangles  Will be present on 1 side of her vision or the other but not both  It will last precisely for 20 minutes and then resolved entirely  There is no associated headache or pain, only occasional nausea  She notes that she did have the episodes on a daily bases few times particularly right after surgery    She notes that she did have any in the last 2 months but then had to than this past weekend  Her primary stabbing headaches started few months ago  They began suddenly with no antecedent trauma, infection, or other changes  The sensation is stabbing/ searing/shooting and monotonously affect the right temporal region  The pain will last for a 2nd and be gone  It is 8/10 in intensity  She does not note any specific triggers per se  When it 1st started it was happening once per day, and at the peak severity was happening maybe 5 or 6 times per day  It actually resolved for 7 weeks and has now returned to a degree  She has seen her ophthalmologist who did not note any significant changes  She had an MRI of the brain with and without contrast which I personally reviewed in the office today and agree that there is no evidence of mass lesion  In the area  Notably she had 1 episode of a sudden pulling sensation in the right eye and now has horizontal diplopia when she looks upward and to the right  At this point she confirms the headaches are not debilitating/not affecting her day-to-day living  Her biggest concern was that this was actually a secondary headache syndrome which is a very reasonable concern  She did have a sedimentation rate and C-reactive protein which were negative  She does not endorse symptoms of jaw claudication          Past Medical History:   Diagnosis Date    Abnormal Pap smear of cervix     Basal cell carcinoma (BCC) in situ of skin     on back    GERD (gastroesophageal reflux disease)     Hyperlipidemia     Hypertension     Incisional hernia     Uterine polyp          Current Outpatient Medications:     ALPRAZolam (XANAX) 0 25 mg tablet, TAKE 1 TO 2 TABLETS BY MOUTH 30 MINUTES PRIOR TO PROCEDURE, Disp: , Rfl:     amLODIPine (NORVASC) 5 mg tablet, Take 5 mg by mouth daily, Disp: , Rfl: 3    Ascorbic Acid (VITAMIN C PO), Take 1 tablet by mouth daily, Disp: , Rfl:     aspirin 81 MG tablet, Take 81 mg by mouth if needed, Disp: , Rfl:     B Complex Vitamins (VITAMIN B COMPLEX) TABS, Take 1 tablet by mouth daily, Disp: , Rfl:     Cholecalciferol 50 MCG (2000 UT) CAPS, Take by mouth daily, Disp: , Rfl:     Coenzyme Q10 (CO Q 10 PO), Take 1 tablet by mouth daily , Disp: , Rfl:     escitalopram (LEXAPRO) 5 mg tablet, Take 5 mg by mouth daily, Disp: , Rfl:     Probiotic Product (ALIGN PO), Take 1 tablet by mouth daily , Disp: , Rfl:     rosuvastatin (CRESTOR) 10 MG tablet, Take 10 mg by mouth daily Every other day, Disp: , Rfl:     rosuvastatin (CRESTOR) 5 mg tablet, Take 5 mg by mouth every other day, Disp: , Rfl:        Objective:    Blood pressure 146/72, pulse 64, temperature (!) 96 7 °F (35 9 °C), temperature source Temporal, height 5' 7" (1 702 m), weight 89 2 kg (196 lb 9 6 oz), not currently breastfeeding  Physical Exam    Neurological Exam      At the time of my examination she was awake, alert, and in no distress  She is a good historian was no dysarthria or aphasia  Cranial nerves 2-12 were symmetrically intact including bilateral visual field although she does have some photophobia of the right eye  There is no obvious dysconjugate gaze but when she looks up and to the right she notes horizontal diplopia  Motor testing reveals symmetric strength throughout the bilateral upper and lower extremities with limitations based on pain in the left shoulder  There is no drift  Finger-to-nose reveals intact proprioceptive function with no dysmetria, tremor, or ataxia  Sensation was intact to temperature and vibration in the bilateral upper and lower extremities  Deep tendon reflexes were reasonably symmetric  She was able to rise easily without assistance and her gait was stable  There is no tenderness  With palpation of the temporal area  ROS:    Review of Systems   Constitutional: Negative  Negative for appetite change and fever  HENT: Negative    Negative for hearing loss, tinnitus, trouble swallowing and voice change  Eyes: Positive for pain  Negative for photophobia  Respiratory: Negative  Negative for shortness of breath  Cardiovascular: Negative  Negative for palpitations  Gastrointestinal: Negative  Negative for nausea and vomiting  Endocrine: Negative  Negative for cold intolerance  Genitourinary: Negative  Negative for dysuria, frequency and urgency  Musculoskeletal: Negative  Negative for myalgias and neck pain  Skin: Negative  Negative for rash  Neurological: Positive for dizziness, light-headedness and headaches  Negative for tremors, seizures, syncope, facial asymmetry, speech difficulty, weakness and numbness  Hematological: Negative  Does not bruise/bleed easily  Psychiatric/Behavioral: Positive for hallucinations (only upon wakening) and sleep disturbance  Negative for confusion  Reviewed ROS as entered by medical assistant

## 2022-09-06 NOTE — PATIENT INSTRUCTIONS
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:  Shala Trujillo presents for an initial consultation with regard to her history frequent migraine auras without headache and newer onset episodes of ice pick headache  The timing and nature of her symptoms is more consistent with primary stabbing headache as opposed to trigeminal neuralgia  Her neurologic exam is quite good in the office although she does have some horizontal diplopia when her eyes are directed upward and to the right, and a little bit of photophobia in the right eye  I personally reviewed her MRI and agree that there is no evidence inflammation, tumor, or other significant structural changes  She also had a CT angiogram which had been performed 1 year ago and which showed no evidence of aneurysm  Considering that CT angiogram can visualize aneurysms all the way down to 1 mm it is unlikely that she developed a large enough aneurysm to cause symptoms like this over the course of only 1 year   -she has had a sedimentation rate and CRP checked which are negative however an additional screen for other causes of vasculitis would be warranted  I will request those labs to be performed  - I would like for her to talk with her ophthalmologist about the double vision  This could be consistent with difficulty in the inferior oblique muscle on the right-hand side, but will defer to their expertise in this regard  -at this point she confirms that the symptoms are not bothersome enough to her to affect her life from day-to-day  She is not interested in preventive medication at this time however in the future if she would like to consider medication for prevention we would be more than happy to have that conversation and to discussed several potential options   - I would like for her to keep track of the frequency of her stabbing headaches as well as her migraine auras using a calendar or application on her phone as she sees fit    That will help us look for any specific patterns and also ensure that we know how often this is really happening  She will return in 6 months time to see either myself or 1 of the migraine advanced practice providers however if she does begin to experience more significant headaches and would like to consider treatment sooner than that I would like for her to contact our office and we would be more than happy to help

## 2022-09-16 ENCOUNTER — TELEPHONE (OUTPATIENT)
Dept: SLEEP CENTER | Facility: CLINIC | Age: 77
End: 2022-09-16

## 2022-09-16 NOTE — TELEPHONE ENCOUNTER
PAPER H&P IS SCANNED INTO MEDIA    ----- Message from Mikala Phillip MD sent at 9/14/2022  1:59 PM EDT -----  Data provided is insufficient to justify a sleep study  ----- Message -----  From: Monica Cortes  Sent: 6/64/5379   3:23 PM EDT  To: Sleep Medicine [facility] Provider    This Home sleep study needs approval      If approved please sign and return to clerical pool  If denied please include reasons why  Also provide alternative testing if warranted  Please sign and return to clerical pool

## 2022-11-15 ENCOUNTER — OFFICE VISIT (OUTPATIENT)
Dept: OBGYN CLINIC | Facility: HOSPITAL | Age: 77
End: 2022-11-15

## 2022-11-15 VITALS
HEIGHT: 67 IN | SYSTOLIC BLOOD PRESSURE: 146 MMHG | BODY MASS INDEX: 30.76 KG/M2 | WEIGHT: 196 LBS | DIASTOLIC BLOOD PRESSURE: 77 MMHG | HEART RATE: 63 BPM

## 2022-11-15 DIAGNOSIS — M17.12 PRIMARY OSTEOARTHRITIS OF LEFT KNEE: ICD-10-CM

## 2022-11-15 DIAGNOSIS — M17.11 PRIMARY OSTEOARTHRITIS OF RIGHT KNEE: Primary | ICD-10-CM

## 2023-01-24 ENCOUNTER — TRANSCRIBE ORDERS (OUTPATIENT)
Dept: SLEEP CENTER | Facility: CLINIC | Age: 78
End: 2023-01-24

## 2023-01-24 DIAGNOSIS — G47.33 OBSTRUCTIVE SLEEP APNEA (ADULT) (PEDIATRIC): Primary | ICD-10-CM

## 2023-01-24 DIAGNOSIS — R44.3 HALLUCINATIONS: ICD-10-CM

## 2023-01-25 ENCOUNTER — TELEPHONE (OUTPATIENT)
Dept: SLEEP CENTER | Facility: CLINIC | Age: 78
End: 2023-01-25

## 2023-01-25 NOTE — TELEPHONE ENCOUNTER
----- Message from Avila Taylor MD sent at 1/24/2023  5:20 PM EST -----  Approved    ----- Message -----  From: Monica Cortes  Sent: 4/97/5420   9:18 AM EST  To: Sleep Medicine [place] Provider    # PAPER H&P SCANNED INTO MEDIA#    This Diagnostic sleep study needs approval      If approved please sign and return to clerical pool  If denied please include reasons why  Also provide alternative testing if warranted  Please sign and return to clerical pool

## 2023-01-25 NOTE — TELEPHONE ENCOUNTER
----- Message from Alanis Drew MD sent at 1/24/2023  5:19 PM EST -----  Approved    ----- Message -----  From: Monica Cortes  Sent: 8/68/7161   1:09 PM EST  To: Sleep Medicine [place] Provider    #PAPER H&P SCANNED INTO MEDIA#    This home  sleep study needs approval      If approved please sign and return to clerical pool  If denied please include reasons why  Also provide alternative testing if warranted  Please sign and return to clerical pool

## 2023-02-03 ENCOUNTER — TELEPHONE (OUTPATIENT)
Dept: OBGYN CLINIC | Facility: HOSPITAL | Age: 78
End: 2023-02-03

## 2023-02-03 DIAGNOSIS — M17.11 PRIMARY OSTEOARTHRITIS OF RIGHT KNEE: Primary | ICD-10-CM

## 2023-02-03 DIAGNOSIS — M17.12 PRIMARY OSTEOARTHRITIS OF LEFT KNEE: ICD-10-CM

## 2023-02-03 NOTE — TELEPHONE ENCOUNTER
Caller: patient  Doctor/office: Rl KU#: [phone]      Patient called to start auth/delivery for VISCO/EUFLEXXA/Durolane injections    Body Part: bilat knees  Date of last Gel Injection: 8/2/22    Educated patient on Visco procedure   Medications must first be authorized and delivered to our office BEFORE we can schedule

## 2023-02-20 ENCOUNTER — HOSPITAL ENCOUNTER (OUTPATIENT)
Dept: SLEEP CENTER | Facility: CLINIC | Age: 78
Discharge: HOME | End: 2023-02-20

## 2023-02-20 DIAGNOSIS — R44.3 HALLUCINATIONS: ICD-10-CM

## 2023-02-20 DIAGNOSIS — G47.33 OBSTRUCTIVE SLEEP APNEA (ADULT) (PEDIATRIC): ICD-10-CM

## 2023-02-21 NOTE — PROGRESS NOTES
Sleep Study Documentation    Pre-Sleep Study       Sleep testing procedure explained to patient:YES    Patient napped prior to study:YES- less than 30 minutes  Napped after 2PM: yes    Caffeine:Dayshift worker after 12PM   Caffeine use:NO    Alcohol:Dayshift workers after 5PM: Alcohol use:NO    Typical day for patient:YES       Study Documentation    Sleep Study Indications: G47 33  R44 3    Sleep Study: Diagnostic   Snore: Moderate  Supplemental O2: no    O2 flow rate (L/min) range   O2 flow rate (L/min) final   Minimum SaO2 86  Baseline SaO2 93        Mode of Therapy:    EKG abnormalities: no     EEG abnormalities: no    Sleep Study Recorded < 2 hours: N/A    Sleep Study Recorded > 2 hours but incomplete study: N/A    Sleep Study Recorded 6 hours but no sleep obtained: NO    Patient classification: employed       Post-Sleep Study    Medication used at bedtime or during sleep study:YES other prescription medications    Patient reports time it took to fall asleep:greater than 60 minutes    Patient reports waking up during study:3 or more times  Patient reports returning to sleep in greater than 30 minutes  Patient reports sleeping 2 to 4 hours with dreaming  Patient reports sleep during study:worse than usual    Patient rated sleepiness: Somewhat sleepy or tired    PAP treatment:no

## 2023-03-02 ENCOUNTER — TELEPHONE (OUTPATIENT)
Dept: SLEEP CENTER | Facility: CLINIC | Age: 78
End: 2023-03-02

## 2023-03-23 ENCOUNTER — PROCEDURE VISIT (OUTPATIENT)
Dept: OBGYN CLINIC | Facility: HOSPITAL | Age: 78
End: 2023-03-23

## 2023-03-23 VITALS
WEIGHT: 192 LBS | SYSTOLIC BLOOD PRESSURE: 114 MMHG | DIASTOLIC BLOOD PRESSURE: 74 MMHG | HEIGHT: 67 IN | BODY MASS INDEX: 30.13 KG/M2 | HEART RATE: 80 BPM

## 2023-03-23 DIAGNOSIS — M17.11 PRIMARY OSTEOARTHRITIS OF RIGHT KNEE: ICD-10-CM

## 2023-03-23 DIAGNOSIS — M75.42 IMPINGEMENT SYNDROME OF LEFT SHOULDER: Primary | ICD-10-CM

## 2023-03-23 DIAGNOSIS — M17.12 PRIMARY OSTEOARTHRITIS OF LEFT KNEE: ICD-10-CM

## 2023-03-23 RX ORDER — BETAMETHASONE SODIUM PHOSPHATE AND BETAMETHASONE ACETATE 3; 3 MG/ML; MG/ML
12 INJECTION, SUSPENSION INTRA-ARTICULAR; INTRALESIONAL; INTRAMUSCULAR; SOFT TISSUE
Status: COMPLETED | OUTPATIENT
Start: 2023-03-23 | End: 2023-03-23

## 2023-03-23 RX ORDER — HYALURONATE SODIUM 10 MG/ML
20 SYRINGE (ML) INTRAARTICULAR
Status: COMPLETED | OUTPATIENT
Start: 2023-03-23 | End: 2023-03-23

## 2023-03-23 RX ORDER — BUPIVACAINE HYDROCHLORIDE 2.5 MG/ML
2 INJECTION, SOLUTION INFILTRATION; PERINEURAL
Status: COMPLETED | OUTPATIENT
Start: 2023-03-23 | End: 2023-03-23

## 2023-03-23 RX ORDER — LIDOCAINE HYDROCHLORIDE 10 MG/ML
2 INJECTION, SOLUTION INFILTRATION; PERINEURAL
Status: COMPLETED | OUTPATIENT
Start: 2023-03-23 | End: 2023-03-23

## 2023-03-23 RX ADMIN — LIDOCAINE HYDROCHLORIDE 2 ML: 10 INJECTION, SOLUTION INFILTRATION; PERINEURAL at 15:52

## 2023-03-23 RX ADMIN — Medication 20 MG: at 15:52

## 2023-03-23 RX ADMIN — BUPIVACAINE HYDROCHLORIDE 2 ML: 2.5 INJECTION, SOLUTION INFILTRATION; PERINEURAL at 15:52

## 2023-03-23 RX ADMIN — BETAMETHASONE SODIUM PHOSPHATE AND BETAMETHASONE ACETATE 12 MG: 3; 3 INJECTION, SUSPENSION INTRA-ARTICULAR; INTRALESIONAL; INTRAMUSCULAR; SOFT TISSUE at 15:52

## 2023-03-23 NOTE — PROGRESS NOTES
Assessment:  1  Impingement syndrome of left shoulder        2  Primary osteoarthritis of right knee        3  Primary osteoarthritis of left knee            Plan:  1st bilateral knee Euflexxa and left shoulder impingement  The patient was provided with left subacromial steroid injection and 1st bilateral Euflexxa  The patient tolerated the procedure well  The patient should follow up in one week  To do next visit:  Return in about 1 week (around 3/30/2023) for 2nd bilateral Euflexxa   The above stated was discussed in layman's terms and the patient expressed understanding  All questions were answered to the patient's satisfaction  Scribe Attestation    I,:  Prachi Aguilera am acting as a scribe while in the presence of the attending physician :       I,:  Nino Ortega MD personally performed the services described in this documentation    as scribed in my presence :             Subjective:   Vangie Morrison is a 68 y o  female who presents for follow up of bilateral knees and 1st right knee Euflexxa  Today she complains of bilateral generalized knee pain  Prolonged weight bearing and repetitive bending aggravates while rest alleviates  She has history of bilateral REGGIE  She also complains of left generalized shoulder pain especially with sleep          Review of systems negative unless otherwise specified in HPI    Past Medical History:   Diagnosis Date   • Abnormal Pap smear of cervix    • Basal cell carcinoma (BCC) in situ of skin     on back   • GERD (gastroesophageal reflux disease)    • Hyperlipidemia    • Hypertension    • Incisional hernia    • Uterine polyp        Past Surgical History:   Procedure Laterality Date   • COLOSTOMY  2011    partial, end, distal segment closure   • ENTEROSTOMY CLOSURE      with anastomosis   • EXAMINATION UNDER ANESTHESIA N/A 1/13/2022    Procedure: EXAM UNDER ANESTHESIA (EUA) W/ D&C HYSTEROSCOPY SYMPHION;  Surgeon: Rafaela Michael DO; Location: [facility];  Service: Gynecology   • HERNIA REPAIR  2012/2013/2014   • HERNIA REPAIR      Pt states with a strangled hernia   • HIP SURGERY Right 2010   • HIP SURGERY Left 2011   • INCISIONAL HERNIA REPAIR      Incarcerated   • IA DILATION VAGINA W/ANESTHESIA OTHER THAN LOCAL N/A 12/14/2020    Procedure: EXAM UNDER ANESTHESIA (EUA);   Surgeon: Connor Bhatti DO;  Location: [facility];  Service: Gynecology   • IA HYSTEROSCOPY BX ENDOMETRIUM&/POLYPC W/WO D&C N/A 12/14/2020    Procedure: DILATATION AND CURETTAGE (D&C) WITH HYSTEROSCOPY;  Surgeon: Connor Bhatti DO;  Location: [facility];  Service: Gynecology   • IA HYSTEROSCOPY REMOVAL LEIOMYOMATA N/A 12/14/2020    Procedure: endometrial polypectomy;  Surgeon: Connor Bhatti DO;  Location: [facility];  Service: Gynecology   • TOTAL HIP ARTHROPLASTY Bilateral     2011, 2012       Family History   Problem Relation Age of Onset   • Heart disease Mother    • Coronary artery disease Mother    • Stroke Mother    • Hypertension Mother    • Heart failure Father    • Coronary artery disease Father    • Colon cancer Maternal Uncle 60   • No Known Problems Maternal Grandmother    • No Known Problems Maternal Grandfather    • No Known Problems Paternal Grandmother    • No Known Problems Paternal Grandfather    • No Known Problems Son    • No Known Problems Son        Social History     Occupational History   • Not on file   Tobacco Use   • Smoking status: Former   • Smokeless tobacco: Never   • Tobacco comments:     Pt states with hx 40 years ago   Vaping Use   • Vaping Use: Never used   Substance and Sexual Activity   • Alcohol use: Yes     Comment: weekly, occasional use   • Drug use: Never   • Sexual activity: Not Currently         Current Outpatient Medications:   •  ALPRAZolam (XANAX) 0 25 mg tablet, TAKE 1 TO 2 TABLETS BY MOUTH 30 MINUTES PRIOR TO PROCEDURE, Disp: , Rfl:   •  amLODIPine (NORVASC) 5 mg tablet, Take 5 mg by mouth daily, Disp: , Rfl: 3  • Ascorbic Acid (VITAMIN C PO), Take 1 tablet by mouth daily, Disp: , Rfl:   •  aspirin 81 MG tablet, Take 81 mg by mouth if needed, Disp: , Rfl:   •  B Complex Vitamins (VITAMIN B COMPLEX) TABS, Take 1 tablet by mouth daily, Disp: , Rfl:   •  Cholecalciferol 50 MCG (2000 UT) CAPS, Take by mouth daily, Disp: , Rfl:   •  Coenzyme Q10 (CO Q 10 PO), Take 1 tablet by mouth daily , Disp: , Rfl:   •  escitalopram (LEXAPRO) 5 mg tablet, Take 5 mg by mouth daily, Disp: , Rfl:   •  Probiotic Product (ALIGN PO), Take 1 tablet by mouth daily , Disp: , Rfl:   •  rosuvastatin (CRESTOR) 5 mg tablet, Take 5 mg by mouth every other day, Disp: , Rfl:     No Known Allergies         Vitals:    03/23/23 1524   BP: 114/74   Pulse: 80       Objective:  Physical exam  · General: Awake, Alert, Oriented  · Eyes: Pupils equal, round and reactive to light  · Heart: regular rate and rhythm  · Lungs: No audible wheezing  · Abdomen: soft                    Ortho Exam  Bilateral knees:  No erythema or ecchymosis  No effusion or swelling  Normal strength  Good ROM with crepitus   Calf compartments soft and supple  Sensation intact  Toes are warm sensate and mobile    Left shoulder:  Good ROM  Mild weakness with RTC strength testing  Positive [place]    Diagnostics, reviewed and taken today if performed as documented:    None performed     Procedures, if performed today:    Large joint arthrocentesis: L subacromial bursa  Universal Protocol:  Consent: Verbal consent obtained  Risks and benefits: risks, benefits and alternatives were discussed  Consent given by: patient  Time out: Immediately prior to procedure a "time out" was called to verify the correct patient, procedure, equipment, support staff and site/side marked as required    Timeout called at: 3/23/2023 3:48 PM   Patient understanding: patient states understanding of the procedure being performed  Site marked: the operative site was marked  Patient identity confirmed: verbally with patient    Supporting Documentation  Indications: pain   Procedure Details  Location: shoulder - L subacromial bursa  Needle size: 25 G  Ultrasound guidance: no  Approach: posterolateral  Medications administered: 2 mL bupivacaine 0 25 %; 12 mg betamethasone acetate-betamethasone sodium phosphate 6 (3-3) mg/mL; 2 mL lidocaine 1 %    Patient tolerance: patient tolerated the procedure well with no immediate complications  Dressing:  Sterile dressing applied    Large joint arthrocentesis: R knee  Universal Protocol:  Consent: Verbal consent obtained  Risks and benefits: risks, benefits and alternatives were discussed  Consent given by: patient  Time out: Immediately prior to procedure a "time out" was called to verify the correct patient, procedure, equipment, support staff and site/side marked as required  Timeout called at: 3/23/2023 3:49 PM   Patient understanding: patient states understanding of the procedure being performed  Patient identity confirmed: verbally with patient    Supporting Documentation  Indications: pain   Procedure Details  Location: knee - R knee  Preparation: Patient was prepped and draped in the usual sterile fashion  Needle size: 22 G  Ultrasound guidance: no  Approach: anterolateral  Medications administered: 20 mg Sodium Hyaluronate 20 MG/2ML    Patient tolerance: patient tolerated the procedure well with no immediate complications  Dressing:  Sterile dressing applied    Large joint arthrocentesis: L knee  Universal Protocol:  Consent: Verbal consent obtained  Risks and benefits: risks, benefits and alternatives were discussed  Consent given by: patient  Time out: Immediately prior to procedure a "time out" was called to verify the correct patient, procedure, equipment, support staff and site/side marked as required    Timeout called at: 3/23/2023 3:49 PM   Patient understanding: patient states understanding of the procedure being performed  Patient identity confirmed: verbally with patient    Supporting Documentation  Indications: pain   Procedure Details  Location: knee - L knee  Preparation: Patient was prepped and draped in the usual sterile fashion  Needle size: 22 G  Ultrasound guidance: no  Approach: anterolateral  Medications administered: 20 mg Sodium Hyaluronate 20 MG/2ML    Patient tolerance: patient tolerated the procedure well with no immediate complications  Dressing:  Sterile dressing applied            Portions of the record may have been created with voice recognition software  Occasional wrong word or "sound a like" substitutions may have occurred due to the inherent limitations of voice recognition software  Read the chart carefully and recognize, using context, where substitutions have occurred

## 2023-03-30 ENCOUNTER — PROCEDURE VISIT (OUTPATIENT)
Dept: OBGYN CLINIC | Facility: HOSPITAL | Age: 78
End: 2023-03-30

## 2023-03-30 VITALS
SYSTOLIC BLOOD PRESSURE: 134 MMHG | BODY MASS INDEX: 30.07 KG/M2 | DIASTOLIC BLOOD PRESSURE: 81 MMHG | HEART RATE: 79 BPM | HEIGHT: 67 IN

## 2023-03-30 DIAGNOSIS — M17.12 PRIMARY OSTEOARTHRITIS OF LEFT KNEE: ICD-10-CM

## 2023-03-30 DIAGNOSIS — M25.562 CHRONIC PAIN OF LEFT KNEE: Primary | ICD-10-CM

## 2023-03-30 DIAGNOSIS — M25.561 CHRONIC PAIN OF BOTH KNEES: ICD-10-CM

## 2023-03-30 DIAGNOSIS — G89.29 CHRONIC PAIN OF LEFT KNEE: Primary | ICD-10-CM

## 2023-03-30 DIAGNOSIS — M25.562 CHRONIC PAIN OF BOTH KNEES: ICD-10-CM

## 2023-03-30 DIAGNOSIS — G89.29 CHRONIC PAIN OF BOTH KNEES: ICD-10-CM

## 2023-03-30 DIAGNOSIS — S80.01XA CONTUSION OF RIGHT KNEE, INITIAL ENCOUNTER: ICD-10-CM

## 2023-03-30 DIAGNOSIS — M17.11 PRIMARY OSTEOARTHRITIS OF RIGHT KNEE: ICD-10-CM

## 2023-03-30 RX ORDER — HYALURONATE SODIUM 10 MG/ML
20 SYRINGE (ML) INTRAARTICULAR
Status: COMPLETED | OUTPATIENT
Start: 2023-03-30 | End: 2023-03-30

## 2023-03-30 RX ADMIN — Medication 20 MG: at 16:13

## 2023-03-30 RX ADMIN — Medication 20 MG: at 16:14

## 2023-03-30 NOTE — PROGRESS NOTES
68 y o female presents for ongoing viscosupplementation of both knees    Review of Systems  Review of systems negative unless otherwise specified in HPI    Past Medical History  Past Medical History:   Diagnosis Date   • Abnormal Pap smear of cervix    • Basal cell carcinoma (BCC) in situ of skin     on back   • GERD (gastroesophageal reflux disease)    • Hyperlipidemia    • Hypertension    • Incisional hernia    • Uterine polyp        Past Surgical History  Past Surgical History:   Procedure Laterality Date   • COLOSTOMY  2011    partial, end, distal segment closure   • ENTEROSTOMY CLOSURE      with anastomosis   • EXAMINATION UNDER ANESTHESIA N/A 1/13/2022    Procedure: EXAM UNDER ANESTHESIA (EUA) W/ D&C HYSTEROSCOPY SYMPHION;  Surgeon: Roselyn Funez DO;  [facility];  Service: Gynecology   • HERNIA REPAIR  2012/2013/2014   • HERNIA REPAIR      Pt states with a strangled hernia   • HIP SURGERY Right 2010   • HIP SURGERY Left 2011   • INCISIONAL HERNIA REPAIR      Incarcerated   • [place] DILATION VAGINA W/ANESTHESIA OTHER THAN LOCAL N/A 12/14/2020    Procedure: EXAM UNDER ANESTHESIA (EUA);   Surgeon: Roselyn Funez DO;  [facility];  Service: Gynecology   • [place] HYSTEROSCOPY BX ENDOMETRIUM&/POLYPC W/WO D&C N/A 12/14/2020    Procedure: DILATATION AND CURETTAGE (D&C) WITH HYSTEROSCOPY;  Surgeon: Roselyn Funez DO;  [facility];  Service: Gynecology   • [place] HYSTEROSCOPY REMOVAL LEIOMYOMATA N/A 12/14/2020    Procedure: endometrial polypectomy;  Surgeon: Roselyn Funez DO;  [facility];  Service: Gynecology   • TOTAL HIP ARTHROPLASTY Bilateral     2011, 2012       Current Medications  Current Outpatient Medications on File Prior to Visit   Medication Sig Dispense Refill   • ALPRAZolam (XANAX) 0 25 mg tablet TAKE 1 TO 2 TABLETS BY MOUTH 30 MINUTES PRIOR TO PROCEDURE     • amLODIPine (NORVASC) 5 mg tablet Take 5 mg by mouth daily  3   • Ascorbic Acid (VITAMIN C PO) Take 1 tablet by mouth daily     • aspirin 81 MG tablet Take 81 mg by mouth if needed     • B Complex Vitamins (VITAMIN B COMPLEX) TABS Take 1 tablet by mouth daily     • Cholecalciferol 50 MCG (2000 UT) CAPS Take by mouth daily     • Coenzyme Q10 (CO Q 10 PO) Take 1 tablet by mouth daily      • escitalopram (LEXAPRO) 5 mg tablet Take 5 mg by mouth daily     • Probiotic Product (ALIGN PO) Take 1 tablet by mouth daily      • rosuvastatin (CRESTOR) 5 mg tablet Take 5 mg by mouth every other day       No current facility-administered medications on file prior to visit  Recent Labs [facility])  0   Lab Value Date/Time    HCT 44 4 07/02/2021 2010    HCT 38 9 03/16/2015 0917    HGB 14 7 07/02/2021 2010    HGB 13 0 03/16/2015 0917    WBC 7 99 07/02/2021 2010    WBC 6 14 03/16/2015 0917    GLUCOSE 109 03/16/2015 0917    HGBA1C 5 3 04/19/2019 1210         Physical exam  · General: Awake, Alert, Oriented  · Eyes: Pupils equal, round and reactive to light  · Heart: regular rate and rhythm  · Lungs: No audible wheezing  · Abdomen: soft  Examination finds neither knee effused, neither knee is erythema    Imaging  Injections of viscosupplementation provided her bilateral knee joints    They are documented below      Large joint arthrocentesis: R knee  Universal Protocol:  Consent given by: patient    Supporting Documentation  Indications: pain   Procedure Details  Location: knee - R knee  Needle size: 22 G  Ultrasound guidance: no  Medications administered: 20 mg Sodium Hyaluronate 20 MG/2ML    Patient tolerance: patient tolerated the procedure well with no immediate complications  Dressing:  Sterile dressing applied    Large joint arthrocentesis: L knee  Universal Protocol:  Consent given by: patient    Supporting Documentation  Indications: pain   Procedure Details  Location: knee - L knee  Needle size: 22 G  Medications administered: 20 mg Sodium Hyaluronate 20 MG/2ML    Patient tolerance: patient tolerated the procedure well with no immediate complications  Dressing:  Sterile dressing applied            Procedure  See above    Assessment/Plan:   68 y  o female who requires ongoing viscosupplementation  It is advised, accepted, administer as outlined above    Office follow-up in 1 week time is my recommendation for completion of a 3 shot series of viscosupplementation both knees

## 2023-04-06 ENCOUNTER — PROCEDURE VISIT (OUTPATIENT)
Dept: OBGYN CLINIC | Facility: HOSPITAL | Age: 78
End: 2023-04-06

## 2023-04-06 VITALS
HEIGHT: 67 IN | HEART RATE: 76 BPM | SYSTOLIC BLOOD PRESSURE: 123 MMHG | BODY MASS INDEX: 30.07 KG/M2 | DIASTOLIC BLOOD PRESSURE: 79 MMHG

## 2023-04-06 DIAGNOSIS — M25.562 CHRONIC PAIN OF BOTH KNEES: ICD-10-CM

## 2023-04-06 DIAGNOSIS — M25.561 CHRONIC PAIN OF BOTH KNEES: ICD-10-CM

## 2023-04-06 DIAGNOSIS — G89.29 CHRONIC PAIN OF BOTH KNEES: ICD-10-CM

## 2023-04-06 DIAGNOSIS — M17.0 PRIMARY OSTEOARTHRITIS OF BOTH KNEES: Primary | ICD-10-CM

## 2023-04-06 RX ORDER — HYALURONATE SODIUM 10 MG/ML
20 SYRINGE (ML) INTRAARTICULAR
Status: COMPLETED | OUTPATIENT
Start: 2023-04-06 | End: 2023-04-06

## 2023-04-06 RX ADMIN — Medication 20 MG: at 15:31

## 2023-04-06 NOTE — PROGRESS NOTES
Assessment:   Diagnosis ICD-10-CM Associated Orders   1  Primary osteoarthritis of both knees  M17 0 Large joint arthrocentesis: bilateral knee      2  Chronic pain of both knees  M25 561 Large joint arthrocentesis: bilateral knee    M25 562     G89 29           Plan:  • Bilateral knees known osteoarthritis  Both of her knees were injected with the 3rd and final Euflexxa visco injections today  • She tolerated both injections well  • Ice and post-injection protocol advised  • WBAt    To do next visit:  Return in about 3 months (around 7/6/2023) for re-check  The above stated was discussed in layman's terms and the patient expressed understanding  All questions were answered to the patient's satisfaction  Scribe Attestation    I,:  Nela Farmer am acting as a scribe while in the presence of the attending physician :       I,:  Natalie Camilo MD personally performed the services described in this documentation    as scribed in my presence :             Subjective:   Sarah Bourgeois is a 68 y o  female who presents today for repeat evaluation of her bilateral knees, known OA  She returns today for her 3rd and final euflexxa visco injection to both knees today  She has less pain and stiffness following last week's 2nd injection         Review of systems negative unless otherwise specified in HPI  Review of Systems    Past Medical History:   Diagnosis Date   • Abnormal Pap smear of cervix    • Basal cell carcinoma (BCC) in situ of skin     on back   • GERD (gastroesophageal reflux disease)    • Hyperlipidemia    • Hypertension    • Incisional hernia    • Uterine polyp        Past Surgical History:   Procedure Laterality Date   • COLOSTOMY  2011    partial, end, distal segment closure   • ENTEROSTOMY CLOSURE      with anastomosis   • EXAMINATION UNDER ANESTHESIA N/A 1/13/2022    Procedure: EXAM UNDER ANESTHESIA (EUA) W/ D&C HYSTEROSCOPY SYMPHION;  Surgeon: Corey Joyce DO;  [facility]; Service: Gynecology   • HERNIA REPAIR  2012/2013/2014   • HERNIA REPAIR      Pt states with a strangled hernia   • HIP SURGERY Right 2010   • HIP SURGERY Left 2011   • INCISIONAL HERNIA REPAIR      Incarcerated   • [place] DILATION VAGINA W/ANESTHESIA OTHER THAN LOCAL N/A 12/14/2020    Procedure: EXAM UNDER ANESTHESIA (EUA);   Surgeon: Sujey Flor DO;  [facility];  Service: Gynecology   • [place] HYSTEROSCOPY BX ENDOMETRIUM&/POLYPC W/WO D&C N/A 12/14/2020    Procedure: DILATATION AND CURETTAGE (D&C) WITH HYSTEROSCOPY;  Surgeon: Sujey Flor DO;  [facility];  Service: Gynecology   • [place] HYSTEROSCOPY REMOVAL LEIOMYOMATA N/A 12/14/2020    Procedure: endometrial polypectomy;  Surgeon: Sujey Flor DO;  [facility];  Service: Gynecology   • TOTAL HIP ARTHROPLASTY Bilateral     2011, 2012       Family History   Problem Relation Age of Onset   • Heart disease Mother    • Coronary artery disease Mother    • Stroke Mother    • Hypertension Mother    • Heart failure Father    • Coronary artery disease Father    • Colon cancer Maternal Uncle 60   • No Known Problems Maternal Grandmother    • No Known Problems Maternal Grandfather    • No Known Problems Paternal Grandmother    • No Known Problems Paternal Grandfather    • No Known Problems Son    • No Known Problems Son        Social History     Occupational History   • Not on file   Tobacco Use   • Smoking status: Former   • Smokeless tobacco: Never   • Tobacco comments:     Pt states with hx 40 years ago   Vaping Use   • Vaping Use: Never used   Substance and Sexual Activity   • Alcohol use: Yes     Comment: weekly, occasional use   • Drug use: Never   • Sexual activity: Not Currently         Current Outpatient Medications:   •  ALPRAZolam (XANAX) 0 25 mg tablet, TAKE 1 TO 2 TABLETS BY MOUTH 30 MINUTES PRIOR TO PROCEDURE, Disp: , Rfl:   •  amLODIPine (NORVASC) 5 mg tablet, Take 5 mg by mouth daily, Disp: , Rfl: 3  •  Ascorbic Acid (VITAMIN C "PO), Take 1 tablet by mouth daily, Disp: , Rfl:   •  aspirin 81 MG tablet, Take 81 mg by mouth if needed, Disp: , Rfl:   •  B Complex Vitamins (VITAMIN B COMPLEX) TABS, Take 1 tablet by mouth daily, Disp: , Rfl:   •  Cholecalciferol 50 MCG (2000 UT) CAPS, Take by mouth daily, Disp: , Rfl:   •  Coenzyme Q10 (CO Q 10 PO), Take 1 tablet by mouth daily , Disp: , Rfl:   •  escitalopram (LEXAPRO) 5 mg tablet, Take 5 mg by mouth daily, Disp: , Rfl:   •  Probiotic Product (ALIGN PO), Take 1 tablet by mouth daily , Disp: , Rfl:   •  rosuvastatin (CRESTOR) 5 mg tablet, Take 5 mg by mouth every other day, Disp: , Rfl:     No Known Allergies         Vitals:    04/06/23 1518   BP: 123/79   Pulse: 76       Objective:                    Right Knee Exam     Muscle Strength   The patient has normal right knee strength  Tenderness   The patient is experiencing tenderness in the medial joint line  Range of Motion   Extension: 0   Flexion: 120 (with less crepitation and less stiffness)     Other   Erythema: absent  Sensation: normal  Swelling: mild  Effusion: no effusion present      Left Knee Exam     Muscle Strength   The patient has normal left knee strength  Tenderness   The patient is experiencing tenderness in the medial joint line  Range of Motion   Extension: 0   Flexion: 120 (with less crepitation and less stiffness)     Other   Erythema: absent  Sensation: normal  Swelling: mild  Effusion: no effusion present    Comments: Both knees are in mild varus alignment            Diagnostics, reviewed and taken today if performed as documented:    None performed            Procedures, if performed today:    Large joint arthrocentesis: bilateral knee  Universal Protocol:  Consent: Verbal consent obtained    Risks and benefits: risks, benefits and alternatives were discussed  Consent given by: patient  Time out: Immediately prior to procedure a \"time out\" was called to verify the correct patient, procedure, equipment, " "support staff and site/side marked as required  Timeout called at: 4/6/2023 3:28 PM   Patient understanding: patient states understanding of the procedure being performed  Site marked: the operative site was marked  Patient identity confirmed: verbally with patient    Supporting Documentation  Indications: pain and diagnostic evaluation   Procedure Details  Location: knee - bilateral knee  Preparation: Patient was prepped and draped in the usual sterile fashion  Needle size: 22 G  Ultrasound guidance: no  Approach: anterolateral    Medications (Right): 20 mg Sodium Hyaluronate 20 MG/2MLMedications (Left): 20 mg Sodium Hyaluronate 20 MG/2ML   Patient tolerance: patient tolerated the procedure well with no immediate complications  Dressing:  Sterile dressing applied          Portions of the record may have been created with voice recognition software  Occasional wrong word or \"sound a like\" substitutions may have occurred due to the inherent limitations of voice recognition software  Read the chart carefully and recognize, using context, where substitutions have occurred    "

## 2023-04-20 ENCOUNTER — APPOINTMENT (OUTPATIENT)
Dept: LAB | Facility: CLINIC | Age: 78
End: 2023-04-20

## 2023-04-20 DIAGNOSIS — G44.85 PRIMARY STABBING HEADACHE: ICD-10-CM

## 2023-04-20 DIAGNOSIS — G43.109 MIGRAINE AURA WITHOUT HEADACHE (MIGRAINE EQUIVALENTS): ICD-10-CM

## 2023-04-20 DIAGNOSIS — G44.85 STABBING HEADACHE: ICD-10-CM

## 2023-04-20 LAB
ANA SER QL IA: NEGATIVE
TSH SERPL DL<=0.05 MIU/L-ACNC: 1.03 UIU/ML (ref 0.45–4.5)

## 2023-04-21 LAB
ACE SERPL-CCNC: 34 U/L (ref 14–82)
ALBUMIN SERPL ELPH-MCNC: 3.85 G/DL (ref 3.5–5)
ALBUMIN SERPL ELPH-MCNC: 56.6 % (ref 52–65)
ALPHA1 GLOB SERPL ELPH-MCNC: 0.33 G/DL (ref 0.1–0.4)
ALPHA1 GLOB SERPL ELPH-MCNC: 4.9 % (ref 2.5–5)
ALPHA2 GLOB SERPL ELPH-MCNC: 0.84 G/DL (ref 0.4–1.2)
ALPHA2 GLOB SERPL ELPH-MCNC: 12.4 % (ref 7–13)
BETA GLOB ABNORMAL SERPL ELPH-MCNC: 0.4 G/DL (ref 0.4–0.8)
BETA1 GLOB SERPL ELPH-MCNC: 5.9 % (ref 5–13)
BETA2 GLOB SERPL ELPH-MCNC: 5.2 % (ref 2–8)
BETA2+GAMMA GLOB SERPL ELPH-MCNC: 0.35 G/DL (ref 0.2–0.5)
ENA SS-A AB SER-ACNC: <0.2 AI (ref 0–0.9)
ENA SS-B AB SER-ACNC: <0.2 AI (ref 0–0.9)
GAMMA GLOB ABNORMAL SERPL ELPH-MCNC: 1.02 G/DL (ref 0.5–1.6)
GAMMA GLOB SERPL ELPH-MCNC: 15 % (ref 12–22)
IGG/ALB SER: 1.3 {RATIO} (ref 1.1–1.8)
PROT PATTERN SERPL ELPH-IMP: NORMAL
PROT SERPL-MCNC: 6.8 G/DL (ref 6.4–8.2)
RHEUMATOID FACT SER QL LA: NEGATIVE

## 2023-06-05 ENCOUNTER — ANNUAL EXAM (OUTPATIENT)
Dept: OBGYN CLINIC | Facility: CLINIC | Age: 78
End: 2023-06-05
Payer: COMMERCIAL

## 2023-06-05 VITALS
HEIGHT: 67 IN | WEIGHT: 195.2 LBS | DIASTOLIC BLOOD PRESSURE: 76 MMHG | SYSTOLIC BLOOD PRESSURE: 130 MMHG | BODY MASS INDEX: 30.64 KG/M2

## 2023-06-05 DIAGNOSIS — Z01.419 ENCOUNTER FOR ANNUAL ROUTINE GYNECOLOGICAL EXAMINATION: Primary | ICD-10-CM

## 2023-06-05 DIAGNOSIS — Z12.31 ENCOUNTER FOR SCREENING MAMMOGRAM FOR BREAST CANCER: ICD-10-CM

## 2023-06-05 DIAGNOSIS — M85.80 OSTEOPENIA, UNSPECIFIED LOCATION: ICD-10-CM

## 2023-06-05 DIAGNOSIS — N83.202 CYST OF LEFT OVARY: ICD-10-CM

## 2023-06-05 PROCEDURE — G0101 CA SCREEN;PELVIC/BREAST EXAM: HCPCS | Performed by: OBSTETRICS & GYNECOLOGY

## 2023-06-05 NOTE — PROGRESS NOTES
Assessment/Plan:  Pap smear deferred due to low risk status  Encourage self breast examination as well as calcium supplementation  Continue annual mammogram   Recommend DEXA scan follow-up osteopenia  Reviewed colon cancer screening, up-to-date  She will continue to follow-up with primary care as scheduled  Return to office in 1 year follow-up left ovarian cyst   No problem-specific Assessment & Plan notes found for this encounter  Diagnoses and all orders for this visit:    Encounter for annual routine gynecological examination    Encounter for screening mammogram for breast cancer  -     Mammo screening bilateral w 3d & cad; Future    Osteopenia, unspecified location  -     DXA bone density spine hip and pelvis; Future    Cyst of left ovary          Subjective:      Patient ID: Andrzej Longoria is a 68 y o  female  HPI     This is a very pleasant 80-year-old female P2 ( x2) presents for GYN exam   She went through menopause at age 37  She has never been on hormone replacement therapy  She denies any vaginal bleeding or spotting  No changes in bowel or bladder function  Patient has not been sexually active in over 14 years  She is  approximately 22 years  She complains of increased fatigue and joint pain  She does follow-up with her family physician on a regular basis  She has a history of small left ovarian cyst measuring 2 4 x 1 5 cm  Patient is asymptomatic  She underwent pelvic ultrasound last year with incidental finding of 16 mm endometrial stripe with no vaginal bleeding  Ultimately this was her second episode of endometrial polyps, benign  Colonoscopy   DEXA scan  osteopenia      She continues to work 2 part-time jobs      The following portions of the patient's history were reviewed and updated as appropriate: allergies, current medications, past family history, past medical history, past social history, past surgical history and problem list     Review "of Systems   Constitutional: Negative for fatigue, fever and unexpected weight change  Respiratory: Negative for cough, chest tightness, shortness of breath and wheezing  Cardiovascular: Negative  Negative for chest pain and palpitations  Gastrointestinal: Negative  Negative for abdominal distention, abdominal pain, blood in stool, constipation, diarrhea, nausea and vomiting  Genitourinary: Negative  Negative for difficulty urinating, dyspareunia, dysuria, flank pain, frequency, genital sores, hematuria, pelvic pain, urgency, vaginal bleeding, vaginal discharge and vaginal pain  Skin: Negative for rash  Objective:      /76   Ht 5' 7\" (1 702 m)   Wt 88 5 kg (195 lb 3 2 oz)   BMI 30 57 kg/m²          Physical Exam  Constitutional:       Appearance: Normal appearance  She is well-developed  Cardiovascular:      Rate and Rhythm: Normal rate and regular rhythm  Pulmonary:      Effort: Pulmonary effort is normal       Breath sounds: Normal breath sounds  Chest:   Breasts:     Right: No inverted nipple, mass, nipple discharge, skin change or tenderness  Left: No inverted nipple, mass, nipple discharge, skin change or tenderness  Abdominal:      General: Bowel sounds are normal  There is no distension  Palpations: Abdomen is soft  Tenderness: There is no abdominal tenderness  There is no guarding or rebound  Genitourinary:     Labia:         Right: No rash, tenderness or lesion  Left: No rash, tenderness or lesion  Vagina: Normal  No signs of injury  No vaginal discharge or tenderness  Cervix: No cervical motion tenderness, discharge, friability, lesion, erythema or cervical bleeding  Uterus: Not enlarged and not tender  Adnexa:         Right: No mass, tenderness or fullness  Left: No mass, tenderness or fullness  Neurological:      Mental Status: She is alert and oriented to person, place, and time     Psychiatric:         " Behavior: Behavior normal

## 2023-07-13 ENCOUNTER — OFFICE VISIT (OUTPATIENT)
Dept: OBGYN CLINIC | Facility: HOSPITAL | Age: 78
End: 2023-07-13
Payer: COMMERCIAL

## 2023-07-13 VITALS
BODY MASS INDEX: 30.13 KG/M2 | WEIGHT: 192 LBS | HEART RATE: 66 BPM | HEIGHT: 67 IN | SYSTOLIC BLOOD PRESSURE: 121 MMHG | DIASTOLIC BLOOD PRESSURE: 74 MMHG

## 2023-07-13 DIAGNOSIS — G89.29 CHRONIC PAIN OF BOTH KNEES: Primary | ICD-10-CM

## 2023-07-13 DIAGNOSIS — M17.0 PRIMARY OSTEOARTHRITIS OF BOTH KNEES: ICD-10-CM

## 2023-07-13 DIAGNOSIS — M25.562 CHRONIC PAIN OF BOTH KNEES: Primary | ICD-10-CM

## 2023-07-13 DIAGNOSIS — M25.561 CHRONIC PAIN OF BOTH KNEES: Primary | ICD-10-CM

## 2023-07-13 PROCEDURE — 99213 OFFICE O/P EST LOW 20 MIN: CPT | Performed by: ORTHOPAEDIC SURGERY

## 2023-07-13 RX ORDER — TOBRAMYCIN AND DEXAMETHASONE 3; 1 MG/ML; MG/ML
SUSPENSION/ DROPS OPHTHALMIC
COMMUNITY
Start: 2023-06-19

## 2023-07-13 NOTE — PROGRESS NOTES
Assessment:  1. Chronic pain of both knees  Injection procedure prior authorization      2. Primary osteoarthritis of both knees  Injection procedure prior authorization          Plan:  Bilateral knee osteoarthritis. Bilateral knee visco-supplement was ordered as this had provided significant decrease of pain, inflammation and crepitus with last application. The patient has on-going knee pain and stiffness in which interferes with their daily activity and sleep. Examination of knee includes crepitus with range of motion. The patient rates their pain a 10/10 at times. The patient has tried home exercise program learned from past physical therapy. Bracing has not provided relief. The patient has tried oral medications and steroid injections with limited benefit. The patient has been counseled on weight loss. To do next visit:  Return in about 3 months (around 10/13/2023) for visco-supplementation. The above stated was discussed in layman's terms and the patient expressed understanding. All questions were answered to the patient's satisfaction. Scribe Attestation    I,:  Nam Hensley am acting as a scribe while in the presence of the attending physician.:       I,:  Mayra Lim MD personally performed the services described in this documentation    as scribed in my presence.:             Subjective:   Elizabet Ayers is a 68 y.o. female who presents for follow up of bilateral knees. She is s/p bilateral Euflexxa injections with benefit, 4/6/2023. Today she complains of bilateral generalized knee pain. Prolonged weight bearing and repetitive bending aggravates while rest alleviates.         Review of systems negative unless otherwise specified in HPI    Past Medical History:   Diagnosis Date   • Basal cell carcinoma (BCC) in situ of skin     on back   • GERD (gastroesophageal reflux disease)    • Hyperlipidemia    • Hypertension    • Incisional hernia    • Uterine polyp Past Surgical History:   Procedure Laterality Date   • COLOSTOMY  2011    partial, end, distal segment closure   • ENTEROSTOMY CLOSURE      with anastomosis   • EXAMINATION UNDER ANESTHESIA N/A 1/13/2022    Procedure: EXAM UNDER ANESTHESIA (EUA) W/ D&C HYSTEROSCOPY SYMPHION;  Surgeon: Berny Caicedo DO;  [facility];  Service: Gynecology   • HERNIA REPAIR  2012/2013/2014   • HERNIA REPAIR      Pt states with a strangled hernia   • HIP SURGERY Right 2010   • HIP SURGERY Left 2011   • [address]      Incarcerated   • [place] DILATION VAGINA W/ANESTHESIA OTHER THAN LOCAL N/A 12/14/2020    Procedure: EXAM UNDER ANESTHESIA (EUA);   Surgeon: Berny Caicedo DO;  [facility];  Service: Gynecology   • [place] HYSTEROSCOPY BX ENDOMETRIUM&/POLYPC W/WO D&C N/A 12/14/2020    Procedure: DILATATION AND CURETTAGE (D&C) WITH HYSTEROSCOPY;  Surgeon: Berny Caicedo DO;  [facility];  Service: Gynecology   • [place] HYSTEROSCOPY REMOVAL LEIOMYOMATA N/A 12/14/2020    Procedure: endometrial polypectomy;  Surgeon: Berny Caicedo DO;  [facility];  Service: Gynecology   • TOTAL HIP ARTHROPLASTY Bilateral     2011, 2012       Family History   Problem Relation Age of Onset   • Heart disease Mother    • Coronary artery disease Mother    • Stroke Mother    • Hypertension Mother    • Heart failure Father    • Coronary artery disease Father    • Colon cancer Maternal Uncle 60   • No Known Problems Maternal Grandmother    • No Known Problems Maternal Grandfather    • No Known Problems Paternal Grandmother    • No Known Problems Paternal Grandfather    • No Known Problems Son    • No Known Problems Son        Social History     Occupational History   • Not on file   Tobacco Use   • Smoking status: Former   • Smokeless tobacco: Never   • Tobacco comments:     Pt states with hx 40 years ago   Vaping Use   • Vaping Use: Never used   Substance and Sexual Activity   • Alcohol use: Yes     Comment: weekly, occasional use   • Drug use: Never   • Sexual activity: Not Currently         Current Outpatient Medications:   •  ALPRAZolam (XANAX) 0.25 mg tablet, TAKE 1 TO 2 TABLETS BY MOUTH 30 MINUTES PRIOR TO PROCEDURE (Patient not taking: Reported on 6/5/2023), Disp: , Rfl:   •  amLODIPine (NORVASC) 5 mg tablet, Take 5 mg by mouth daily, Disp: , Rfl: 3  •  Ascorbic Acid (VITAMIN C PO), Take 1 tablet by mouth daily, Disp: , Rfl:   •  aspirin 81 MG tablet, Take 81 mg by mouth if needed (Patient not taking: Reported on 6/5/2023), Disp: , Rfl:   •  B Complex Vitamins (VITAMIN B COMPLEX) TABS, Take 1 tablet by mouth daily, Disp: , Rfl:   •  Cholecalciferol 50 MCG (2000 UT) CAPS, Take by mouth daily, Disp: , Rfl:   •  Coenzyme Q10 (CO Q 10 PO), Take 1 tablet by mouth daily , Disp: , Rfl:   •  escitalopram (LEXAPRO) 5 mg tablet, Take 5 mg by mouth daily, Disp: , Rfl:   •  Probiotic Product (ALIGN PO), Take 1 tablet by mouth daily , Disp: , Rfl:   •  rosuvastatin (CRESTOR) 5 mg tablet, Take 5 mg by mouth every other day, Disp: , Rfl:   •  tobramycin-dexamethasone (TOBRADEX) ophthalmic suspension, ONE DROP BOTH EYES 4 TIMES A DAY X 1 WEEK, Disp: , Rfl:     No Known Allergies         Vitals:    07/13/23 1523   BP: 121/74   Pulse: 66       Objective:  Physical exam  · General: Awake, Alert, Oriented  · Eyes: Pupils equal, round and reactive to light  · Heart: regular rate and rhythm  · Lungs: No audible wheezing  · Abdomen: soft                    Ortho Exam  Bilateral knee:  No erythema or ecchymosis  No effusion or swelling  Normal strength  Good ROM with crepitus   Calf compartments soft and supple  Sensation intact  Toes are warm sensate and mobile      Diagnostics, reviewed and taken today if performed as documented:    None performed    Procedures, if performed today:    Procedures    None performed      Portions of the record may have been created with voice recognition software.   Occasional wrong word or "sound a like" substitutions may have occurred due to the inherent limitations of voice recognition software. Read the chart carefully and recognize, using context, where substitutions have occurred.

## 2023-08-10 ENCOUNTER — HOSPITAL ENCOUNTER (OUTPATIENT)
Dept: RADIOLOGY | Age: 78
Discharge: HOME | End: 2023-08-10
Payer: COMMERCIAL

## 2023-08-10 VITALS — HEIGHT: 67 IN | WEIGHT: 191 LBS | BODY MASS INDEX: 29.98 KG/M2

## 2023-08-10 DIAGNOSIS — Z12.31 ENCOUNTER FOR SCREENING MAMMOGRAM FOR BREAST CANCER: ICD-10-CM

## 2023-08-10 PROCEDURE — 77067 SCR MAMMO BI INCL CAD: CPT

## 2023-08-10 PROCEDURE — 77063 BREAST TOMOSYNTHESIS BI: CPT

## 2023-09-11 ENCOUNTER — TELEPHONE (OUTPATIENT)
Dept: OBGYN CLINIC | Facility: HOSPITAL | Age: 78
End: 2023-09-11

## 2023-10-24 ENCOUNTER — PROCEDURE VISIT (OUTPATIENT)
Dept: OBGYN CLINIC | Facility: HOSPITAL | Age: 78
End: 2023-10-24
Payer: COMMERCIAL

## 2023-10-24 VITALS
DIASTOLIC BLOOD PRESSURE: 84 MMHG | BODY MASS INDEX: 30.92 KG/M2 | WEIGHT: 197 LBS | HEART RATE: 73 BPM | HEIGHT: 67 IN | SYSTOLIC BLOOD PRESSURE: 130 MMHG

## 2023-10-24 DIAGNOSIS — M25.562 CHRONIC PAIN OF BOTH KNEES: Primary | ICD-10-CM

## 2023-10-24 DIAGNOSIS — G89.29 CHRONIC PAIN OF BOTH KNEES: Primary | ICD-10-CM

## 2023-10-24 DIAGNOSIS — M17.0 PRIMARY OSTEOARTHRITIS OF BOTH KNEES: ICD-10-CM

## 2023-10-24 DIAGNOSIS — M25.561 CHRONIC PAIN OF BOTH KNEES: Primary | ICD-10-CM

## 2023-10-24 DIAGNOSIS — M75.42 IMPINGEMENT SYNDROME OF LEFT SHOULDER: ICD-10-CM

## 2023-10-24 PROCEDURE — 99213 OFFICE O/P EST LOW 20 MIN: CPT | Performed by: ORTHOPAEDIC SURGERY

## 2023-10-24 PROCEDURE — 20610 DRAIN/INJ JOINT/BURSA W/O US: CPT | Performed by: ORTHOPAEDIC SURGERY

## 2023-10-24 RX ORDER — BUPIVACAINE HYDROCHLORIDE 2.5 MG/ML
2 INJECTION, SOLUTION INFILTRATION; PERINEURAL
Status: COMPLETED | OUTPATIENT
Start: 2023-10-24 | End: 2023-10-24

## 2023-10-24 RX ORDER — LIDOCAINE HYDROCHLORIDE 10 MG/ML
2 INJECTION, SOLUTION INFILTRATION; PERINEURAL
Status: COMPLETED | OUTPATIENT
Start: 2023-10-24 | End: 2023-10-24

## 2023-10-24 RX ORDER — BETAMETHASONE SODIUM PHOSPHATE AND BETAMETHASONE ACETATE 3; 3 MG/ML; MG/ML
12 INJECTION, SUSPENSION INTRA-ARTICULAR; INTRALESIONAL; INTRAMUSCULAR; SOFT TISSUE
Status: COMPLETED | OUTPATIENT
Start: 2023-10-24 | End: 2023-10-24

## 2023-10-24 RX ORDER — HYALURONATE SODIUM 10 MG/ML
20 SYRINGE (ML) INTRAARTICULAR
Status: COMPLETED | OUTPATIENT
Start: 2023-10-24 | End: 2023-10-24

## 2023-10-24 RX ADMIN — Medication 20 MG: at 07:45

## 2023-10-24 RX ADMIN — LIDOCAINE HYDROCHLORIDE 2 ML: 10 INJECTION, SOLUTION INFILTRATION; PERINEURAL at 07:45

## 2023-10-24 RX ADMIN — BETAMETHASONE SODIUM PHOSPHATE AND BETAMETHASONE ACETATE 12 MG: 3; 3 INJECTION, SUSPENSION INTRA-ARTICULAR; INTRALESIONAL; INTRAMUSCULAR; SOFT TISSUE at 07:45

## 2023-10-24 RX ADMIN — BUPIVACAINE HYDROCHLORIDE 2 ML: 2.5 INJECTION, SOLUTION INFILTRATION; PERINEURAL at 07:45

## 2023-10-24 NOTE — PROGRESS NOTES
Assessment:  1. Chronic pain of both knees        2. Primary osteoarthritis of both knees        3. Impingement syndrome of left shoulder            Plan:  1ST Bilateral Euflexxa  The patient was provided with 1st bilateral Euflexxa injections. The patient tolerated the procedure well. Left shoulder subacromial impingement   The patient was provided with left subacromial steroid injection. The patient tolerated the procedure well. The patient should follow up in one week for 2nd bilateral Euflexxa      To do next visit:  Return in about 1 week (around 10/31/2023). The above stated was discussed in layman's terms and the patient expressed understanding. All questions were answered to the patient's satisfaction. Scribe Attestation      I,:  Valery Castillo am acting as a scribe while in the presence of the attending physician.:       I,:  Ariel Jay MD personally performed the services described in this documentation    as scribed in my presence.:               Subjective:   Sona Howard is a 68 y.o. female who presents for follow up of bilateral knees and left shoulder. Today she complains of left > right generalized knee pain and left posterior shoulder pain. Reaching with left arm and prolonged weight bearing aggravates while rest alleviates. She has tried oral medications and past injections with some benefit.         Review of systems negative unless otherwise specified in HPI    Past Medical History:   Diagnosis Date    Basal cell carcinoma (BCC) in situ of skin     on back    GERD (gastroesophageal reflux disease)     Hyperlipidemia     Hypertension     Incisional hernia     Uterine polyp        Past Surgical History:   Procedure Laterality Date    COLOSTOMY  2011    partial, end, distal segment closure    ENTEROSTOMY CLOSURE      with anastomosis    EXAMINATION UNDER ANESTHESIA N/A 1/13/2022    Procedure: EXAM UNDER ANESTHESIA (EUA) W/ D&C HYSTEROSCOPY SYMPHION;  Surgeon: Nadine Opitz, DO;  [facility];  Service: Gynecology    HERNIA REPAIR  2012/2013/2014    HERNIA REPAIR      Pt states with a strangled hernia    HIP SURGERY Right 2010    HIP SURGERY Left 2011    INCISIONAL HERNIA REPAIR      Incarcerated    [place] DILATION VAGINA W/ANESTHESIA OTHER THAN LOCAL N/A 12/14/2020    Procedure: EXAM UNDER ANESTHESIA (EUA);   Surgeon: Nadine Opitz, DO;  [facility];  Service: Gynecology    [place] HYSTEROSCOPY BX ENDOMETRIUM&/POLYPC W/WO D&C N/A 12/14/2020    Procedure: DILATATION AND CURETTAGE (D&C) WITH HYSTEROSCOPY;  Surgeon: Nadine Opitz, DO;  [facility];  Service: Gynecology    [place] HYSTEROSCOPY REMOVAL LEIOMYOMATA N/A 12/14/2020    Procedure: endometrial polypectomy;  Surgeon: Nadine Opitz, DO;  [facility];  Service: Gynecology    TOTAL HIP ARTHROPLASTY Bilateral     2011, 2012       Family History   Problem Relation Age of Onset    Heart disease Mother     Coronary artery disease Mother     Stroke Mother     Hypertension Mother     Heart failure Father     Coronary artery disease Father     Colon cancer Maternal Uncle 60    No Known Problems Maternal Grandmother     No Known Problems Maternal Grandfather     No Known Problems Paternal Grandmother     No Known Problems Paternal Grandfather     No Known Problems Son     No Known Problems Son        Social History     Occupational History    Not on file   Tobacco Use    Smoking status: Former    Smokeless tobacco: Never    Tobacco comments:     Pt states with hx 40 years ago   Vaping Use    Vaping Use: Never used   Substance and Sexual Activity    Alcohol use: Yes     Comment: weekly, occasional use    Drug use: Never    Sexual activity: Not Currently         Current Outpatient Medications:     ALPRAZolam (XANAX) 0.25 mg tablet, TAKE 1 TO 2 TABLETS BY MOUTH 30 MINUTES PRIOR TO PROCEDURE (Patient not taking: Reported on 6/5/2023), Disp: , Rfl:     amLODIPine (NORVASC) 5 mg tablet, Take 5 mg by mouth daily, Disp: , Rfl: 3    Ascorbic Acid (VITAMIN C PO), Take 1 tablet by mouth daily, Disp: , Rfl:     aspirin 81 MG tablet, Take 81 mg by mouth if needed (Patient not taking: Reported on 6/5/2023), Disp: , Rfl:     B Complex Vitamins (VITAMIN B COMPLEX) TABS, Take 1 tablet by mouth daily, Disp: , Rfl:     Cholecalciferol 50 MCG (2000 UT) CAPS, Take by mouth daily, Disp: , Rfl:     Coenzyme Q10 (CO Q 10 PO), Take 1 tablet by mouth daily , Disp: , Rfl:     escitalopram (LEXAPRO) 5 mg tablet, Take 5 mg by mouth daily, Disp: , Rfl:     Probiotic Product (ALIGN PO), Take 1 tablet by mouth daily , Disp: , Rfl:     rosuvastatin (CRESTOR) 5 mg tablet, Take 5 mg by mouth every other day, Disp: , Rfl:     tobramycin-dexamethasone (TOBRADEX) ophthalmic suspension, ONE DROP BOTH EYES 4 TIMES A DAY X 1 WEEK, Disp: , Rfl:     No Known Allergies         Vitals:    10/24/23 0750   BP: 130/84   Pulse: 73       Objective:  Physical exam  General: Awake, Alert, Oriented  Eyes: Pupils equal, round and reactive to light  Heart: regular rate and rhythm  Lungs: No audible wheezing  Abdomen: soft                    Ortho Exam  Bilateral knees:  No erythema or ecchymosis  No effusion or swelling  Normal strength  Good ROM with crepitus   Calf compartments soft and supple  Sensation intact  Toes are warm sensate and mobile    Left shoulder:  Mild restriction with FF and abduction  Positive empty can test       Diagnostics, reviewed and taken today if performed as documented:    None performed     Procedures, if performed today:    Large joint arthrocentesis: R knee  Universal Protocol:  Consent: Verbal consent obtained. Risks and benefits: risks, benefits and alternatives were discussed  Consent given by: patient  Time out: Immediately prior to procedure a "time out" was called to verify the correct patient, procedure, equipment, support staff and site/side marked as required.   Timeout called at: 10/24/2023 8:15 AM.  Patient understanding: patient states understanding of the procedure being performed  Patient identity confirmed: verbally with patient  Supporting Documentation  Indications: pain   Procedure Details  Location: knee - R knee  Preparation: Patient was prepped and draped in the usual sterile fashion  Needle size: 22 G  Ultrasound guidance: no  Approach: anterolateral  Medications administered: 20 mg Sodium Hyaluronate (Viscosup) 20 MG/2ML  Specialty Pharmacy Supplied: received medications from pharmacy  Patient tolerance: patient tolerated the procedure well with no immediate complications  Dressing:  Sterile dressing applied      Large joint arthrocentesis: L knee  Universal Protocol:  Consent: Verbal consent obtained. Risks and benefits: risks, benefits and alternatives were discussed  Consent given by: patient  Timeout called at: 10/24/2023 8:16 AM.  Patient understanding: patient states understanding of the procedure being performed  Patient identity confirmed: verbally with patient  Supporting Documentation  Indications: pain   Procedure Details  Location: knee - L knee  Preparation: Patient was prepped and draped in the usual sterile fashion  Needle size: 22 G  Ultrasound guidance: no  Approach: anterolateral  Medications administered: 20 mg Sodium Hyaluronate (Viscosup) 20 MG/2ML  Specialty Pharmacy Supplied: received medications from pharmacy  Patient tolerance: patient tolerated the procedure well with no immediate complications  Dressing:  Sterile dressing applied      Large joint arthrocentesis: L subacromial bursa  Universal Protocol:  Consent: Verbal consent obtained. Risks and benefits: risks, benefits and alternatives were discussed  Consent given by: patient  Time out: Immediately prior to procedure a "time out" was called to verify the correct patient, procedure, equipment, support staff and site/side marked as required.   Timeout called at: 10/24/2023 8:17 AM.  Patient understanding: patient states understanding of the procedure being performed  Site marked: the operative site was marked  Patient identity confirmed: verbally with patient  Supporting Documentation  Indications: pain   Procedure Details  Location: shoulder - L subacromial bursa  Needle size: 25 G  Ultrasound guidance: no  Approach: posterolateral  Medications administered: 2 mL bupivacaine 0.25 %; 12 mg betamethasone acetate-betamethasone sodium phosphate 6 (3-3) mg/mL; 2 mL lidocaine 1 %    Patient tolerance: patient tolerated the procedure well with no immediate complications  Dressing:  Sterile dressing applied            Portions of the record may have been created with voice recognition software. Occasional wrong word or "sound a like" substitutions may have occurred due to the inherent limitations of voice recognition software. Read the chart carefully and recognize, using context, where substitutions have occurred.

## 2023-10-31 ENCOUNTER — PROCEDURE VISIT (OUTPATIENT)
Dept: OBGYN CLINIC | Facility: HOSPITAL | Age: 78
End: 2023-10-31
Payer: COMMERCIAL

## 2023-10-31 VITALS
SYSTOLIC BLOOD PRESSURE: 143 MMHG | BODY MASS INDEX: 30.85 KG/M2 | HEIGHT: 67 IN | DIASTOLIC BLOOD PRESSURE: 81 MMHG | HEART RATE: 63 BPM

## 2023-10-31 DIAGNOSIS — M17.0 PRIMARY OSTEOARTHRITIS OF BOTH KNEES: Primary | ICD-10-CM

## 2023-10-31 PROCEDURE — 20610 DRAIN/INJ JOINT/BURSA W/O US: CPT | Performed by: ORTHOPAEDIC SURGERY

## 2023-10-31 RX ORDER — HYALURONATE SODIUM 10 MG/ML
20 SYRINGE (ML) INTRAARTICULAR
Status: COMPLETED | OUTPATIENT
Start: 2023-10-31 | End: 2023-10-31

## 2023-10-31 RX ADMIN — Medication 20 MG: at 07:45

## 2023-10-31 NOTE — PROGRESS NOTES
Assessment/Plan:     Ms. Costa Santillan presents today for repeat evaluation of bilateral knee pain in the setting of known osteoarthritis. Bilateral viscosupplementation injections provided in clinic today. Postinjection instructions provided. Rest, ice, compression, elevation as needed for symptom control  NSAIDs as needed for symptom exacerbations. Return to care in 6 months for earlier if needed      Subjective:      Rob Ross is a 68 y.o. female who presents today for repeat evaluation of bilateral knee pain in the setting of known bilateral knee osteoarthritis. She has been receiving Euflexxa injections for approximately 4 years, reports that they provide her significant symptomatic relief and last approximately 5 months in duration. She presents today for repeat bilateral viscosupplementation injections. States that her pain is achy in character, worse with weightbearing, made better with rest, 7 out of 10 in intensity. Objective:         General :   alert and oriented, in no acute distress   Gait: Normal. The patient can bear weight on the injured extremity. Bilateral Lower Extremity  Knee Effusion:  None. Ecchymosis:  none   Knee ROM:  0 to 110 degrees with subpatellar   crepitance. Patella:  Patella does track normally. Tenderness: medial joint line and lateral joint line   Stability:  Medial collateral ligament: negative  Lateral collateral ligament: negative     Sensation:   intact to light touch   Pulses: normal DP and PT pulses     Imaging  No new imaging obtained today    Large joint arthrocentesis: bilateral knee  Universal Protocol:  Consent: Verbal consent obtained. Risks and benefits: risks, benefits and alternatives were discussed  Consent given by: patient  Time out: Immediately prior to procedure a "time out" was called to verify the correct patient, procedure, equipment, support staff and site/side marked as required.   Patient understanding: patient states understanding of the procedure being performed  Site marked: the operative site was marked  Patient identity confirmed: verbally with patient  Supporting Documentation  Indications: pain   Procedure Details  Location: knee - bilateral knee  Needle size: 22 G  Ultrasound guidance: no  Approach: anterolateral    Medications (Right): 20 mg Sodium Hyaluronate (Viscosup) 20 MG/2MLMedications (Left): 20 mg Sodium Hyaluronate (Viscosup) 20 MG/2ML   Patient tolerance: patient tolerated the procedure well with no immediate complications  Dressing:  Sterile dressing applied

## 2023-11-07 ENCOUNTER — PROCEDURE VISIT (OUTPATIENT)
Dept: OBGYN CLINIC | Facility: HOSPITAL | Age: 78
End: 2023-11-07
Payer: COMMERCIAL

## 2023-11-07 VITALS
SYSTOLIC BLOOD PRESSURE: 138 MMHG | HEIGHT: 67 IN | DIASTOLIC BLOOD PRESSURE: 81 MMHG | HEART RATE: 80 BPM | BODY MASS INDEX: 30.85 KG/M2

## 2023-11-07 DIAGNOSIS — G89.29 CHRONIC PAIN OF BOTH KNEES: ICD-10-CM

## 2023-11-07 DIAGNOSIS — M17.0 PRIMARY OSTEOARTHRITIS OF BOTH KNEES: Primary | ICD-10-CM

## 2023-11-07 DIAGNOSIS — M25.562 CHRONIC PAIN OF BOTH KNEES: ICD-10-CM

## 2023-11-07 DIAGNOSIS — M70.62 TROCHANTERIC BURSITIS OF LEFT HIP: ICD-10-CM

## 2023-11-07 DIAGNOSIS — M25.561 CHRONIC PAIN OF BOTH KNEES: ICD-10-CM

## 2023-11-07 PROCEDURE — 20610 DRAIN/INJ JOINT/BURSA W/O US: CPT | Performed by: ORTHOPAEDIC SURGERY

## 2023-11-07 RX ORDER — HYALURONATE SODIUM 10 MG/ML
20 SYRINGE (ML) INTRAARTICULAR
Status: COMPLETED | OUTPATIENT
Start: 2023-11-07 | End: 2023-11-07

## 2023-11-07 RX ADMIN — Medication 20 MG: at 07:45

## 2023-11-07 NOTE — PROGRESS NOTES
Assessment:  1. Primary osteoarthritis of both knees  Large joint arthrocentesis: bilateral knee      2. Chronic pain of both knees  Large joint arthrocentesis: bilateral knee      3. Trochanteric bursitis of left hip  Ambulatory Referral to Physical Therapy          Plan:  Patient provided with 3 of 3 Euflexxa injections today. Tolerated well  Ice today  Activities as tolerated  Left hip pain is consistent with trochanteric bursitis rather than an issue with her prothesis. Patient declined CS injection today, patient may use Voltaren Gel OTC. Recommend a course of PT. To do next visit:  Return in about 3 months (around 2/7/2024). The above stated was discussed in layman's terms and the patient expressed understanding. All questions were answered to the patient's satisfaction. Subjective:   Dimitris Raza is a 68 y.o. female who presents today for 3 of 3 bilateral Euflexxa injections today. Patient rates her pain 7/10 today. Patient reports increased pain with WB activities, improved with rest.   Patient also reports left lateral hip pain. Patient has a hx of bilateral REGGIE by Dr. Regina Robert.        Past Medical History:   Diagnosis Date    Basal cell carcinoma (BCC) in situ of skin     on back    GERD (gastroesophageal reflux disease)     Hyperlipidemia     Hypertension     Incisional hernia     Uterine polyp        Past Surgical History:   Procedure Laterality Date    COLOSTOMY  2011    partial, end, distal segment closure    ENTEROSTOMY CLOSURE      with anastomosis    EXAMINATION UNDER ANESTHESIA N/A 1/13/2022    Procedure: EXAM UNDER ANESTHESIA (EUA) W/ D&C HYSTEROSCOPY SYMPHION;  Surgeon: Chai Naidu DO;  [facility];  Service: Gynecology    HERNIA REPAIR  2012/2013/2014    HERNIA REPAIR      Pt states with a strangled hernia    HIP SURGERY Right 2010    HIP SURGERY Left 2011    INCISIONAL HERNIA REPAIR      Incarcerated    [place] DILATION VAGINA W/ANESTHESIA OTHER THAN LOCAL N/A 12/14/2020    Procedure: EXAM UNDER ANESTHESIA (EUA);   Surgeon: Theresa Ardon DO;  [facility];  Service: Gynecology    [place] HYSTEROSCOPY BX ENDOMETRIUM&/POLYPC W/WO D&C N/A 12/14/2020    Procedure: DILATATION AND CURETTAGE (D&C) WITH HYSTEROSCOPY;  Surgeon: Theresa Ardon DO;  [facility];  Service: Gynecology    PA HYSTEROSCOPY REMOVAL LEIOMYOMATA N/A 12/14/2020    Procedure: endometrial polypectomy;  Surgeon: Theresa Ardon DO;  [facility];  Service: Gynecology    TOTAL HIP ARTHROPLASTY Bilateral     2011, 2012       Family History   Problem Relation Age of Onset    Heart disease Mother     Coronary artery disease Mother     Stroke Mother     Hypertension Mother     Heart failure Father     Coronary artery disease Father     Colon cancer Maternal Uncle 60    No Known Problems Maternal Grandmother     No Known Problems Maternal Grandfather     No Known Problems Paternal Grandmother     No Known Problems Paternal Grandfather     No Known Problems Son     No Known Problems Son        Social History     Occupational History    Not on file   Tobacco Use    Smoking status: Former    Smokeless tobacco: Never    Tobacco comments:     Pt states with hx 40 years ago   Vaping Use    Vaping Use: Never used   Substance and Sexual Activity    Alcohol use: Yes     Comment: weekly, occasional use    Drug use: Never    Sexual activity: Not Currently         Current Outpatient Medications:     ALPRAZolam (XANAX) 0.25 mg tablet, TAKE 1 TO 2 TABLETS BY MOUTH 30 MINUTES PRIOR TO PROCEDURE (Patient not taking: Reported on 6/5/2023), Disp: , Rfl:     amLODIPine (NORVASC) 5 mg tablet, Take 5 mg by mouth daily, Disp: , Rfl: 3    Ascorbic Acid (VITAMIN C PO), Take 1 tablet by mouth daily, Disp: , Rfl:     aspirin 81 MG tablet, Take 81 mg by mouth if needed (Patient not taking: Reported on 6/5/2023), Disp: , Rfl:     B Complex Vitamins (VITAMIN B COMPLEX) TABS, Take 1 tablet by mouth daily, Disp: , Rfl:     Cholecalciferol 50 MCG (2000 UT) CAPS, Take by mouth daily, Disp: , Rfl:     Coenzyme Q10 (CO Q 10 PO), Take 1 tablet by mouth daily , Disp: , Rfl:     escitalopram (LEXAPRO) 5 mg tablet, Take 5 mg by mouth daily, Disp: , Rfl:     Probiotic Product (ALIGN PO), Take 1 tablet by mouth daily , Disp: , Rfl:     rosuvastatin (CRESTOR) 5 mg tablet, Take 5 mg by mouth every other day, Disp: , Rfl:     tobramycin-dexamethasone (TOBRADEX) ophthalmic suspension, ONE DROP BOTH EYES 4 TIMES A DAY X 1 WEEK, Disp: , Rfl:     No Known Allergies      Objective:  Vitals:    11/07/23 0751   BP: 138/81   Pulse: 80       Review of Systems   Constitutional:  Negative for chills and fever. HENT:  Negative for drooling and sneezing. Eyes:  Negative for redness. Respiratory:  Negative for cough and wheezing. Gastrointestinal:  Negative for nausea and vomiting. Musculoskeletal:         Please see ortho exam   Psychiatric/Behavioral:  Negative for behavioral problems. The patient is not nervous/anxious. Left Hip Exam     Tenderness   The patient is experiencing tenderness in the greater trochanter. Range of Motion   The patient has normal left hip ROM. Other   Erythema: absent  Sensation: normal  Pulse: present            Physical Exam  Vitals reviewed. Constitutional:       Appearance: She is well-developed. Eyes:      Pupils: Pupils are equal, round, and reactive to light. Pulmonary:      Effort: Pulmonary effort is normal.      Breath sounds: Normal breath sounds. Abdominal:      General: Abdomen is flat. There is no distension. Skin:     General: Skin is warm and dry. Neurological:      Mental Status: She is alert and oriented to person, place, and time. Psychiatric:         Behavior: Behavior normal.         Thought Content:  Thought content normal.         Judgment: Judgment normal.           Diagnostics, reviewed and taken today if performed as documented:    None performed Procedures, if performed today:    Large joint arthrocentesis: bilateral knee  Universal Protocol:  Consent given by: patient  Patient understanding: patient states understanding of the procedure being performed  Site marked: the operative site was marked  Supporting Documentation  Indications: pain   Procedure Details  Location: knee - bilateral knee  Needle size: 22 G  Ultrasound guidance: no  Approach: lateral    Medications (Right): 20 mg Sodium Hyaluronate (Viscosup) 20 MG/2MLMedications (Left): 20 mg Sodium Hyaluronate (Viscosup) 20 MG/2ML   Patient tolerance: patient tolerated the procedure well with no immediate complications  Dressing:  Sterile dressing applied                Scribe Attestation      I,:  Cyrus Sexton am acting as a scribe while in the presence of the attending physician.:       I,:  Annetta Diaz MD personally performed the services described in this documentation    as scribed in my presence.:                 Portions of the record may have been created with voice recognition software. Occasional wrong word or "sound a like" substitutions may have occurred due to the inherent limitations of voice recognition software. Read the chart carefully and recognize, using context, where substitutions have occurred.

## 2023-11-28 ENCOUNTER — OFFICE VISIT (OUTPATIENT)
Dept: SLEEP CENTER | Facility: CLINIC | Age: 78
End: 2023-11-28
Payer: COMMERCIAL

## 2023-11-28 VITALS
HEART RATE: 86 BPM | HEIGHT: 67 IN | OXYGEN SATURATION: 95 % | SYSTOLIC BLOOD PRESSURE: 124 MMHG | BODY MASS INDEX: 31.08 KG/M2 | WEIGHT: 198 LBS | DIASTOLIC BLOOD PRESSURE: 60 MMHG

## 2023-11-28 DIAGNOSIS — G47.33 OBSTRUCTIVE SLEEP APNEA (ADULT) (PEDIATRIC): Primary | ICD-10-CM

## 2023-11-28 DIAGNOSIS — I10 PRIMARY HYPERTENSION: ICD-10-CM

## 2023-11-28 PROCEDURE — 99205 OFFICE O/P NEW HI 60 MIN: CPT | Performed by: NURSE PRACTITIONER

## 2023-11-28 NOTE — PATIENT INSTRUCTIONS
1. Schedule an appointment for set up of PAP equipment  2. Begin using your equipment every night  3. Begin cleaning your equipment, as recommended   4. Contact your medical equipment distributor regarding any questions concerning your PAP equipment  5. Contact the [facility] with any other questions, prior to next visit, if needed  6. Schedule compliance follow-up visit in 31-91 days, as required by insurance       Nursing Support:  When: Monday through Friday 7A-5PM except holidays  Where: Our direct line is [phone]. If you are having a true emergency please call 911. In the event that the line is busy or it is after hours please leave a voice message and we will return your call. Please speak clearly, leaving your full name, birth date, best number to reach you and the reason for your call. Medication refills: We will need the name of the medication, the dosage, the ordering provider, whether you get a 30 or 90 day refill, and the pharmacy name and address. Medications will be ordered by the provider only. Nurses cannot call in prescriptions. Please allow 7 days for medication refills. Physician requested updates: If your provider requested that you call with an update after starting medication, please be ready to provide us the medication and dosage, what time you take your medication, the time you attempt to fall asleep, time you fall asleep, when you wake up, and what time you get out of bed. Sleep Study Results: We will contact you with sleep study results and/or next steps after the physician has reviewed your testing.

## 2023-11-28 NOTE — PROGRESS NOTES
Consultation - [facility], 1945, MRN: [id number]    11/28/2023        Reason for Consult / Principal Problem:  Mild Obstructive Sleep Apnea  Excessive daytime sleepiness  Sleep initiation and sleep maintenance insomnia       Thank you for the opportunity of participating in the evaluation and care of this patient in the Sleep Clinic at [address] [place] [address]. Subjective:     HPI: Sharif Cross is a 68y.o. year old female. She presents for a consultation regarding mild obstructive sleep apnea. She discussed symptoms of loud snoring, excessive daytime sleepiness with her PCP and a diagnostic sleep study was ordered and completed. She was referred to Sleep Medicine for a consultation and today was the first available appointment. She reports that she routinely falls asleep for a few hours in front of her television. By the time she goes to bed, she feels awake and has difficulty falling asleep. She lies in bed for up to 2 hours before falling asleep. She wakes up around 3:00am for urination or unknown causes and has difficulty returning to sleep. She also wakes up with migraine auras upon awakening that last for 20 minutes. She also has them spontaneously at times during the day. Comorbid conditions include:  Obesity  HTN  Insomnia  Excessive daytime sleepiness        Sleep Study Results:  She states that she had a sleep study done 20 years ago, but doesn't recall the results. No follow up was done after the study. Results of the diagnostic study, completed on 2/21/23, are as follows:  IMPRESSION:   Mild obstructive sleep apnea. Respiratory events were worse in REM sleep. Normal baseline oxygen saturation. Mild desaturations were present with respiratory events. Sleep efficiency was low normal. Decreased Stage N1 sleep (light sleep) was present. . Decreased Stage N3 sleep (deep sleep) was noted but the overall percentage was normal. REM sleep percentage was decreased on this test.   Increased periodic limb movements during sleep. These did not contribute to significant sleep disruption and cortical arousals. EKG showed normal sinus rhythm. 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 She works between the hours of 10:00-2:00pm 3 days per week and also works 5:00-9:00pm on Wednesday evenings and Saturdays 9:30-1:00pm    Sleep Schedule:       Bedtime:  11:30pm-midnight, often sleeps in her living room from 7:30-11:00pm      Latency:  up to 2 hours      Wakeup time:  between 7:00-8:00am    Awakenings:       Frequency:  3 times per night      Causes:  noises, unknown causes, some nights for urination      Duration:  she may return without difficulty but often struggles to return to sleep    Daytime Sleepiness / Inappropriate Sleep:       Most severe:  after eating breakfast, between 3:00-4:00pm       Naps :  she does not take planned naps      Inappropriate drowsiness / sleep:  she dozes while watching TV, sometimes in the am on days off or in the evenings    Snoring:  she wakes herself from snoring    Apnea:  no witnessed apnea    Change in Weight:  weight fluctuates by 5 lbs. Restless Leg Syndrome:  no clinical symptoms consistent with this diagnosis that she has awareness of. PLMs were noted during the sleep study, but did not cause arousals. Other Complaints:  She walked in her sleep as a child. No current evidence of sleep walking. Currently sleeps alone. She had one episode of sleep paralysis as a child with no further episodes. Over the past year or so, she began having hypnopompic hallucinations that appear as brief flashes of people standing in front of her. Episodes are brief flashes and she does not feel that she had been having dreams of these people prior to the hallucination. She frequently recalls dreams she has during the night.     At times, she also wakes up with a migraine aura of neon triangles that slowly dissipate over 20 minutes. She does not wake up with headaches. No reports of bruxism. Social History:      Caffeine:  8 ounces of coffee in the afternoon, one cup of tea in the afternoon     Tobacco:   reports that she has quit smoking. She has never used smokeless tobacco.     E-cig/Vaping:    E-Cigarette/Vaping    E-Cigarette Use Never User       E-Cigarette/Vaping Substances    Nicotine No     THC No     CBD No     Flavoring No     Other No          Alcohol:   reports current alcohol use. 2 times per week      Drugs:   reports no history of drug use. The review of systems and following portions of the patient's history were reviewed and updated as appropriate: allergies, current medications, past family history, past medical history, past social history, past surgical history and problem list.        Objective:       Vitals:    11/28/23 1300   BP: 124/60   Pulse: 86   SpO2: 95%   Weight: 89.8 kg (198 lb)   Height: 5' 7" (1.702 m)     Body mass index is 31.01 kg/m². Neck Circumference: 15.5  [place] Sleepiness Scale:  Total score: 12      Current Outpatient Medications:     amLODIPine (NORVASC) 5 mg tablet, Take 5 mg by mouth daily, Disp: , Rfl: 3    Ascorbic Acid (VITAMIN C PO), Take 1 tablet by mouth daily, Disp: , Rfl:     B Complex Vitamins (VITAMIN B COMPLEX) TABS, Take 1 tablet by mouth daily, Disp: , Rfl:     Cholecalciferol 50 MCG (2000 UT) CAPS, Take by mouth daily, Disp: , Rfl:     Coenzyme Q10 (CO Q 10 PO), Take 1 tablet by mouth daily , Disp: , Rfl:     escitalopram (LEXAPRO) 5 mg tablet, Take 5 mg by mouth daily, Disp: , Rfl:     Probiotic Product (ALIGN PO), Take 1 tablet by mouth daily , Disp: , Rfl:     rosuvastatin (CRESTOR) 5 mg tablet, Take 5 mg by mouth every other day, Disp: , Rfl:     ALPRAZolam (XANAX) 0.25 mg tablet, TAKE 1 TO 2 TABLETS BY MOUTH 30 MINUTES PRIOR TO PROCEDURE (Patient not taking: Reported on 6/5/2023), Disp: , Rfl:     aspirin 81 MG tablet, Take 81 mg by mouth if needed (Patient not taking: Reported on 6/5/2023), Disp: , Rfl:     tobramycin-dexamethasone (TOBRADEX) ophthalmic suspension, ONE DROP BOTH EYES 4 TIMES A DAY X 1 WEEK (Patient not taking: Reported on 11/28/2023), Disp: , Rfl:     Physical Exam  General Appearance:   Alert, cooperative, no distress, appears stated age, obese     Head:   Normocephalic, without obvious abnormality, atraumatic     Eyes:   Conjunctiva/corneas clear          Nose:  Nares normal, septum midline, mucosa normal, no drainage or sinus tenderness           Throat:  Lips, teeth and gums normal; tongue normal in size and midline in position; mucosa moist with short A/P diameter and small oropharyngeal opening, uvula normal, tonsils not visualized, Mallampati class 3-4       Neck:  Supple, short, symmetrical, trachea midline, no adenopathy; no thyromegaly noted, no carotid bruit or JVD     Lungs:      Clear to auscultation bilaterally, respirations unlabored     Heart:   Regular rate and rhythm, S1 and S2 normal, no murmur, rub or gallop       Extremities:  Extremities normal, atraumatic, no cyanosis or edema       Skin:  Skin color, texture, turgor normal, no rashes or lesions       Neurologic:  No focal deficits noted. ASSESSMENT / PLAN     1. Obstructive sleep apnea (adult) (pediatric)  Cpap DME      2. Primary hypertension              Counseling / Coordination of Care  I have spent a total time of 60 minutes on 11/28/23 in caring for this patient including Risks and benefits of tx options, Instructions for management, Patient and family education, Importance of tx compliance, Risk factor reductions, Impressions, Counseling / Coordination of care, Documenting in the medical record, Reviewing / ordering tests, medicine, procedures  , and Obtaining or reviewing history  . I reviewed the recent test results with the patient.   We talked about the abnormal findings, including those consistent with Obstructive Sleep Apnea. We then discussed how the these are categorized as mild, moderate or severe. We also covered the medical comorbidities associated with untreated Obstructive Sleep Apnea including, hypertension, cardiac arrythmia, myocardial infarction and stroke. I explained how the risk of these conditions increases with the severity of the test results. I also spoke in some detail about the most common treatment options including weight loss, nasal PAP, mandibular advancement devices and uvulopalatopharyngoplasty (UPPP). I answered all questions posed to me and gave my opinion regarding the best options for treatment based on the patient and their level of disease, as well as symptoms and HTN. In this case she chose to begin treatment using APAP. She will be scheduled for set up of equipment. She does not feel she could tolerate a full face mask. We discussed starting with a nasal cradle with a chin strap. The patient will return 31-91 days after beginning use of PAP therapy for a review of compliance data collected after beginning treatment. We will discuss this data and talk about any problems that may prevent successful treatment of Obstructive Sleep Apnea. Changes will be made in treatment parameters or interface devices in order to improve her ability to continue using PAP to maintain upper airway patency during sleep. The following instructions have been given to the patient today:    Patient Instructions   1. Schedule an appointment for set up of PAP equipment  2. Begin using your equipment every night  3. Begin cleaning your equipment, as recommended   4. Contact your medical equipment distributor regarding any questions concerning your PAP equipment  5. Contact the Sleep [address] [place] [address] with any other questions, prior to next visit, if needed  6.   Schedule compliance follow-up visit in 31-91 days, as required by insurance Nursing Support:  When: Monday through Friday 7A-5PM except holidays  Where: Our direct line is [phone]. If you are having a true emergency please call 911. In the event that the line is busy or it is after hours please leave a voice message and we will return your call. Please speak clearly, leaving your full name, birth date, best number to reach you and the reason for your call. Medication refills: We will need the name of the medication, the dosage, the ordering provider, whether you get a 30 or 90 day refill, and the pharmacy name and address. Medications will be ordered by the provider only. Nurses cannot call in prescriptions. Please allow 7 days for medication refills. Physician requested updates: If your provider requested that you call with an update after starting medication, please be ready to provide us the medication and dosage, what time you take your medication, the time you attempt to fall asleep, time you fall asleep, when you wake up, and what time you get out of bed. Sleep Study Results: We will contact you with sleep study results and/or next steps after the physician has reviewed your testing. [facility], [address]      Portions of the record may have been created with voice recognition software. Occasional wrong word or "sound a like" substitutions may have occurred due to the inherent limitations of voice recognition software. Read the chart carefully and recognize, using context, where substitutions have occurred.

## 2023-11-29 ENCOUNTER — HOSPITAL ENCOUNTER (OUTPATIENT)
Dept: RADIOLOGY | Age: 78
Discharge: HOME | End: 2023-11-29
Payer: COMMERCIAL

## 2023-11-29 DIAGNOSIS — M85.80 OSTEOPENIA, UNSPECIFIED LOCATION: ICD-10-CM

## 2023-11-29 PROCEDURE — 77080 DXA BONE DENSITY AXIAL: CPT

## 2023-11-30 ENCOUNTER — TELEPHONE (OUTPATIENT)
Dept: SLEEP CENTER | Facility: CLINIC | Age: 78
End: 2023-11-30

## 2023-11-30 LAB

## 2023-12-04 LAB

## 2023-12-12 ENCOUNTER — TELEPHONE (OUTPATIENT)
Dept: SLEEP CENTER | Facility: CLINIC | Age: 78
End: 2023-12-12

## 2023-12-12 NOTE — TELEPHONE ENCOUNTER
Pt. was set up today in [facility] (Sleep) on A Resmed S11 set at 4-15cm and given a N30i (S) nasal mask.

## 2023-12-13 LAB

## 2023-12-28 ENCOUNTER — TELEPHONE (OUTPATIENT)
Dept: OBGYN CLINIC | Facility: CLINIC | Age: 78
End: 2023-12-28

## 2024-01-01 NOTE — TELEPHONE ENCOUNTER
Per Dr Lilli Alston sleep study shows mild MAX  Patient needs to be scheduled for consult       Left a call back message
-Bad smell from umbilical cord. Reddish color of skin around cord or drainage from the cord.

## 2024-02-06 ENCOUNTER — OFFICE VISIT (OUTPATIENT)
Dept: OBGYN CLINIC | Facility: HOSPITAL | Age: 79
End: 2024-02-06
Payer: COMMERCIAL

## 2024-02-06 VITALS
DIASTOLIC BLOOD PRESSURE: 80 MMHG | HEIGHT: 67 IN | WEIGHT: 195 LBS | HEART RATE: 76 BPM | BODY MASS INDEX: 30.61 KG/M2 | SYSTOLIC BLOOD PRESSURE: 143 MMHG

## 2024-02-06 DIAGNOSIS — M25.561 CHRONIC PAIN OF BOTH KNEES: ICD-10-CM

## 2024-02-06 DIAGNOSIS — M25.562 CHRONIC PAIN OF BOTH KNEES: ICD-10-CM

## 2024-02-06 DIAGNOSIS — M17.0 PRIMARY OSTEOARTHRITIS OF BOTH KNEES: Primary | ICD-10-CM

## 2024-02-06 DIAGNOSIS — G89.29 CHRONIC PAIN OF BOTH KNEES: ICD-10-CM

## 2024-02-06 PROCEDURE — 99213 OFFICE O/P EST LOW 20 MIN: CPT | Performed by: ORTHOPAEDIC SURGERY

## 2024-02-06 NOTE — PROGRESS NOTES
Assessment:   Diagnosis ICD-10-CM Associated Orders   1. Primary osteoarthritis of both knees  M17.0       2. Chronic pain of both knees  M25.561     M25.562     G89.29           Plan:  78 year old female presents for re-evaluation of bilateral knees   - Doing well, denied need for steroid injection   - Advised to continue HEP  - Prior auth started for visco injections for bilateral knees, will call when injections arrive     Diagnostics reviewed and physical exam performed.  Diagnosis, treatment options and associated risks were discussed with the patient including no treatment, nonsurgical treatment and potential for surgical intervention.  The patient was given the opportunity to ask questions regarding each.        To do next visit:  Follow up in 3 months for bilateral knee gel injections     The above stated was discussed in layman's terms and the patient expressed understanding.  All questions were answered to the patient's satisfaction.         Subjective:   Yumi Gaspar is a 78 y.o. female who presents for 3 month follow up of bilateral knees.  Patient last seen in November, 2023 and received 3 shot series of Euflexxa to bilateral knees.  She admits to relief of pain after completion of visco supplementation injections.  She also admits to completing physical therapy which has helped her knees and right hip pain. She admits to pain in both knees with stairs and low seating.    Pain score 7/10.         Review of systems negative unless otherwise specified in HPI    Past Medical History:   Diagnosis Date    Basal cell carcinoma (BCC) in situ of skin     on back    GERD (gastroesophageal reflux disease)     Hyperlipidemia     Hypertension     Incisional hernia     Uterine polyp        Past Surgical History:   Procedure Laterality Date    COLOSTOMY  2011    partial, end, distal segment closure    ENTEROSTOMY CLOSURE      with anastomosis    EXAMINATION UNDER ANESTHESIA N/A 1/13/2022    Procedure: EXAM  UNDER ANESTHESIA (EUA) W/ D&C HYSTEROSCOPY SYMPHION;  Surgeon: Astrid Melton DO;  Location: [facility];  Service: Gynecology    HERNIA REPAIR  2012/2013/2014    HERNIA REPAIR      Pt states with a strangled hernia    HIP SURGERY Right 2010    HIP SURGERY Left 2011    INCISIONAL HERNIA REPAIR      Incarcerated    MI DILATION VAGINA W/ANESTHESIA OTHER THAN LOCAL N/A 12/14/2020    Procedure: EXAM UNDER ANESTHESIA (EUA);  Surgeon: Astrid Melton DO;  Location: [facility];  Service: Gynecology    MI HYSTEROSCOPY BX ENDOMETRIUM&/POLYPC W/WO D&C N/A 12/14/2020    Procedure: DILATATION AND CURETTAGE (D&C) WITH HYSTEROSCOPY;  Surgeon: Astrid Melton DO;  Location: [facility];  Service: Gynecology    MI HYSTEROSCOPY REMOVAL LEIOMYOMATA N/A 12/14/2020    Procedure: endometrial polypectomy;  Surgeon: Astrid Melton DO;  Location: [facility];  Service: Gynecology    TOTAL HIP ARTHROPLASTY Bilateral     2011, 2012       Family History   Problem Relation Age of Onset    Heart disease Mother     Coronary artery disease Mother     Stroke Mother     Hypertension Mother     Heart failure Father     Coronary artery disease Father     Colon cancer Maternal Uncle 60    No Known Problems Maternal Grandmother     No Known Problems Maternal Grandfather     No Known Problems Paternal Grandmother     No Known Problems Paternal Grandfather     No Known Problems Son     No Known Problems Son        Social History     Occupational History    Not on file   Tobacco Use    Smoking status: Former    Smokeless tobacco: Never    Tobacco comments:     Pt states with hx 40 years ago   Vaping Use    Vaping status: Never Used   Substance and Sexual Activity    Alcohol use: Yes     Comment: weekly, occasional use    Drug use: Never    Sexual activity: Not Currently         Current Outpatient Medications:     ALPRAZolam (XANAX) 0.25 mg tablet, TAKE 1 TO 2 TABLETS BY MOUTH 30 MINUTES PRIOR TO PROCEDURE (Patient not taking: Reported on 6/5/2023),  Disp: , Rfl:     amLODIPine (NORVASC) 5 mg tablet, Take 5 mg by mouth daily, Disp: , Rfl: 3    Ascorbic Acid (VITAMIN C PO), Take 1 tablet by mouth daily, Disp: , Rfl:     aspirin 81 MG tablet, Take 81 mg by mouth if needed (Patient not taking: Reported on 6/5/2023), Disp: , Rfl:     B Complex Vitamins (VITAMIN B COMPLEX) TABS, Take 1 tablet by mouth daily, Disp: , Rfl:     Cholecalciferol 50 MCG (2000 UT) CAPS, Take by mouth daily, Disp: , Rfl:     Coenzyme Q10 (CO Q 10 PO), Take 1 tablet by mouth daily , Disp: , Rfl:     escitalopram (LEXAPRO) 5 mg tablet, Take 5 mg by mouth daily, Disp: , Rfl:     Probiotic Product (ALIGN PO), Take 1 tablet by mouth daily , Disp: , Rfl:     rosuvastatin (CRESTOR) 5 mg tablet, Take 5 mg by mouth every other day, Disp: , Rfl:     tobramycin-dexamethasone (TOBRADEX) ophthalmic suspension, ONE DROP BOTH EYES 4 TIMES A DAY X 1 WEEK (Patient not taking: Reported on 11/28/2023), Disp: , Rfl:     No Known Allergies         Vitals:    02/06/24 1406   BP: 143/80   Pulse: 76       Objective:  Physical exam  General: Awake, Alert, Oriented  Eyes: Pupils equal, round and reactive to light  Heart: regular rate and rhythm  Lungs: No audible wheezing  Abdomen: soft                    Right Knee Exam     Tenderness   The patient is experiencing no tenderness.     Range of Motion   Extension:  normal   Flexion:  normal     Other   Erythema: absent  Scars: absent  Sensation: normal  Swelling: none  Effusion: no effusion present      Left Knee Exam     Tenderness   The patient is experiencing no tenderness.     Range of Motion   Extension:  normal   Flexion:  normal     Other   Erythema: absent  Scars: absent  Sensation: normal  Swelling: none  Effusion: no effusion present            Diagnostics, reviewed and taken today if performed as documented:    None performed        Procedures, if performed today:    None performed      Portions of the record may have been created with voice recognition  "software.  Occasional wrong word or \"sound a like\" substitutions may have occurred due to the inherent limitations of voice recognition software.  Read the chart carefully and recognize, using context, where substitutions have occurred.      "

## 2024-02-09 ENCOUNTER — HOSPITAL ENCOUNTER (OUTPATIENT)
Dept: RADIOLOGY | Facility: HOSPITAL | Age: 79
Discharge: HOME | End: 2024-02-09
Payer: COMMERCIAL

## 2024-02-09 DIAGNOSIS — R10.84 GENERALIZED ABDOMINAL PAIN: ICD-10-CM

## 2024-02-09 PROCEDURE — G1004 CDSM NDSC: HCPCS

## 2024-02-09 PROCEDURE — 74176 CT ABD & PELVIS W/O CONTRAST: CPT

## 2024-03-06 ENCOUNTER — HOSPITAL ENCOUNTER (OUTPATIENT)
Dept: RADIOLOGY | Facility: HOSPITAL | Age: 79
Discharge: HOME | End: 2024-03-06
Payer: COMMERCIAL

## 2024-03-06 DIAGNOSIS — R10.33 PERIUMBILICAL ABDOMINAL PAIN: ICD-10-CM

## 2024-03-06 PROCEDURE — 76700 US EXAM ABDOM COMPLETE: CPT

## 2024-03-15 ENCOUNTER — OFFICE VISIT (OUTPATIENT)
Dept: SLEEP CENTER | Facility: CLINIC | Age: 79
End: 2024-03-15
Payer: COMMERCIAL

## 2024-03-15 VITALS
SYSTOLIC BLOOD PRESSURE: 128 MMHG | HEART RATE: 69 BPM | OXYGEN SATURATION: 93 % | HEIGHT: 67 IN | DIASTOLIC BLOOD PRESSURE: 82 MMHG | BODY MASS INDEX: 30.92 KG/M2 | WEIGHT: 197 LBS

## 2024-03-15 DIAGNOSIS — G47.33 OBSTRUCTIVE SLEEP APNEA (ADULT) (PEDIATRIC): Primary | ICD-10-CM

## 2024-03-15 DIAGNOSIS — Z78.9 DIFFICULTY USING CONTINUOUS POSITIVE AIRWAY PRESSURE (CPAP) DEVICE: ICD-10-CM

## 2024-03-15 PROCEDURE — 99213 OFFICE O/P EST LOW 20 MIN: CPT | Performed by: NURSE PRACTITIONER

## 2024-03-15 PROCEDURE — G2211 COMPLEX E/M VISIT ADD ON: HCPCS | Performed by: NURSE PRACTITIONER

## 2024-03-15 NOTE — PROGRESS NOTES
Progress Note - [facility]   Yumi Gaspar 78 y.o. female   :1945, MRN: [id number]  3/15/2024          Follow Up Evaluation / Problem:     Mild Obstructive Sleep Apnea      Thank you for the opportunity of participating in the evaluation and care of this patient in the Sleep Clinic at [facility].      HPI: Yumi Gaspar is a 78 y.o. year old female.  The patient presents for follow up of mild obstructive sleep apnea, that becomes moderate in REM sleep.  She discussed symptoms of loud snoring and excessive daytime sleepiness with her PCP.  She also suffers from chronic headaches, some of which she wakes up with, resolving within 20 minutes of awakening.    She completed a diagnostic sleep study in 2023.  Testing noted mild MAX with and AHI of 5.1, worsening to 21.6 in REM sleep.  Oxygen joselin was 86%.  CPAP equipment was ordered and set up in 2024.  She returns to review compliance and effectiveness of treatment.      Current Outpatient Medications:     amLODIPine (NORVASC) 5 mg tablet, Take 5 mg by mouth daily, Disp: , Rfl: 3    Ascorbic Acid (VITAMIN C PO), Take 1 tablet by mouth daily, Disp: , Rfl:     B Complex Vitamins (VITAMIN B COMPLEX) TABS, Take 1 tablet by mouth daily, Disp: , Rfl:     Cholecalciferol 50 MCG ( UT) CAPS, Take by mouth daily, Disp: , Rfl:     Coenzyme Q10 (CO Q 10 PO), Take 1 tablet by mouth daily , Disp: , Rfl:     escitalopram (LEXAPRO) 5 mg tablet, Take 5 mg by mouth daily, Disp: , Rfl:     Probiotic Product (ALIGN PO), Take 1 tablet by mouth daily , Disp: , Rfl:     rosuvastatin (CRESTOR) 5 mg tablet, Take 5 mg by mouth every other day, Disp: , Rfl:     tobramycin-dexamethasone (TOBRADEX) ophthalmic suspension, , Disp: , Rfl:     ALPRAZolam (XANAX) 0.25 mg tablet, TAKE 1 TO 2 TABLETS BY MOUTH 30 MINUTES PRIOR TO PROCEDURE (Patient not taking: Reported on 2023), Disp: , Rfl:      "aspirin 81 MG tablet, Take 81 mg by mouth if needed (Patient not taking: Reported on 6/5/2023), Disp: , Rfl:     How likely are you to doze off or fall asleep in the following situations, in contrast to feeling just tired?  Sitting and reading: Moderate chance of dozing  Watching TV: High chance of dozing  Sitting, inactive in a public place (e.g. a theatre or a meeting): Slight chance of dozing  As a passenger in a car for an hour without a break: Slight chance of dozing  Lying down to rest in the afternoon when circumstances permit: Slight chance of dozing  Sitting and talking to someone: Would never doze  Sitting quietly after a lunch without alcohol: Would never doze  In a car, while stopped for a few minutes in traffic: Would never doze  Total score: 8              Vitals:    03/15/24 1326   BP: 128/82   BP Location: Left arm   Patient Position: Sitting   Cuff Size: Standard   Pulse: 69   SpO2: 93%   Weight: 89.4 kg (197 lb)   Height: 5' 7\" (1.702 m)       Body mass index is 30.85 kg/m².            EPWORTH SLEEPINESS SCORE  Total score: 8      Past History Since Last Sleep Center Visit:   She reports that she initially had difficulty using the equipment.  She states that she tried several nights in a row, but was not able to keep the mask on for the 4 hours required.  She then developed some URI symptoms and did not return to using the equipment until approximately one week ago.  She was able to use it for longer periods of time and felt more rested when she woke up.  She would like the opportunity to continue to use it with a different face mask.    Since she has been using it more consistently over the past week, she has noticed improvement in sleep and daytime sleepiness.  She also noticed decrease in post nasal drip she was having during the night.        The review of systems and following portions of the patient's history were reviewed and updated as appropriate: allergies, current medications, past " family history, past medical history, past social history, past surgical history, and problem list.        OBJECTIVE  Equipment set up date:  12/12/23  PAP Pressure: Nasal AutoPAP using a lower limit of 4 cm and an upper limit of 15 cm of water pressure  Type of mask used: full face  DME Provider: [facility]    Physical Exam:     General Appearance:   Alert, cooperative, no distress, appears stated age     Lungs:    Clear to auscultation bilaterally, respirations unlabored     Heart:   Regular rate and rhythm, S1 and S2 normal, no murmur, rub or gallop           ASSESSMENT / PLAN    1. Obstructive sleep apnea (adult) (pediatric)  PAP DME Resupply/Reorder      2. Difficulty using continuous positive airway pressure (CPAP) device                Counseling / Coordination of Care  I have spent a total time of 25 minutes on 03/17/24 in caring for this patient including Risks and benefits of tx options, Instructions for management, Patient and family education, Importance of tx compliance, Risk factor reductions, Impressions, Counseling / Coordination of care, Documenting in the medical record, and Reviewing / ordering tests, medicine, procedures  .      Today I reviewed the patient's compliance data.  she has been able to use the equipment 10% of all days recorded.  Average usage was 4 or more hours 3% of all days recorded.  The patient uses the equipment for an average of 3 hours and 10 minutes per night.  The estimated AHI is 1.4 abnormal breathing events per hour.  The patient feels they benefit from the use of PAP equipment and would like to continue PAP therapy.  An extension to the compliance period is medically necessary.  Recent response to treatment has been good.  A mask fitting was arranged today.  A prescription for supplies has been provided to last for the next year.    She will continue using this equipment at the settings noted above for the next 3 months.  At that timeshe will then return for a routine  "follow-up evaluation. I have asked the patient to contact the Sleep Disorders Center if she encounters any difficulties prior to that time.    The following instructions have been given to the patient today:    Patient Instructions   1.  Continue use of CPAP equipment nightly  2.  Continue to clean your equipment, as discussed  2.5.  Call if you feel the setting is too strong and we can decrease the upper limit  3.  Contact the Sleep Disorders Center with any questions or concerns prior to your next visit, as needed  4.  Schedule visit for follow-up in 3 months        Nursing Support:  When: Monday through Friday 7A-5PM except holidays  Where: Our direct line is [phone].    If you are having a true emergency please call 911.  In the event that the line is busy or it is after hours please leave a voice message and we will return your call.  Please speak clearly, leaving your full name, birth date, best number to reach you and the reason for your call.   Medication refills: We will need the name of the medication, the dosage, the ordering provider, whether you get a 30 or 90 day refill, and the pharmacy name and address.  Medications will be ordered by the provider only.  Nurses cannot call in prescriptions.  Please allow 7 days for medication refills.  Physician requested updates: If your provider requested that you call with an update after starting medication, please be ready to provide us the medication and dosage, what time you take your medication, the time you attempt to fall asleep, time you fall asleep, when you wake up, and what time you get out of bed.  Sleep Study Results: We will contact you with sleep study results and/or next steps after the physician has reviewed your testing.      MARLIN Gusman  [facility]      Portions of the record may have been created with voice recognition software. Occasional wrong word or \"sound a like\" substitutions may have occurred due to the " inherent limitations of voice recognition software. Read the chart carefully and recognize, using context, where substitutions have occurred.

## 2024-03-15 NOTE — PATIENT INSTRUCTIONS
1.  Continue use of CPAP equipment nightly  2.  Continue to clean your equipment, as discussed  2.5.  Call if you feel the setting is too strong and we can decrease the upper limit  3.  Contact the Sleep Disorders Center with any questions or concerns prior to your next visit, as needed  4.  Schedule visit for follow-up in 3 months        Nursing Support:  When: Monday through Friday 7A-5PM except holidays  Where: Our direct line is [phone].    If you are having a true emergency please call 911.  In the event that the line is busy or it is after hours please leave a voice message and we will return your call.  Please speak clearly, leaving your full name, birth date, best number to reach you and the reason for your call.   Medication refills: We will need the name of the medication, the dosage, the ordering provider, whether you get a 30 or 90 day refill, and the pharmacy name and address.  Medications will be ordered by the provider only.  Nurses cannot call in prescriptions.  Please allow 7 days for medication refills.  Physician requested updates: If your provider requested that you call with an update after starting medication, please be ready to provide us the medication and dosage, what time you take your medication, the time you attempt to fall asleep, time you fall asleep, when you wake up, and what time you get out of bed.  Sleep Study Results: We will contact you with sleep study results and/or next steps after the physician has reviewed your testing.

## 2024-03-19 ENCOUNTER — TELEPHONE (OUTPATIENT)
Dept: SLEEP CENTER | Facility: CLINIC | Age: 79
End: 2024-03-19

## 2024-03-20 LAB

## 2024-05-07 ENCOUNTER — PROCEDURE VISIT (OUTPATIENT)
Dept: OBGYN CLINIC | Facility: HOSPITAL | Age: 79
End: 2024-05-07
Payer: COMMERCIAL

## 2024-05-07 VITALS
SYSTOLIC BLOOD PRESSURE: 108 MMHG | DIASTOLIC BLOOD PRESSURE: 68 MMHG | BODY MASS INDEX: 30.13 KG/M2 | HEIGHT: 67 IN | WEIGHT: 192 LBS | HEART RATE: 78 BPM

## 2024-05-07 DIAGNOSIS — M25.562 CHRONIC PAIN OF BOTH KNEES: Primary | ICD-10-CM

## 2024-05-07 DIAGNOSIS — M17.0 PRIMARY OSTEOARTHRITIS OF BOTH KNEES: ICD-10-CM

## 2024-05-07 DIAGNOSIS — M25.561 CHRONIC PAIN OF BOTH KNEES: Primary | ICD-10-CM

## 2024-05-07 DIAGNOSIS — G89.29 CHRONIC PAIN OF BOTH KNEES: Primary | ICD-10-CM

## 2024-05-07 PROCEDURE — 20610 DRAIN/INJ JOINT/BURSA W/O US: CPT | Performed by: ORTHOPAEDIC SURGERY

## 2024-05-07 RX ORDER — HYALURONATE SODIUM 10 MG/ML
20 SYRINGE (ML) INTRAARTICULAR
Status: COMPLETED | OUTPATIENT
Start: 2024-05-07 | End: 2024-05-07

## 2024-05-07 RX ADMIN — Medication 20 MG: at 13:30

## 2024-05-07 NOTE — PROGRESS NOTES
Assessment:  1. Chronic pain of both knees        2. Primary osteoarthritis of both knees            Plan:  1st bilateral Euflexxa  The patient was provided with 1st bilateral Euflexxa injections.  The patient tolerated the procedure well.    The patient should follow up in one week      To do next visit:  Return in about 1 week (around 5/14/2024) for 2nd bilateral Euflexxa .    The above stated was discussed in layman's terms and the patient expressed understanding.  All questions were answered to the patient's satisfaction.       Scribe Attestation      I,:  Lenny Martinez am acting as a scribe while in the presence of the attending physician.:       I,:  Maximiliano Shine MD personally performed the services described in this documentation    as scribed in my presence.:               Subjective:   Yumi Gaspar is a 78 y.o. female who presents for follow up of bilateral knees and 1st bilateral Euflexxa.  Today she complains of bilateral generalized knee pain.  Prolonged weight bearing and repetitive bending aggravates while rest alleviates.  The patient rates their pain at 7/10 and above at times.      Review of systems negative unless otherwise specified in HPI    Past Medical History:   Diagnosis Date    Basal cell carcinoma (BCC) in situ of skin     on back    GERD (gastroesophageal reflux disease)     Hyperlipidemia     Hypertension     Incisional hernia     Uterine polyp        Past Surgical History:   Procedure Laterality Date    COLOSTOMY  2011    partial, end, distal segment closure    ENTEROSTOMY CLOSURE      with anastomosis    EXAMINATION UNDER ANESTHESIA N/A 1/13/2022    Procedure: EXAM UNDER ANESTHESIA (EUA) W/ D&C HYSTEROSCOPY SYMPHION;  Surgeon: Astrid Melton DO;  [facility]: [facility];  Service: Gynecology    HERNIA REPAIR  2012/2013/2014    HERNIA REPAIR      Pt states with a strangled hernia    HIP SURGERY Right 2010    HIP SURGERY Left 2011    INCISIONAL HERNIA REPAIR       Incarcerated    ND DILATION VAGINA W/ANESTHESIA OTHER THAN LOCAL N/A 12/14/2020    Procedure: EXAM UNDER ANESTHESIA (EUA);  Surgeon: Astrid Melton DO;  [facility]: [facility];  Service: Gynecology    ND HYSTEROSCOPY BX ENDOMETRIUM&/POLYPC W/WO D&C N/A 12/14/2020    Procedure: DILATATION AND CURETTAGE (D&C) WITH HYSTEROSCOPY;  Surgeon: Astrid Melton DO;  [facility];  Service: Gynecology    ND HYSTEROSCOPY REMOVAL LEIOMYOMATA N/A 12/14/2020    Procedure: endometrial polypectomy;  Surgeon: Astrid Melton DO;  [facility];  Service: Gynecology    TOTAL HIP ARTHROPLASTY Bilateral     2011, 2012       Family History   Problem Relation Age of Onset    Heart disease Mother     Coronary artery disease Mother     Stroke Mother     Hypertension Mother     Heart failure Father     Coronary artery disease Father     Colon cancer Maternal Uncle 60    No Known Problems Maternal Grandmother     No Known Problems Maternal Grandfather     No Known Problems Paternal Grandmother     No Known Problems Paternal Grandfather     No Known Problems Son     No Known Problems Son        Social History     Occupational History    Not on file   Tobacco Use    Smoking status: Former    Smokeless tobacco: Never    Tobacco comments:     Pt states with hx 40 years ago   Vaping Use    Vaping status: Never Used   Substance and Sexual Activity    Alcohol use: Yes     Comment: weekly, occasional use    Drug use: Never    Sexual activity: Not Currently         Current Outpatient Medications:     ALPRAZolam (XANAX) 0.25 mg tablet, TAKE 1 TO 2 TABLETS BY MOUTH 30 MINUTES PRIOR TO PROCEDURE (Patient not taking: Reported on 6/5/2023), Disp: , Rfl:     amLODIPine (NORVASC) 5 mg tablet, Take 5 mg by mouth daily, Disp: , Rfl: 3    Ascorbic Acid (VITAMIN C PO), Take 1 tablet by mouth daily, Disp: , Rfl:     aspirin 81 MG tablet, Take 81 mg by mouth if needed (Patient not taking: Reported on 6/5/2023), Disp: , Rfl:     B Complex  "Vitamins (VITAMIN B COMPLEX) TABS, Take 1 tablet by mouth daily, Disp: , Rfl:     Cholecalciferol 50 MCG (2000 UT) CAPS, Take by mouth daily, Disp: , Rfl:     Coenzyme Q10 (CO Q 10 PO), Take 1 tablet by mouth daily , Disp: , Rfl:     escitalopram (LEXAPRO) 5 mg tablet, Take 5 mg by mouth daily, Disp: , Rfl:     Probiotic Product (ALIGN PO), Take 1 tablet by mouth daily , Disp: , Rfl:     rosuvastatin (CRESTOR) 5 mg tablet, Take 5 mg by mouth every other day, Disp: , Rfl:     tobramycin-dexamethasone (TOBRADEX) ophthalmic suspension, , Disp: , Rfl:     No Known Allergies         Vitals:    05/07/24 1332   BP: 108/68   Pulse: 78       Objective:  Physical exam  General: Awake, Alert, Oriented  Eyes: Pupils equal, round and reactive to light  Heart: regular rate and rhythm  Lungs: No audible wheezing  Abdomen: soft                    Ortho Exam  Bilateral knees:  No erythema or ecchymosis  No effusion or swelling  Normal strength  Good ROM with crepitus   Calf compartments soft and supple  Sensation intact  Toes are warm sensate and mobile      Diagnostics, reviewed and taken today if performed as documented:    None performed     Procedures, if performed today:    Large joint arthrocentesis: R knee  Universal Protocol:  Consent: Verbal consent obtained.  Risks and benefits: risks, benefits and alternatives were discussed  Consent given by: patient  Time out: Immediately prior to procedure a \"time out\" was called to verify the correct patient, procedure, equipment, support staff and site/side marked as required.  Timeout called at: 5/7/2024 1:53 PM.  Patient understanding: patient states understanding of the procedure being performed  Site marked: the operative site was marked  Patient identity confirmed: verbally with patient  Supporting Documentation  Indications: pain   Procedure Details  Location: knee - R knee  Preparation: Patient was prepped and draped in the usual sterile fashion  Needle size: 22 G  Ultrasound " "guidance: no  Approach: anterolateral  Medications administered: 20 mg Sodium Hyaluronate (Viscosup) 20 MG/2ML  Specialty Pharmacy Supplied: received medications from pharmacy  Patient tolerance: patient tolerated the procedure well with no immediate complications  Dressing:  Sterile dressing applied      Large joint arthrocentesis: L knee  Universal Protocol:  Consent: Verbal consent obtained.  Risks and benefits: risks, benefits and alternatives were discussed  Consent given by: patient  Time out: Immediately prior to procedure a \"time out\" was called to verify the correct patient, procedure, equipment, support staff and site/side marked as required.  Timeout called at: 5/7/2024 2:01 PM.  Patient understanding: patient states understanding of the procedure being performed  Site marked: the operative site was marked  Patient identity confirmed: verbally with patient  Supporting Documentation  Indications: pain   Procedure Details  Location: knee - L knee  Preparation: Patient was prepped and draped in the usual sterile fashion  Needle size: 22 G  Ultrasound guidance: no  Approach: anterolateral  Medications administered: 20 mg Sodium Hyaluronate (Viscosup) 20 MG/2ML  Specialty Pharmacy Supplied: received medications from pharmacy  Patient tolerance: patient tolerated the procedure well with no immediate complications  Dressing:  Sterile dressing applied            Portions of the record may have been created with voice recognition software.  Occasional wrong word or \"sound a like\" substitutions may have occurred due to the inherent limitations of voice recognition software.  Read the chart carefully and recognize, using context, where substitutions have occurred.    "

## 2024-05-14 ENCOUNTER — PROCEDURE VISIT (OUTPATIENT)
Dept: OBGYN CLINIC | Facility: HOSPITAL | Age: 79
End: 2024-05-14
Payer: COMMERCIAL

## 2024-05-14 VITALS
BODY MASS INDEX: 30.07 KG/M2 | HEIGHT: 67 IN | SYSTOLIC BLOOD PRESSURE: 122 MMHG | HEART RATE: 85 BPM | DIASTOLIC BLOOD PRESSURE: 76 MMHG

## 2024-05-14 DIAGNOSIS — M17.12 PRIMARY OSTEOARTHRITIS OF LEFT KNEE: Primary | ICD-10-CM

## 2024-05-14 DIAGNOSIS — M17.11 PRIMARY OSTEOARTHRITIS OF RIGHT KNEE: ICD-10-CM

## 2024-05-14 PROCEDURE — 20610 DRAIN/INJ JOINT/BURSA W/O US: CPT | Performed by: ORTHOPAEDIC SURGERY

## 2024-05-14 RX ORDER — HYALURONATE SODIUM 10 MG/ML
20 SYRINGE (ML) INTRAARTICULAR
Status: COMPLETED | OUTPATIENT
Start: 2024-05-14 | End: 2024-05-14

## 2024-05-14 RX ORDER — AMOXICILLIN 875 MG/1
875 TABLET, COATED ORAL 2 TIMES DAILY
COMMUNITY
Start: 2024-05-08 | End: 2024-05-15

## 2024-05-14 RX ORDER — FLUTICASONE PROPIONATE 50 MCG
2 SPRAY, SUSPENSION (ML) NASAL DAILY
COMMUNITY
Start: 2024-05-08

## 2024-05-14 RX ADMIN — Medication 20 MG: at 14:00

## 2024-05-14 NOTE — PROGRESS NOTES
78 y.o.female here for injection 2 of 3 of viscosupplementation of both knees  Pain score today is 7 out of 10 on the right left knee    Review of Systems  Review of systems negative unless otherwise specified in HPI    Past Medical History  Past Medical History:   Diagnosis Date    Basal cell carcinoma (BCC) in situ of skin     on back    GERD (gastroesophageal reflux disease)     Hyperlipidemia     Hypertension     Incisional hernia     Uterine polyp        Past Surgical History  Past Surgical History:   Procedure Laterality Date    COLOSTOMY  2011    partial, end, distal segment closure    ENTEROSTOMY CLOSURE      with anastomosis    EXAMINATION UNDER ANESTHESIA N/A 1/13/2022    Procedure: EXAM UNDER ANESTHESIA (EUA) W/ D&C HYSTEROSCOPY SYMPHION;  Surgeon: Astrid Melton DO;  [facility];  Service: Gynecology    HERNIA REPAIR  2012/2013/2014    HERNIA REPAIR      Pt states with a strangled hernia    HIP SURGERY Right 2010    HIP SURGERY Left 2011    INCISIONAL HERNIA REPAIR      Incarcerated    AL DILATION VAGINA W/ANESTHESIA OTHER THAN LOCAL N/A 12/14/2020    Procedure: EXAM UNDER ANESTHESIA (EUA);  Surgeon: Astrid Melton DO;  [facility];  Service: Gynecology    AL HYSTEROSCOPY BX ENDOMETRIUM&/POLYPC W/WO D&C N/A 12/14/2020    Procedure: DILATATION AND CURETTAGE (D&C) WITH HYSTEROSCOPY;  Surgeon: Astrid Melton DO;  [facility];  Service: Gynecology    AL HYSTEROSCOPY REMOVAL LEIOMYOMATA N/A 12/14/2020    Procedure: endometrial polypectomy;  Surgeon: Astrid Melton DO;  [facility];  Service: Gynecology    TOTAL HIP ARTHROPLASTY Bilateral     2011, 2012       Current Medications  Current Outpatient Medications on File Prior to Visit   Medication Sig Dispense Refill    amoxicillin (AMOXIL) 875 mg tablet Take 875 mg by mouth 2 (two) times a day      dextromethorphan-guaifenesin (MUCINEX DM)  MG per 12 hr tablet Take twice a day      fluticasone (FLONASE) 50  mcg/act nasal spray 2 sprays into each nostril daily      ALPRAZolam (XANAX) 0.25 mg tablet TAKE 1 TO 2 TABLETS BY MOUTH 30 MINUTES PRIOR TO PROCEDURE (Patient not taking: Reported on 6/5/2023)      amLODIPine (NORVASC) 5 mg tablet Take 5 mg by mouth daily  3    Ascorbic Acid (VITAMIN C PO) Take 1 tablet by mouth daily      aspirin 81 MG tablet Take 81 mg by mouth if needed (Patient not taking: Reported on 6/5/2023)      B Complex Vitamins (VITAMIN B COMPLEX) TABS Take 1 tablet by mouth daily      Cholecalciferol 50 MCG (2000 UT) CAPS Take by mouth daily      Coenzyme Q10 (CO Q 10 PO) Take 1 tablet by mouth daily       escitalopram (LEXAPRO) 5 mg tablet Take 5 mg by mouth daily      Probiotic Product (ALIGN PO) Take 1 tablet by mouth daily       rosuvastatin (CRESTOR) 5 mg tablet Take 5 mg by mouth every other day      tobramycin-dexamethasone (TOBRADEX) ophthalmic suspension        No current facility-administered medications on file prior to visit.       Recent Labs (HCT,HGB,PT,INR,ESR,CRP,GLU,HgA1C)  0   Lab Value Date/Time    HCT 44.4 07/02/2021 2010    HCT 38.9 03/16/2015 0917    HGB 14.7 07/02/2021 2010    HGB 13.0 03/16/2015 0917    WBC 7.99 07/02/2021 2010    WBC 6.14 03/16/2015 0917    CRP <3.0 05/06/2022 1225    GLUCOSE 109 03/16/2015 0917    HGBA1C 5.3 04/19/2019 1210         Physical exam  General: Awake, Alert, Oriented  Eyes: Pupils equal, round and reactive to light  Heart: regular rate and rhythm  Lungs: No audible wheezing  Abdomen: soft  Knee the knee is effused, neither knee is erythema    Imaging  No new x-rays accompany her    Procedure  Large joint arthrocentesis: R knee  Universal Protocol:  Consent given by: patient  Supporting Documentation  Indications: pain   Procedure Details  Location: knee - R knee  Needle size: 22 G  Ultrasound guidance: no  Medications administered: 20 mg Sodium Hyaluronate (Viscosup) 20 MG/2ML  Specialty Pharmacy Supplied: received medications from pharmacy  Patient  tolerance: patient tolerated the procedure well with no immediate complications  Dressing:  Sterile dressing applied      Large joint arthrocentesis: L knee  Universal Protocol:  Consent given by: patient  Supporting Documentation  Indications: pain   Procedure Details  Location: knee - L knee  Needle size: 22 G  Medications administered: 20 mg Sodium Hyaluronate (Viscosup) 20 MG/2ML  Specialty Pharmacy Supplied: received medications from pharmacy  Patient tolerance: patient tolerated the procedure well with no immediate complications  Dressing:  Sterile dressing applied            Assessment/Plan:   78 y.o.female here for ongoing viscosupplementation.  It is advised, accepted, administer as outlined above.  Office follow-up 1 week time is my recommendation.  That visit the third shot of viscosupplementation can be provided for both knees

## 2024-05-21 ENCOUNTER — PROCEDURE VISIT (OUTPATIENT)
Dept: OBGYN CLINIC | Facility: HOSPITAL | Age: 79
End: 2024-05-21
Payer: COMMERCIAL

## 2024-05-21 VITALS
BODY MASS INDEX: 30.07 KG/M2 | DIASTOLIC BLOOD PRESSURE: 79 MMHG | HEIGHT: 67 IN | SYSTOLIC BLOOD PRESSURE: 142 MMHG | HEART RATE: 73 BPM

## 2024-05-21 DIAGNOSIS — M17.12 PRIMARY OSTEOARTHRITIS OF LEFT KNEE: ICD-10-CM

## 2024-05-21 DIAGNOSIS — M17.11 PRIMARY OSTEOARTHRITIS OF RIGHT KNEE: ICD-10-CM

## 2024-05-21 DIAGNOSIS — M54.16 RADICULOPATHY, LUMBAR REGION: ICD-10-CM

## 2024-05-21 DIAGNOSIS — M25.562 CHRONIC PAIN OF BOTH KNEES: Primary | ICD-10-CM

## 2024-05-21 DIAGNOSIS — M25.561 CHRONIC PAIN OF BOTH KNEES: Primary | ICD-10-CM

## 2024-05-21 DIAGNOSIS — G89.29 CHRONIC PAIN OF BOTH KNEES: Primary | ICD-10-CM

## 2024-05-21 DIAGNOSIS — R29.898 MUSCULAR DECONDITIONING: ICD-10-CM

## 2024-05-21 PROCEDURE — 20610 DRAIN/INJ JOINT/BURSA W/O US: CPT | Performed by: ORTHOPAEDIC SURGERY

## 2024-05-21 RX ORDER — HYALURONATE SODIUM 10 MG/ML
20 SYRINGE (ML) INTRAARTICULAR
Status: COMPLETED | OUTPATIENT
Start: 2024-05-21 | End: 2024-05-21

## 2024-05-21 RX ADMIN — Medication 20 MG: at 15:15

## 2024-05-21 NOTE — PROGRESS NOTES
Assessment:  1. Chronic pain of both knees  Ambulatory referral to Physical Therapy      2. Primary osteoarthritis of right knee  Ambulatory referral to Physical Therapy      3. Primary osteoarthritis of left knee  Ambulatory referral to Physical Therapy      4. Muscular deconditioning  Ambulatory referral to Physical Therapy      5. Radiculopathy, lumbar region  Ambulatory referral to Physical Therapy          Plan:  3rd bilateral Euflexxa  Right ankle DF weakness  The patient was provided with 3rd bilateral Euflexxa injections.  The patient tolerated the procedure well.    Start physical therapy for right ankle dorsiflexion weakness stemming from lumbar spine  The patient should follow up in 3 months.      To do next visit:  Return in about 3 months (around 8/21/2024).    The above stated was discussed in layman's terms and the patient expressed understanding.  All questions were answered to the patient's satisfaction.       Scribe Attestation      I,:  Lenny Martinez am acting as a scribe while in the presence of the attending physician.:       I,:  Maximiliano Shine MD personally performed the services described in this documentation    as scribed in my presence.:               Subjective:   Yumi Gaspar is a 78 y.o. female who presents 3rd bilateral Euflexxa.  Today she complains of bilateral generalized knee pain.  Prolonged weight bearing and repetitive bending aggravates while rest alleviates.  The patient rates their pain at 7/10 and above at times.       Review of systems negative unless otherwise specified in HPI    Past Medical History:   Diagnosis Date    Basal cell carcinoma (BCC) in situ of skin     on back    GERD (gastroesophageal reflux disease)     Hyperlipidemia     Hypertension     Incisional hernia     Uterine polyp        Past Surgical History:   Procedure Laterality Date    COLOSTOMY  2011    partial, end, distal segment closure    ENTEROSTOMY CLOSURE      with anastomosis     EXAMINATION UNDER ANESTHESIA N/A 1/13/2022    Procedure: EXAM UNDER ANESTHESIA (EUA) W/ D&C HYSTEROSCOPY SYMPHION;  Surgeon: Astrid Melton DO;  [facility]: [facility];  Service: Gynecology    HERNIA REPAIR  2012/2013/2014    HERNIA REPAIR      Pt states with a strangled hernia    HIP SURGERY Right 2010    HIP SURGERY Left 2011    INCISIONAL HERNIA REPAIR      Incarcerated    DE DILATION VAGINA W/ANESTHESIA OTHER THAN LOCAL N/A 12/14/2020    Procedure: EXAM UNDER ANESTHESIA (EUA);  Surgeon: Astrid Melton DO;  [facility]: [facility];  Service: Gynecology    DE HYSTEROSCOPY BX ENDOMETRIUM&/POLYPC W/WO D&C N/A 12/14/2020    Procedure: DILATATION AND CURETTAGE (D&C) WITH HYSTEROSCOPY;  Surgeon: Astrid Melton DO;  [facility];  Service: Gynecology    DE HYSTEROSCOPY REMOVAL LEIOMYOMATA N/A 12/14/2020    Procedure: endometrial polypectomy;  Surgeon: Astrid Melton DO;  [facility];  Service: Gynecology    TOTAL HIP ARTHROPLASTY Bilateral     2011, 2012       Family History   Problem Relation Age of Onset    Heart disease Mother     Coronary artery disease Mother     Stroke Mother     Hypertension Mother     Heart failure Father     Coronary artery disease Father     Colon cancer Maternal Uncle 60    No Known Problems Maternal Grandmother     No Known Problems Maternal Grandfather     No Known Problems Paternal Grandmother     No Known Problems Paternal Grandfather     No Known Problems Son     No Known Problems Son        Social History     Occupational History    Not on file   Tobacco Use    Smoking status: Former    Smokeless tobacco: Never    Tobacco comments:     Pt states with hx 40 years ago   Vaping Use    Vaping status: Never Used   Substance and Sexual Activity    Alcohol use: Yes     Comment: weekly, occasional use    Drug use: Never    Sexual activity: Not Currently         Current Outpatient Medications:     ALPRAZolam (XANAX) 0.25 mg tablet, TAKE 1 TO 2 TABLETS BY MOUTH 30 MINUTES  PRIOR TO PROCEDURE (Patient not taking: Reported on 6/5/2023), Disp: , Rfl:     amLODIPine (NORVASC) 5 mg tablet, Take 5 mg by mouth daily, Disp: , Rfl: 3    Ascorbic Acid (VITAMIN C PO), Take 1 tablet by mouth daily, Disp: , Rfl:     aspirin 81 MG tablet, Take 81 mg by mouth if needed (Patient not taking: Reported on 6/5/2023), Disp: , Rfl:     B Complex Vitamins (VITAMIN B COMPLEX) TABS, Take 1 tablet by mouth daily, Disp: , Rfl:     Cholecalciferol 50 MCG (2000 UT) CAPS, Take by mouth daily, Disp: , Rfl:     Coenzyme Q10 (CO Q 10 PO), Take 1 tablet by mouth daily , Disp: , Rfl:     dextromethorphan-guaifenesin (MUCINEX DM)  MG per 12 hr tablet, Take twice a day, Disp: , Rfl:     escitalopram (LEXAPRO) 5 mg tablet, Take 5 mg by mouth daily, Disp: , Rfl:     fluticasone (FLONASE) 50 mcg/act nasal spray, 2 sprays into each nostril daily, Disp: , Rfl:     Probiotic Product (ALIGN PO), Take 1 tablet by mouth daily , Disp: , Rfl:     rosuvastatin (CRESTOR) 5 mg tablet, Take 5 mg by mouth every other day, Disp: , Rfl:     tobramycin-dexamethasone (TOBRADEX) ophthalmic suspension, , Disp: , Rfl:     No Known Allergies         Vitals:    05/21/24 1509   BP: 142/79   Pulse: 73       Objective:  Physical exam  General: Awake, Alert, Oriented  Eyes: Pupils equal, round and reactive to light  Heart: regular rate and rhythm  Lungs: No audible wheezing  Abdomen: soft                    Ortho Exam  Bilateral knees:  No erythema or ecchymosis  No effusion or swelling  Normal strength  Good ROM with crepitus   Calf compartments soft and supple  Sensation intact  Toes are warm sensate and mobile    Lumbar:Right ankle DF 4/5    Diagnostics, reviewed and taken today if performed as documented:    None performed     Procedures, if performed today:    Large joint arthrocentesis: R knee  Universal Protocol:  Consent: Verbal consent obtained.  Risks and benefits: risks, benefits and alternatives were discussed  Consent given by:  "patient  Time out: Immediately prior to procedure a \"time out\" was called to verify the correct patient, procedure, equipment, support staff and site/side marked as required.  Timeout called at: 5/21/2024 3:41 PM.  Patient understanding: patient states understanding of the procedure being performed  Site marked: the operative site was marked  Patient identity confirmed: verbally with patient  Supporting Documentation  Indications: pain   Procedure Details  Location: knee - R knee  Preparation: Patient was prepped and draped in the usual sterile fashion  Needle size: 22 G  Ultrasound guidance: no  Approach: anterolateral  Medications administered: 20 mg Sodium Hyaluronate (Viscosup) 20 MG/2ML  Specialty Pharmacy Supplied: received medications from pharmacy  Patient tolerance: patient tolerated the procedure well with no immediate complications  Dressing:  Sterile dressing applied      Large joint arthrocentesis: L knee  Universal Protocol:  Consent: Verbal consent obtained.  Risks and benefits: risks, benefits and alternatives were discussed  Consent given by: patient  Time out: Immediately prior to procedure a \"time out\" was called to verify the correct patient, procedure, equipment, support staff and site/side marked as required.  Timeout called at: 5/21/2024 3:41 PM.  Patient understanding: patient states understanding of the procedure being performed  Site marked: the operative site was marked  Patient identity confirmed: verbally with patient  Supporting Documentation  Indications: pain   Procedure Details  Location: knee - L knee  Preparation: Patient was prepped and draped in the usual sterile fashion  Needle size: 22 G  Ultrasound guidance: no  Approach: anterolateral  Medications administered: 20 mg Sodium Hyaluronate (Viscosup) 20 MG/2ML  Specialty Pharmacy Supplied: received medications from pharmacy  Patient tolerance: patient tolerated the procedure well with no immediate complications  Dressing:  " "Sterile dressing applied              Portions of the record may have been created with voice recognition software.  Occasional wrong word or \"sound a like\" substitutions may have occurred due to the inherent limitations of voice recognition software.  Read the chart carefully and recognize, using context, where substitutions have occurred.    "

## 2024-06-06 ENCOUNTER — OFFICE VISIT (OUTPATIENT)
Dept: OBGYN CLINIC | Facility: CLINIC | Age: 79
End: 2024-06-06
Payer: COMMERCIAL

## 2024-06-06 VITALS
HEIGHT: 66 IN | WEIGHT: 192.6 LBS | DIASTOLIC BLOOD PRESSURE: 68 MMHG | BODY MASS INDEX: 30.95 KG/M2 | SYSTOLIC BLOOD PRESSURE: 122 MMHG

## 2024-06-06 DIAGNOSIS — N83.202 LEFT OVARIAN CYST: Primary | ICD-10-CM

## 2024-06-06 PROCEDURE — 99213 OFFICE O/P EST LOW 20 MIN: CPT | Performed by: OBSTETRICS & GYNECOLOGY

## 2024-06-06 NOTE — PROGRESS NOTES
Ambulatory Visit  Name: Yumi Gaspar      : 1945      MRN: [id number]  Encounter Provider: Astrid Melton DO  Encounter Date: 2024   Encounter department: OB GYN [facility]    Assessment & Plan   1. Left ovarian cyst    Patient reassured, pelvic exam.  Continue annual mammogram.  Return to office in 1 year or as needed    History of Present Illness     Yumi Gaspar is a 78 y.o. female who presents this is a pleasant 78-year-old female P2 ( x 2)    Presents for follow-up.  She had a long history of small left ovarian cyst, asymptomatic.  She went through menopause at age 43.  She is never been on hormones.  She denies any vaginal bleeding or spotting.  No changes in bowel or bladder function.  She is  23 years.  She has not been sexually active.  She does follow-up with her family physician on a regular basis.  Colonoscopy   DEXA scan  revealing osteopenia.    Review of Systems   Constitutional:  Negative for fatigue, fever and unexpected weight change.   Respiratory:  Negative for cough, chest tightness, shortness of breath and wheezing.    Cardiovascular: Negative.  Negative for chest pain and palpitations.   Gastrointestinal: Negative.  Negative for abdominal distention, abdominal pain, blood in stool, constipation, diarrhea, nausea and vomiting.   Genitourinary: Negative.  Negative for difficulty urinating, dyspareunia, dysuria, flank pain, frequency, genital sores, hematuria, pelvic pain, urgency, vaginal bleeding, vaginal discharge and vaginal pain.   Skin:  Negative for rash.     Current Outpatient Medications on File Prior to Visit   Medication Sig Dispense Refill    amLODIPine (NORVASC) 5 mg tablet Take 5 mg by mouth daily  3    Ascorbic Acid (VITAMIN C PO) Take 1 tablet by mouth daily      B Complex Vitamins (VITAMIN B COMPLEX) TABS Take 1 tablet by mouth daily      CALCIUM PO Take by mouth      Cholecalciferol 50 MCG (2000) CAPS Take by mouth daily   "    Coenzyme Q10 (CO Q 10 PO) Take 1 tablet by mouth daily       escitalopram (LEXAPRO) 5 mg tablet Take 5 mg by mouth daily      MAGNESIUM PO Take by mouth      Probiotic Product (ALIGN PO) Take 1 tablet by mouth daily       rosuvastatin (CRESTOR) 5 mg tablet Take 5 mg by mouth every other day      ALPRAZolam (XANAX) 0.25 mg tablet TAKE 1 TO 2 TABLETS BY MOUTH 30 MINUTES PRIOR TO PROCEDURE (Patient not taking: Reported on 6/5/2023)      aspirin 81 MG tablet Take 81 mg by mouth if needed (Patient not taking: Reported on 6/5/2023)      dextromethorphan-guaifenesin (MUCINEX DM)  MG per 12 hr tablet Take twice a day (Patient not taking: Reported on 6/6/2024)      fluticasone (FLONASE) 50 mcg/act nasal spray 2 sprays into each nostril daily (Patient not taking: Reported on 6/6/2024)      tobramycin-dexamethasone (TOBRADEX) ophthalmic suspension  (Patient not taking: Reported on 6/6/2024)       No current facility-administered medications on file prior to visit.      Objective     /68   Ht 5' 6\" (1.676 m)   Wt 87.4 kg (192 lb 9.6 oz)   BMI 31.09 kg/m²     Physical Exam  Pulmonary:      Effort: Pulmonary effort is normal.   Abdominal:      General: Bowel sounds are normal. There is no distension.      Palpations: Abdomen is soft.      Tenderness: There is no abdominal tenderness. There is no guarding or rebound.   Genitourinary:     Labia:         Right: No rash, tenderness or lesion.         Left: No rash, tenderness or lesion.       Vagina: No signs of injury. No vaginal discharge or tenderness.      Cervix: No cervical motion tenderness, discharge, friability or lesion.      Uterus: Not enlarged and not tender.       Adnexa:         Right: No mass or tenderness.          Left: No mass or tenderness.     Psychiatric:         Behavior: Behavior normal.       Administrative Statements   I have spent a total time of 20 minutes on 06/06/24 In caring for this patient including Instructions for management, " Impressions, Counseling / Coordination of care, Documenting in the medical record, Reviewing / ordering tests, medicine, procedures  , and Obtaining or reviewing history  .

## 2024-06-14 ENCOUNTER — TELEPHONE (OUTPATIENT)
Age: 79
End: 2024-06-14

## 2024-06-14 NOTE — TELEPHONE ENCOUNTER
Caller: Patient    Doctor/Office: Selam    CB#: [phone]      What needs to be faxed: PT script    ATTN to: [employer]    Fax#: [phone]

## 2024-08-20 ENCOUNTER — OFFICE VISIT (OUTPATIENT)
Dept: OBGYN CLINIC | Facility: HOSPITAL | Age: 79
End: 2024-08-20
Payer: COMMERCIAL

## 2024-08-20 VITALS
SYSTOLIC BLOOD PRESSURE: 134 MMHG | WEIGHT: 192 LBS | BODY MASS INDEX: 30.86 KG/M2 | DIASTOLIC BLOOD PRESSURE: 78 MMHG | HEART RATE: 69 BPM | HEIGHT: 66 IN

## 2024-08-20 DIAGNOSIS — M25.561 CHRONIC PAIN OF BOTH KNEES: Primary | ICD-10-CM

## 2024-08-20 DIAGNOSIS — M19.012 PRIMARY OSTEOARTHRITIS OF LEFT SHOULDER: ICD-10-CM

## 2024-08-20 DIAGNOSIS — M17.12 PRIMARY OSTEOARTHRITIS OF LEFT KNEE: ICD-10-CM

## 2024-08-20 DIAGNOSIS — M25.562 CHRONIC PAIN OF BOTH KNEES: Primary | ICD-10-CM

## 2024-08-20 DIAGNOSIS — G89.29 CHRONIC PAIN OF BOTH KNEES: Primary | ICD-10-CM

## 2024-08-20 DIAGNOSIS — M17.11 PRIMARY OSTEOARTHRITIS OF RIGHT KNEE: ICD-10-CM

## 2024-08-20 PROCEDURE — 20610 DRAIN/INJ JOINT/BURSA W/O US: CPT | Performed by: ORTHOPAEDIC SURGERY

## 2024-08-20 PROCEDURE — 99213 OFFICE O/P EST LOW 20 MIN: CPT | Performed by: ORTHOPAEDIC SURGERY

## 2024-08-20 RX ORDER — BETAMETHASONE SODIUM PHOSPHATE AND BETAMETHASONE ACETATE 3; 3 MG/ML; MG/ML
12 INJECTION, SUSPENSION INTRA-ARTICULAR; INTRALESIONAL; INTRAMUSCULAR; SOFT TISSUE
Status: COMPLETED | OUTPATIENT
Start: 2024-08-20 | End: 2024-08-20

## 2024-08-20 RX ORDER — LIDOCAINE HYDROCHLORIDE 10 MG/ML
2 INJECTION, SOLUTION INFILTRATION; PERINEURAL
Status: COMPLETED | OUTPATIENT
Start: 2024-08-20 | End: 2024-08-20

## 2024-08-20 RX ORDER — BUPIVACAINE HYDROCHLORIDE 2.5 MG/ML
2 INJECTION, SOLUTION INFILTRATION; PERINEURAL
Status: COMPLETED | OUTPATIENT
Start: 2024-08-20 | End: 2024-08-20

## 2024-08-20 RX ADMIN — BETAMETHASONE SODIUM PHOSPHATE AND BETAMETHASONE ACETATE 12 MG: 3; 3 INJECTION, SUSPENSION INTRA-ARTICULAR; INTRALESIONAL; INTRAMUSCULAR; SOFT TISSUE at 16:00

## 2024-08-20 RX ADMIN — BUPIVACAINE HYDROCHLORIDE 2 ML: 2.5 INJECTION, SOLUTION INFILTRATION; PERINEURAL at 16:00

## 2024-08-20 RX ADMIN — LIDOCAINE HYDROCHLORIDE 2 ML: 10 INJECTION, SOLUTION INFILTRATION; PERINEURAL at 16:00

## 2024-08-20 NOTE — PROGRESS NOTES
Assessment:  1. Chronic pain of both knees  Injection procedure prior authorization      2. Primary osteoarthritis of right knee  Injection procedure prior authorization      3. Primary osteoarthritis of left knee  Injection procedure prior authorization      4. Primary osteoarthritis of left shoulder  Large joint arthrocentesis: L subacromial bursa          Plan:  Bilateral knee osteoarthritis  Bilateral knee visco-supplement was ordered as this had provided significant decrease of pain, inflammation and crepitus with last application.  The patient has on-going knee pain and stiffness in which interferes with their daily activity and sleep.  Examination of knee includes crepitus with range of motion.  The patient rates their pain a 10/10 at times.  The patient has tried home exercise program learned from past physical therapy.  Bracing has not provided relief.  The patient has tried oral medications and steroid injections with limited benefit.  The patient has been counseled on weight loss.    The patient should follow up in 3 months.    Left shoulder osteoarthritis  The patient was provided with left glenohumeral steroid injection.  The patient tolerated the procedure well.      To do next visit:  Return in about 3 months (around 11/20/2024) for visco-supplementation.    The above stated was discussed in layman's terms and the patient expressed understanding.  All questions were answered to the patient's satisfaction.       Scribe Attestation      I,:  Lenny Martinez MA am acting as a scribe while in the presence of the attending physician.:       I,:  Maximiliano Shine MD personally performed the services described in this documentation    as scribed in my presence.:               Subjective:   Yumi Gaspar is a 78 y.o. female who presents for follow up of bilateral knees.  She is s/p bilateral Euflexxa with good lasting benefit reducing her ain from 7/10 to 1/10.  Today she complains of bilateral  generalized shoulder pain and onset of left shoulder pain and stiffness.  Reaching aggravates while rest alleviates.        Review of systems negative unless otherwise specified in HPI    Past Medical History:   Diagnosis Date    Basal cell carcinoma (BCC) in situ of skin     on back    GERD (gastroesophageal reflux disease)     Hyperlipidemia     Hypertension     Incisional hernia     Uterine polyp        Past Surgical History:   Procedure Laterality Date    COLOSTOMY  2011    partial, end, distal segment closure    ENTEROSTOMY CLOSURE      with anastomosis    EXAMINATION UNDER ANESTHESIA N/A 1/13/2022    Procedure: EXAM UNDER ANESTHESIA (EUA) W/ D&C HYSTEROSCOPY SYMPHION;  Surgeon: Astrid Melton DO;  [facility];  Service: Gynecology    HERNIA REPAIR  2012/2013/2014    HERNIA REPAIR      Pt states with a strangled hernia    HIP SURGERY Right 2010    HIP SURGERY Left 2011    INCISIONAL HERNIA REPAIR      Incarcerated    IN DILATION VAGINA W/ANESTHESIA OTHER THAN LOCAL N/A 12/14/2020    Procedure: EXAM UNDER ANESTHESIA (EUA);  Surgeon: Astrid Melton DO;  [facility];  Service: Gynecology    IN HYSTEROSCOPY BX ENDOMETRIUM&/POLYPC W/WO D&C N/A 12/14/2020    Procedure: DILATATION AND CURETTAGE (D&C) WITH HYSTEROSCOPY;  Surgeon: Astrid Melton DO;  [facility];  Service: Gynecology    IN HYSTEROSCOPY REMOVAL LEIOMYOMATA N/A 12/14/2020    Procedure: endometrial polypectomy;  Surgeon: Astrid Melton DO;  [facility];  Service: Gynecology    TOTAL HIP ARTHROPLASTY Bilateral     2011, 2012       Family History   Problem Relation Age of Onset    Heart disease Mother     Coronary artery disease Mother     Stroke Mother     Hypertension Mother     Heart failure Father     Coronary artery disease Father     Colon cancer Maternal Uncle 60    No Known Problems Maternal Grandmother     No Known Problems Maternal Grandfather     No Known Problems Paternal Grandmother     No Known  Problems Paternal Grandfather     No Known Problems Son     No Known Problems Son        Social History     Occupational History    Not on file   Tobacco Use    Smoking status: Former    Smokeless tobacco: Never    Tobacco comments:     Pt states with hx 40 years ago   Vaping Use    Vaping status: Never Used   Substance and Sexual Activity    Alcohol use: Yes     Comment: weekly, occasional use    Drug use: Never    Sexual activity: Not Currently         Current Outpatient Medications:     ALPRAZolam (XANAX) 0.25 mg tablet, TAKE 1 TO 2 TABLETS BY MOUTH 30 MINUTES PRIOR TO PROCEDURE (Patient not taking: Reported on 6/5/2023), Disp: , Rfl:     amLODIPine (NORVASC) 5 mg tablet, Take 5 mg by mouth daily, Disp: , Rfl: 3    Ascorbic Acid (VITAMIN C PO), Take 1 tablet by mouth daily, Disp: , Rfl:     aspirin 81 MG tablet, Take 81 mg by mouth if needed (Patient not taking: Reported on 6/5/2023), Disp: , Rfl:     B Complex Vitamins (VITAMIN B COMPLEX) TABS, Take 1 tablet by mouth daily, Disp: , Rfl:     CALCIUM PO, Take by mouth, Disp: , Rfl:     Cholecalciferol 50 MCG (2000 UT) CAPS, Take by mouth daily, Disp: , Rfl:     Coenzyme Q10 (CO Q 10 PO), Take 1 tablet by mouth daily , Disp: , Rfl:     dextromethorphan-guaifenesin (MUCINEX DM)  MG per 12 hr tablet, Take twice a day (Patient not taking: Reported on 6/6/2024), Disp: , Rfl:     escitalopram (LEXAPRO) 5 mg tablet, Take 5 mg by mouth daily, Disp: , Rfl:     fluticasone (FLONASE) 50 mcg/act nasal spray, 2 sprays into each nostril daily (Patient not taking: Reported on 6/6/2024), Disp: , Rfl:     MAGNESIUM PO, Take by mouth, Disp: , Rfl:     Probiotic Product (ALIGN PO), Take 1 tablet by mouth daily , Disp: , Rfl:     rosuvastatin (CRESTOR) 5 mg tablet, Take 5 mg by mouth every other day, Disp: , Rfl:     tobramycin-dexamethasone (TOBRADEX) ophthalmic suspension, , Disp: , Rfl:     No Known Allergies         Vitals:    08/20/24 1605   BP: 134/78   Pulse: 69  "      Objective:  Physical exam  General: Awake, Alert, Oriented  Eyes: Pupils equal, round and reactive to light  Heart: regular rate and rhythm  Lungs: No audible wheezing  Abdomen: soft                    Ortho Exam  Bilateral knees:  No erythema or ecchymosis  No effusion or swelling  Normal strength  Good ROM with crepitus   Calf compartments soft and supple  Sensation intact  Toes are warm sensate and mobile     Lumbar:Right ankle DF 4/5    Left shoulder:    Abduction 100  Supraspinatus 5/5  Infraspinatus 4+/5    Sensation intact distally     Diagnostics, reviewed and taken today if performed as documented:    None performed     Procedures, if performed today:    Large joint arthrocentesis: L glenohumeral  Universal Protocol:  Consent: Verbal consent obtained.  Risks and benefits: risks, benefits and alternatives were discussed  Consent given by: patient  Time out: Immediately prior to procedure a \"time out\" was called to verify the correct patient, procedure, equipment, support staff and site/side marked as required.  Timeout called at: 8/20/2024 4:16 PM.  Patient understanding: patient states understanding of the procedure being performed  Site marked: the operative site was marked  Patient identity confirmed: verbally with patient  Supporting Documentation  Indications: pain   Procedure Details  Location: shoulder - L glenohumeral  Needle size: 25 G  Ultrasound guidance: no  Approach: posterolateral  Medications administered: 2 mL bupivacaine 0.25 %; 12 mg betamethasone acetate-betamethasone sodium phosphate 6 (3-3) mg/mL; 2 mL lidocaine 1 %    Patient tolerance: patient tolerated the procedure well with no immediate complications  Dressing:  Sterile dressing applied              Portions of the record may have been created with voice recognition software.  Occasional wrong word or \"sound a like\" substitutions may have occurred due to the inherent limitations of voice recognition software.  Read the " chart carefully and recognize, using context, where substitutions have occurred.

## 2024-10-10 ENCOUNTER — TELEPHONE (OUTPATIENT)
Dept: OBGYN CLINIC | Facility: HOSPITAL | Age: 79
End: 2024-10-10

## 2024-10-10 NOTE — TELEPHONE ENCOUNTER
Patient already has referral for 11/22/2024 injections. We will call in the medication 10/22/24 per pharmacy guidelines.

## 2024-10-10 NOTE — TELEPHONE ENCOUNTER
Caller: Yumi     Doctor: Rl Arana    Reason for call: Patient called for status of knee injections   Patient informed   NEXT INJS DUE 11/22/24 .  She may call for reminder to order at the end of October     Call back#: [phone]

## 2024-11-26 ENCOUNTER — PROCEDURE VISIT (OUTPATIENT)
Dept: OBGYN CLINIC | Facility: HOSPITAL | Age: 79
End: 2024-11-26
Payer: COMMERCIAL

## 2024-11-26 VITALS
BODY MASS INDEX: 31.02 KG/M2 | HEART RATE: 71 BPM | SYSTOLIC BLOOD PRESSURE: 144 MMHG | DIASTOLIC BLOOD PRESSURE: 83 MMHG | WEIGHT: 193 LBS | HEIGHT: 66 IN

## 2024-11-26 DIAGNOSIS — M17.11 PRIMARY OSTEOARTHRITIS OF RIGHT KNEE: Primary | ICD-10-CM

## 2024-11-26 DIAGNOSIS — M17.12 PRIMARY OSTEOARTHRITIS OF LEFT KNEE: ICD-10-CM

## 2024-11-26 PROCEDURE — 20610 DRAIN/INJ JOINT/BURSA W/O US: CPT | Performed by: ORTHOPAEDIC SURGERY

## 2024-11-26 RX ORDER — HYALURONATE SODIUM 10 MG/ML
20 SYRINGE (ML) INTRAARTICULAR
Status: COMPLETED | OUTPATIENT
Start: 2024-11-26 | End: 2024-11-26

## 2024-11-26 RX ADMIN — Medication 20 MG: at 15:30

## 2024-11-26 NOTE — PROGRESS NOTES
Assessment:   Diagnosis ICD-10-CM Associated Orders   1. Primary osteoarthritis of right knee  M17.11 Large joint arthrocentesis: bilateral knee      2. Primary osteoarthritis of left knee  M17.12 Large joint arthrocentesis: bilateral knee          Plan:  78 y.o. female with bilateral knee osteoarthritis returns for repeat visco injections.  Has obtained great benefit in the past from visco injections, pain score 7/10 today  Bilateral knee euflexa injections, first of 3 provided as below  Ice rest elevation as needed  Return in 1 week for second out of third Euflexxa series     The above stated was discussed in layman's terms and the patient expressed understanding.  All questions were answered to the patient's satisfaction.     To do next visit:  Return in about 1 week (around 12/3/2024).      Subjective:   Yumi Gaspar is a 78 y.o. female who presents for return of weightbearing pain in her bilateral knees.  She receives bilateral knee viscosupplementation injections every 6 months for about the past 4 years with great benefit.  She would like to repeat injections today  Pain score today is 7/10.    Review of systems negative unless otherwise specified in HPI    Past Medical History:   Diagnosis Date    Basal cell carcinoma (BCC) in situ of skin     on back    GERD (gastroesophageal reflux disease)     Hyperlipidemia     Hypertension     Incisional hernia     Uterine polyp        Past Surgical History:   Procedure Laterality Date    COLOSTOMY  2011    partial, end, distal segment closure    ENTEROSTOMY CLOSURE      with anastomosis    EXAMINATION UNDER ANESTHESIA N/A 1/13/2022    Procedure: EXAM UNDER ANESTHESIA (EUA) W/ D&C HYSTEROSCOPY SYMPHION;  Surgeon: Astrid Melton DO;  [facility];  Service: Gynecology    HERNIA REPAIR  2012/2013/2014    HERNIA REPAIR      Pt states with a strangled hernia    HIP SURGERY Right 2010    HIP SURGERY Left 2011    INCISIONAL HERNIA REPAIR      Incarcerated     IL DILATION VAGINA W/ANESTHESIA OTHER THAN LOCAL N/A 12/14/2020    Procedure: EXAM UNDER ANESTHESIA (EUA);  Surgeon: Astrid Melton DO;  [facility];  Service: Gynecology    IL HYSTEROSCOPY BX ENDOMETRIUM&/POLYPC W/WO D&C N/A 12/14/2020    Procedure: DILATATION AND CURETTAGE (D&C) WITH HYSTEROSCOPY;  Surgeon: Astrid Melton DO;  [facility];  Service: Gynecology    IL HYSTEROSCOPY REMOVAL LEIOMYOMATA N/A 12/14/2020    Procedure: endometrial polypectomy;  Surgeon: Astrid Melton DO;  [facility];  Service: Gynecology    TOTAL HIP ARTHROPLASTY Bilateral     2011, 2012       Family History   Problem Relation Age of Onset    Heart disease Mother     Coronary artery disease Mother     Stroke Mother     Hypertension Mother     Heart failure Father     Coronary artery disease Father     Colon cancer Maternal Uncle 60    No Known Problems Maternal Grandmother     No Known Problems Maternal Grandfather     No Known Problems Paternal Grandmother     No Known Problems Paternal Grandfather     No Known Problems Son     No Known Problems Son        Social History     Occupational History    Not on file   Tobacco Use    Smoking status: Former    Smokeless tobacco: Never    Tobacco comments:     Pt states with hx 40 years ago   Vaping Use    Vaping status: Never Used   Substance and Sexual Activity    Alcohol use: Yes     Comment: weekly, occasional use    Drug use: Never    Sexual activity: Not Currently         Current Outpatient Medications:     ALPRAZolam (XANAX) 0.25 mg tablet, TAKE 1 TO 2 TABLETS BY MOUTH 30 MINUTES PRIOR TO PROCEDURE (Patient not taking: Reported on 6/5/2023), Disp: , Rfl:     amLODIPine (NORVASC) 5 mg tablet, Take 5 mg by mouth daily, Disp: , Rfl: 3    Ascorbic Acid (VITAMIN C PO), Take 1 tablet by mouth daily, Disp: , Rfl:     B Complex Vitamins (VITAMIN B COMPLEX) TABS, Take 1 tablet by mouth daily, Disp: , Rfl:     CALCIUM PO, Take by mouth, Disp: , Rfl:      "Cholecalciferol 50 MCG (2000 UT) CAPS, Take by mouth daily, Disp: , Rfl:     Coenzyme Q10 (CO Q 10 PO), Take 1 tablet by mouth daily , Disp: , Rfl:     escitalopram (LEXAPRO) 5 mg tablet, Take 5 mg by mouth daily, Disp: , Rfl:     MAGNESIUM PO, Take by mouth, Disp: , Rfl:     Probiotic Product (ALIGN PO), Take 1 tablet by mouth daily , Disp: , Rfl:     rosuvastatin (CRESTOR) 5 mg tablet, Take 5 mg by mouth every other day, Disp: , Rfl:     tobramycin-dexamethasone (TOBRADEX) ophthalmic suspension, , Disp: , Rfl:     No Known Allergies         Vitals:    11/26/24 1536   BP: 144/83   Pulse: 71       Objective:  Physical exam  General: Awake, Alert, Oriented  Eyes: Pupils equal, round and reactive to light  Heart: regular rate and rhythm  Lungs: No audible wheezing  Abdomen: soft                    Right Knee Exam     Muscle Strength   The patient has normal right knee strength.    Tenderness   The patient is experiencing tenderness in the lateral joint line and medial joint line.    Range of Motion   Extension:  0   Flexion:  120     Other   Erythema: absent  Sensation: normal  Swelling: none  Effusion: no effusion present      Left Knee Exam     Muscle Strength   The patient has normal left knee strength.    Tenderness   The patient is experiencing tenderness in the lateral joint line and medial joint line.    Range of Motion   Extension:  0   Flexion:  120     Other   Erythema: absent  Sensation: normal  Pulse: present  Swelling: none  Effusion: no effusion present            Diagnostics, reviewed and taken today if performed as documented:    None performed      Procedures, if performed today:    Large joint arthrocentesis: bilateral knee  Universal Protocol:  Consent: Verbal consent obtained.  Consent given by: patient  Time out: Immediately prior to procedure a \"time out\" was called to verify the correct patient, procedure, equipment, support staff and site/side marked as required.  Timeout called at: 11/26/2024 " "4:00 PM.  Patient understanding: patient states understanding of the procedure being performed  Patient identity confirmed: verbally with patient  Supporting Documentation  Indications: pain   Procedure Details  Location: knee - bilateral knee  Needle size: 22 G  Ultrasound guidance: no  Approach: anterolateral    Medications (Right): 20 mg Sodium Hyaluronate (Viscosup) 20 MG/2MLMedications (Left): 20 mg Sodium Hyaluronate (Viscosup) 20 MG/2ML   Patient tolerance: patient tolerated the procedure well with no immediate complications  Dressing:  Sterile dressing applied            Portions of the record may have been created with voice recognition software.  Occasional wrong word or \"sound a like\" substitutions may have occurred due to the inherent limitations of voice recognition software.  Read the chart carefully and recognize, using context, where substitutions have occurred.        "

## 2024-12-03 ENCOUNTER — PROCEDURE VISIT (OUTPATIENT)
Dept: OBGYN CLINIC | Facility: HOSPITAL | Age: 79
End: 2024-12-03
Payer: COMMERCIAL

## 2024-12-03 VITALS — HEIGHT: 66 IN | BODY MASS INDEX: 31.02 KG/M2 | WEIGHT: 193 LBS

## 2024-12-03 DIAGNOSIS — G89.29 CHRONIC PAIN OF BOTH KNEES: ICD-10-CM

## 2024-12-03 DIAGNOSIS — M25.562 CHRONIC PAIN OF BOTH KNEES: ICD-10-CM

## 2024-12-03 DIAGNOSIS — M17.0 PRIMARY OSTEOARTHRITIS OF BOTH KNEES: Primary | ICD-10-CM

## 2024-12-03 DIAGNOSIS — M25.561 CHRONIC PAIN OF BOTH KNEES: ICD-10-CM

## 2024-12-03 PROCEDURE — 20610 DRAIN/INJ JOINT/BURSA W/O US: CPT | Performed by: ORTHOPAEDIC SURGERY

## 2024-12-03 RX ORDER — LIDOCAINE HYDROCHLORIDE 10 MG/ML
3 INJECTION, SOLUTION INFILTRATION; PERINEURAL
Status: COMPLETED | OUTPATIENT
Start: 2024-12-03 | End: 2024-12-03

## 2024-12-03 RX ORDER — HYALURONATE SODIUM 10 MG/ML
20 SYRINGE (ML) INTRAARTICULAR
Status: COMPLETED | OUTPATIENT
Start: 2024-12-03 | End: 2024-12-03

## 2024-12-03 RX ADMIN — LIDOCAINE HYDROCHLORIDE 3 ML: 10 INJECTION, SOLUTION INFILTRATION; PERINEURAL at 09:00

## 2024-12-03 RX ADMIN — Medication 20 MG: at 09:00

## 2024-12-03 NOTE — PROGRESS NOTES
Assessment:   Diagnosis ICD-10-CM Associated Orders   1. Primary osteoarthritis of both knees  M17.0 Large joint arthrocentesis: bilateral knee      2. Chronic pain of both knees  M25.561 Large joint arthrocentesis: bilateral knee    M25.562     G89.29           Plan:  Bilateral knees known osteoarthritis and chronic pain.  Both of her knees were injected with the second of 3 Euflexxa Visco injections today.  She tolerated both injections well (RP).  Ice postinjection protocol advised.  Weightbearing and activity as tolerated.  Follow-up next week for the completion of the 3 shot Visco series.    To do next visit:  Return in about 1 week (around 12/10/2024) for re-check both knees and Euflexxa #3.    The above stated was discussed in layman's terms and the patient expressed understanding.  All questions were answered to the patient's satisfaction.       Scribe Attestation      I,:  Marc Pride am acting as a scribe while in the presence of the attending physician.:       I,:  Maximiliano Shine MD personally performed the services described in this documentation    as scribed in my presence.:               Subjective:   Yumi Gaspar is a 78 y.o. female who presents today for repeat evaluation of her bilateral knees, known osteoarthritis.  She returns today for the second installment of the 3 shot Euflexxa Visco injection series.  She finds relief with the Visco injection series.  She tolerated last week's injection well.  Pain scale at times however can still be 7/10.      Review of systems negative unless otherwise specified in HPI  Review of Systems    Past Medical History:   Diagnosis Date    Basal cell carcinoma (BCC) in situ of skin     on back    GERD (gastroesophageal reflux disease)     Hyperlipidemia     Hypertension     Incisional hernia     Uterine polyp        Past Surgical History:   Procedure Laterality Date    COLOSTOMY  2011    partial, end, distal segment closure    ENTEROSTOMY CLOSURE       with anastomosis    EXAMINATION UNDER ANESTHESIA N/A 1/13/2022    Procedure: EXAM UNDER ANESTHESIA (EUA) W/ D&C HYSTEROSCOPY SYMPHION;  Surgeon: Astrid Melton DO;  [facility];  Service: Gynecology    HERNIA REPAIR  2012/2013/2014    HERNIA REPAIR      Pt states with a strangled hernia    HIP SURGERY Right 2010    HIP SURGERY Left 2011    INCISIONAL HERNIA REPAIR      Incarcerated    PA DILATION VAGINA W/ANESTHESIA OTHER THAN LOCAL N/A 12/14/2020    Procedure: EXAM UNDER ANESTHESIA (EUA);  Surgeon: Astrid Melton DO;  [facility];  Service: Gynecology    PA HYSTEROSCOPY BX ENDOMETRIUM&/POLYPC W/WO D&C N/A 12/14/2020    Procedure: DILATATION AND CURETTAGE (D&C) WITH HYSTEROSCOPY;  Surgeon: Astrid Melton DO;  [facility];  Service: Gynecology    PA HYSTEROSCOPY REMOVAL LEIOMYOMATA N/A 12/14/2020    Procedure: endometrial polypectomy;  Surgeon: Astrid Melton DO;  [facility];  Service: Gynecology    TOTAL HIP ARTHROPLASTY Bilateral     2011, 2012       Family History   Problem Relation Age of Onset    Heart disease Mother     Coronary artery disease Mother     Stroke Mother     Hypertension Mother     Heart failure Father     Coronary artery disease Father     Colon cancer Maternal Uncle 60    No Known Problems Maternal Grandmother     No Known Problems Maternal Grandfather     No Known Problems Paternal Grandmother     No Known Problems Paternal Grandfather     No Known Problems Son     No Known Problems Son        Social History     Occupational History    Not on file   Tobacco Use    Smoking status: Former    Smokeless tobacco: Never    Tobacco comments:     Pt states with hx 40 years ago   Vaping Use    Vaping status: Never Used   Substance and Sexual Activity    Alcohol use: Yes     Comment: weekly, occasional use    Drug use: Never    Sexual activity: Not Currently         Current Outpatient Medications:     amLODIPine (NORVASC) 5 mg tablet, Take 5 mg by mouth  daily, Disp: , Rfl: 3    Ascorbic Acid (VITAMIN C PO), Take 1 tablet by mouth daily, Disp: , Rfl:     B Complex Vitamins (VITAMIN B COMPLEX) TABS, Take 1 tablet by mouth daily, Disp: , Rfl:     CALCIUM PO, Take by mouth, Disp: , Rfl:     Cholecalciferol 50 MCG (2000 UT) CAPS, Take by mouth daily, Disp: , Rfl:     Coenzyme Q10 (CO Q 10 PO), Take 1 tablet by mouth daily , Disp: , Rfl:     escitalopram (LEXAPRO) 5 mg tablet, Take 5 mg by mouth daily, Disp: , Rfl:     MAGNESIUM PO, Take by mouth, Disp: , Rfl:     Probiotic Product (ALIGN PO), Take 1 tablet by mouth daily , Disp: , Rfl:     rosuvastatin (CRESTOR) 5 mg tablet, Take 5 mg by mouth every other day, Disp: , Rfl:     ALPRAZolam (XANAX) 0.25 mg tablet, TAKE 1 TO 2 TABLETS BY MOUTH 30 MINUTES PRIOR TO PROCEDURE (Patient not taking: Reported on 12/3/2024), Disp: , Rfl:     tobramycin-dexamethasone (TOBRADEX) ophthalmic suspension, , Disp: , Rfl:     No Known Allergies         Vitals:       Body mass index is 31.15 kg/m².  Wt Readings from Last 3 Encounters:   12/03/24 87.5 kg (193 lb)   11/26/24 87.5 kg (193 lb)   08/20/24 87.1 kg (192 lb)       Objective:                    Right Knee Exam     Muscle Strength   The patient has normal right knee strength.    Tenderness   The patient is experiencing tenderness in the medial joint line.    Range of Motion   Extension:  0   Flexion:  120 (with crepitation and less stiffness)     Other   Erythema: absent  Sensation: normal  Swelling: mild  Effusion: no effusion present      Left Knee Exam     Muscle Strength   The patient has normal left knee strength.    Tenderness   The patient is experiencing tenderness in the medial joint line.    Range of Motion   Extension:  0   Flexion:  120 (with crepitation and less stiffness)     Other   Erythema: absent  Sensation: normal  Swelling: mild  Effusion: no effusion present    Comments:    Both knees are in mild varus alignment            Diagnostics, reviewed and taken today if  "performed as documented:    None performed        Procedures, if performed today:    Large joint arthrocentesis: bilateral knee  Universal Protocol:  Consent: Verbal consent obtained.  Risks and benefits: risks, benefits and alternatives were discussed  Consent given by: patient  Time out: Immediately prior to procedure a \"time out\" was called to verify the correct patient, procedure, equipment, support staff and site/side marked as required.  Timeout called at: 12/3/2024 9:18 AM.  Patient understanding: patient states understanding of the procedure being performed  Site marked: the operative site was marked  Patient identity confirmed: verbally with patient  Supporting Documentation  Indications: pain and diagnostic evaluation   Procedure Details  Location: knee - bilateral knee  Preparation: Patient was prepped and draped in the usual sterile fashion  Needle size: 22 G  Ultrasound guidance: no  Approach: anteromedial    Medications (Right): 20 mg Sodium Hyaluronate (Viscosup) 20 MG/2ML; 3 mL lidocaine 1 %Specialty Pharmacy Supplied (Right): for right side  Medications (Left): 20 mg Sodium Hyaluronate (Viscosup) 20 MG/2ML; 3 mL lidocaine 1 %   Specialty Pharmacy Supplied (Left): for left side  Patient tolerance: patient tolerated the procedure well with no immediate complications  Dressing:  Sterile dressing applied              Portions of the record may have been created with voice recognition software.  Occasional wrong word or \"sound a like\" substitutions may have occurred due to the inherent limitations of voice recognition software.  Read the chart carefully and recognize, using context, where substitutions have occurred.  "

## 2024-12-10 ENCOUNTER — PROCEDURE VISIT (OUTPATIENT)
Dept: OBGYN CLINIC | Facility: HOSPITAL | Age: 79
End: 2024-12-10
Payer: COMMERCIAL

## 2024-12-10 VITALS
HEIGHT: 66 IN | SYSTOLIC BLOOD PRESSURE: 137 MMHG | HEART RATE: 66 BPM | DIASTOLIC BLOOD PRESSURE: 82 MMHG | BODY MASS INDEX: 31.15 KG/M2

## 2024-12-10 DIAGNOSIS — M17.11 PRIMARY OSTEOARTHRITIS OF RIGHT KNEE: Primary | ICD-10-CM

## 2024-12-10 DIAGNOSIS — M17.12 PRIMARY OSTEOARTHRITIS OF LEFT KNEE: ICD-10-CM

## 2024-12-10 PROCEDURE — 20610 DRAIN/INJ JOINT/BURSA W/O US: CPT | Performed by: ORTHOPAEDIC SURGERY

## 2024-12-10 RX ORDER — HYALURONATE SODIUM 10 MG/ML
20 SYRINGE (ML) INTRAARTICULAR
Status: COMPLETED | OUTPATIENT
Start: 2024-12-10 | End: 2024-12-10

## 2024-12-10 RX ADMIN — Medication 20 MG: at 09:45

## 2024-12-10 NOTE — PROGRESS NOTES
"Orthopaedic Surgery - Office Note  Yumi Gaspar (78 y.o. female)   : 1945   MRN: [id number]  Encounter Date: 12/10/2024    Assessment / Plan  Bilateral knee OA, pain worse R>L    Euflexxa injection of bilateral knee was performed  Patient continues to be happy with the relief she gets from the injections   Encouraged her to keep up with low impact exercise as tolerated  Ice, heat and anti-inflammatories prn   Follow-up:  Return in about 3 months (around 3/10/2025).      Chief Complaint / Date of Onset  Bilateral knee OA, chronic   Injury Mechanism / Date  None  Surgery / Date  None    History of Present Illness   Yumi Gaspar is a 78 y.o. female who presents for bilateral knee OA and Euflexxa 3 injections. She does find good relief with Euflexxa injection series. She tolerated last weeks injection well. She is happy with non-surgical treatment at this time and is not interested in a TKA. She offer no new complaints today.    Pain scale 7/10    Treatment Summary  Medications / Modalities  None  Bracing / Immobilization  None  Physical Therapy  Completed in the past with mild relief   Injections  Long standing history of injections, most recent prior to this series was on 2024 with was vsico   Prior Surgeries  None  Other Treatments  None      Review of Systems  Pertinent items are noted in HPI.  All other systems were reviewed and are negative.      Physical Exam  /82   Pulse 66   Ht 5' 6\" (1.676 m)   BMI 31.15 kg/m²   Cons: Appears well.  No apparent distress.  Psych: Alert. Oriented x3.  Mood and affect normal.  Eyes: PERRLA, EOMI  Resp: Normal effort.  No audible wheezing or stridor.  CV: Palpable pulse.  No discernable arrhythmia.  No LE edema.  Lymph:  No palpable cervical, axillary, or inguinal lymphadenopathy.  Skin: Warm.  No palpable masses.  No visible lesions.  Neuro: Normal muscle tone.  Normal and symmetric DTR's.     Bilateral Knee Exam  Alignment:  Normal knee " "alignment.  Inspection:  No swelling. No ecchymosis.  Palpation:  mild joint line tenderness. No effusion.  ROM:  Knee Extension 0. Knee Flexion 125.  Strength:  Able to actively extend knee against gravity.  Stability:  No objective knee instability. Stable Varus / Valgus stress, Lachman, and Posterior drawer.  Tests:  No pertinent positive or negative tests.  Patella:  Patella tracks centrally with crepitus.  Neurovascular:  Sensation intact in DP/SP/Huntley/Sa/T nerve distributions.  2+ DP & PT pulses.  Gait:  Steady.       Studies Reviewed  No studies to review      Large joint arthrocentesis: bilateral knee  Universal Protocol:  Consent given by: patient  Time out: Immediately prior to procedure a \"time out\" was called to verify the correct patient, procedure, equipment, support staff and site/side marked as required.  Site marked: the operative site was marked  Supporting Documentation  Indications: pain   Procedure Details  Location: knee - bilateral knee  Preparation: Patient was prepped and draped in the usual sterile fashion  Needle size: 22 G  Ultrasound guidance: no  Approach: lateral    Medications (Right): 20 mg Sodium Hyaluronate (Viscosup) 20 MG/2MLMedications (Left): 20 mg Sodium Hyaluronate (Viscosup) 20 MG/2ML   Patient tolerance: patient tolerated the procedure well with no immediate complications  Dressing:  Sterile dressing applied            Medical, Surgical, Family, and Social History  The patient's medical history, family history, and social history, were reviewed and updated as appropriate.    Past Medical History:   Diagnosis Date    Basal cell carcinoma (BCC) in situ of skin     on back    GERD (gastroesophageal reflux disease)     Hyperlipidemia     Hypertension     Incisional hernia     Uterine polyp        Past Surgical History:   Procedure Laterality Date    COLOSTOMY  2011    partial, end, distal segment closure    ENTEROSTOMY CLOSURE      with anastomosis    EXAMINATION UNDER ANESTHESIA " N/A 1/13/2022    Procedure: EXAM UNDER ANESTHESIA (EUA) W/ D&C HYSTEROSCOPY SYMPHION;  Surgeon: Astrid Melton DO;  [facility];  Service: Gynecology    HERNIA REPAIR  2012/2013/2014    HERNIA REPAIR      Pt states with a strangled hernia    HIP SURGERY Right 2010    HIP SURGERY Left 2011    INCISIONAL HERNIA REPAIR      Incarcerated    NH DILATION VAGINA W/ANESTHESIA OTHER THAN LOCAL N/A 12/14/2020    Procedure: EXAM UNDER ANESTHESIA (EUA);  Surgeon: Astrid Melton DO;  [facility];  Service: Gynecology    NH HYSTEROSCOPY BX ENDOMETRIUM&/POLYPC W/WO D&C N/A 12/14/2020    Procedure: DILATATION AND CURETTAGE (D&C) WITH HYSTEROSCOPY;  Surgeon: Astrid Melton DO;  [facility];  Service: Gynecology    NH HYSTEROSCOPY REMOVAL LEIOMYOMATA N/A 12/14/2020    Procedure: endometrial polypectomy;  Surgeon: Astrid Melton DO;  [facility];  Service: Gynecology    TOTAL HIP ARTHROPLASTY Bilateral     2011, 2012       Family History   Problem Relation Age of Onset    Heart disease Mother     Coronary artery disease Mother     Stroke Mother     Hypertension Mother     Heart failure Father     Coronary artery disease Father     Colon cancer Maternal Uncle 60    No Known Problems Maternal Grandmother     No Known Problems Maternal Grandfather     No Known Problems Paternal Grandmother     No Known Problems Paternal Grandfather     No Known Problems Son     No Known Problems Son        Social History     Occupational History    Not on file   Tobacco Use    Smoking status: Former    Smokeless tobacco: Never    Tobacco comments:     Pt states with hx 40 years ago   Vaping Use    Vaping status: Never Used   Substance and Sexual Activity    Alcohol use: Yes     Comment: weekly, occasional use    Drug use: Never    Sexual activity: Not Currently       No Known Allergies      Current Outpatient Medications:     ALPRAZolam (XANAX) 0.25 mg tablet, TAKE 1 TO 2 TABLETS BY MOUTH 30 MINUTES PRIOR  TO PROCEDURE (Patient not taking: Reported on 12/3/2024), Disp: , Rfl:     amLODIPine (NORVASC) 5 mg tablet, Take 5 mg by mouth daily, Disp: , Rfl: 3    Ascorbic Acid (VITAMIN C PO), Take 1 tablet by mouth daily, Disp: , Rfl:     B Complex Vitamins (VITAMIN B COMPLEX) TABS, Take 1 tablet by mouth daily, Disp: , Rfl:     CALCIUM PO, Take by mouth, Disp: , Rfl:     Cholecalciferol 50 MCG (2000 UT) CAPS, Take by mouth daily, Disp: , Rfl:     Coenzyme Q10 (CO Q 10 PO), Take 1 tablet by mouth daily , Disp: , Rfl:     escitalopram (LEXAPRO) 5 mg tablet, Take 5 mg by mouth daily, Disp: , Rfl:     MAGNESIUM PO, Take by mouth, Disp: , Rfl:     Probiotic Product (ALIGN PO), Take 1 tablet by mouth daily , Disp: , Rfl:     rosuvastatin (CRESTOR) 5 mg tablet, Take 5 mg by mouth every other day, Disp: , Rfl:     tobramycin-dexamethasone (TOBRADEX) ophthalmic suspension, , Disp: , Rfl:       Ginger Armenta    Scribe Attestation      I,:  Ginger Armenta am acting as a scribe while in the presence of the attending physician.:       I,:  Maximiliano Shine MD personally performed the services described in this documentation    as scribed in my presence.:

## 2024-12-11 ENCOUNTER — HOSPITAL ENCOUNTER (OUTPATIENT)
Dept: RADIOLOGY | Age: 79
Discharge: HOME | End: 2024-12-11
Payer: COMMERCIAL

## 2024-12-11 VITALS — HEIGHT: 66 IN | WEIGHT: 193 LBS | BODY MASS INDEX: 31.02 KG/M2

## 2024-12-11 DIAGNOSIS — Z12.31 VISIT FOR SCREENING MAMMOGRAM: ICD-10-CM

## 2024-12-11 PROCEDURE — 77067 SCR MAMMO BI INCL CAD: CPT

## 2024-12-11 PROCEDURE — 77063 BREAST TOMOSYNTHESIS BI: CPT

## 2025-03-11 ENCOUNTER — OFFICE VISIT (OUTPATIENT)
Dept: OBGYN CLINIC | Facility: HOSPITAL | Age: 80
End: 2025-03-11
Payer: COMMERCIAL

## 2025-03-11 VITALS — BODY MASS INDEX: 31.02 KG/M2 | HEIGHT: 66 IN | WEIGHT: 193 LBS

## 2025-03-11 DIAGNOSIS — M17.0 PRIMARY OSTEOARTHRITIS OF BOTH KNEES: Primary | ICD-10-CM

## 2025-03-11 PROCEDURE — 99213 OFFICE O/P EST LOW 20 MIN: CPT | Performed by: ORTHOPAEDIC SURGERY

## 2025-03-11 NOTE — PROGRESS NOTES
Encounter Provider: Maximiliano Shine MD   Encounter Date: 03/11/25  Encounter department: [facility] [place]         ASSESSMENT & PLAN:  Assessment & Plan  Primary osteoarthritis of both knees  Patient finds meaningful and lasting relief from knee pain with periodic visco-supplement injections  Bilateral knee visco-supplement was ordered as this had provided significant decrease of pain, inflammation and crepitus with last application.  The patient has on-going knee pain and stiffness in which interferes with their daily activity and sleep.  Examination of knee includes crepitus with range of motion.  The patient rates their pain a 10/10 at times.  The patient has tried home exercise program learned from past physical therapy.  Bracing has not provided relief.  The patient has tried oral medications and steroid injections with limited benefit.  The patient has been counseled on weight loss.    Follow up in 3 months            To do next visit:  Return in about 3 months (around 6/11/2025) for visco-supplementation.    Scribe Attestation      I,:  Lenny Martinez MA am acting as a scribe while in the presence of the attending physician.:       I,:  Maximiliano Shine MD personally performed the services described in this documentation    as scribed in my presence.:             ____________________________________________________  CHIEF COMPLAINT:  Chief Complaint   Patient presents with    Right Knee - Follow-up    Left Knee - Follow-up         SUBJECTIVE:  Yumi Gaspar is a 79 y.o. female who presents for follow up of bilateral knees.  She is s/p bilateral Euflexxa reducing her pain from 7/10 to 1/10, 12/10/2024.  Today she complains of bilateral generalized knee pain.  Prolonged weight bearing and repetitive bending aggravates while rest alleviates.  The patient rates their pain at 7/10 and above at times.            PAST MEDICAL HISTORY:  Past Medical History:    Diagnosis Date    Basal cell carcinoma (BCC) in situ of skin     on back    GERD (gastroesophageal reflux disease)     Hyperlipidemia     Hypertension     Incisional hernia     Uterine polyp        PAST SURGICAL HISTORY:  Past Surgical History:   Procedure Laterality Date    COLOSTOMY  2011    partial, end, distal segment closure    ENTEROSTOMY CLOSURE      with anastomosis    EXAMINATION UNDER ANESTHESIA N/A 1/13/2022    Procedure: EXAM UNDER ANESTHESIA (EUA) W/ D&C HYSTEROSCOPY SYMPHION;  Surgeon: Astrid Melton DO;  [facility];  Service: Gynecology    HERNIA REPAIR  2012/2013/2014    HERNIA REPAIR      Pt states with a strangled hernia    HIP SURGERY Right 2010    HIP SURGERY Left 2011    INCISIONAL HERNIA REPAIR      Incarcerated    NM DILATION VAGINA W/ANESTHESIA OTHER THAN LOCAL N/A 12/14/2020    Procedure: EXAM UNDER ANESTHESIA (EUA);  Surgeon: Astrid Melton DO;  [facility];  Service: Gynecology    NM HYSTEROSCOPY BX ENDOMETRIUM&/POLYPC W/WO D&C N/A 12/14/2020    Procedure: DILATATION AND CURETTAGE (D&C) WITH HYSTEROSCOPY;  Surgeon: Astrid Melton DO;  [facility];  Service: Gynecology    NM HYSTEROSCOPY REMOVAL LEIOMYOMATA N/A 12/14/2020    Procedure: endometrial polypectomy;  Surgeon: Astrid Melton DO;  [facility];  Service: Gynecology    TOTAL HIP ARTHROPLASTY Bilateral     2011, 2012       FAMILY HISTORY:  Family History   Problem Relation Age of Onset    Heart disease Mother     Coronary artery disease Mother     Stroke Mother     Hypertension Mother     Heart failure Father     Coronary artery disease Father     No Known Problems Maternal Grandmother     No Known Problems Maternal Grandfather     No Known Problems Paternal Grandmother     No Known Problems Paternal Grandfather     Skin cancer Son     Skin cancer Son     Colon cancer Maternal Uncle 60       SOCIAL HISTORY:  Social History     Tobacco Use    Smoking status: Former    Smokeless  "tobacco: Never    Tobacco comments:     Pt states with hx 40 years ago   Vaping Use    Vaping status: Never Used   Substance Use Topics    Alcohol use: Yes     Comment: weekly, occasional use    Drug use: Never       MEDICATIONS:    Current Outpatient Medications:     amLODIPine (NORVASC) 5 mg tablet, Take 5 mg by mouth daily, Disp: , Rfl: 3    Ascorbic Acid (VITAMIN C PO), Take 1 tablet by mouth daily, Disp: , Rfl:     B Complex Vitamins (VITAMIN B COMPLEX) TABS, Take 1 tablet by mouth daily, Disp: , Rfl:     CALCIUM PO, Take by mouth, Disp: , Rfl:     Cholecalciferol 50 MCG (2000 UT) CAPS, Take by mouth daily, Disp: , Rfl:     Coenzyme Q10 (CO Q 10 PO), Take 1 tablet by mouth daily , Disp: , Rfl:     escitalopram (LEXAPRO) 5 mg tablet, Take 5 mg by mouth daily, Disp: , Rfl:     MAGNESIUM PO, Take by mouth, Disp: , Rfl:     Probiotic Product (ALIGN PO), Take 1 tablet by mouth daily , Disp: , Rfl:     rosuvastatin (CRESTOR) 5 mg tablet, Take 5 mg by mouth every other day, Disp: , Rfl:     ALPRAZolam (XANAX) 0.25 mg tablet, TAKE 1 TO 2 TABLETS BY MOUTH 30 MINUTES PRIOR TO PROCEDURE (Patient not taking: Reported on 3/11/2025), Disp: , Rfl:     tobramycin-dexamethasone (TOBRADEX) ophthalmic suspension, , Disp: , Rfl:     ALLERGIES:  No Known Allergies    LABS:  HgA1c:   Lab Results   Component Value Date    HGBA1C 5.5 12/10/2024     BMP:   Lab Results   Component Value Date    GLUCOSE 109 03/16/2015    CALCIUM 9.1 12/10/2024     03/16/2015    K 4.2 12/10/2024    CO2 31 12/10/2024     12/10/2024    BUN 17 12/10/2024    CREATININE 0.78 12/10/2024     CBC: No components found for: \"CBC\"    _____________________________________________________  PHYSICAL EXAMINATION:  Vital signs: Ht 5' 6\" (1.676 m)   Wt 87.5 kg (193 lb)   BMI 31.15 kg/m²   General: No acute distress, awake and alert  Psychiatric: Mood and affect appear appropriate  HEENT: Trachea Midline, No torticollis, no apparent facial " "trauma  Cardiovascular: No audible murmurs; Extremities appear perfused  Pulmonary: No audible wheezing or stridor  Skin: No open lesions; see further details (if any) below    Ortho Exam:  Bilateral knees:  No erythema or ecchymosis  No effusion or swelling  Normal strength  Good ROM with crepitus   Calf compartments soft and supple  Sensation intact  Toes are warm sensate and mobile          _____________________________________________________  STUDIES REVIEWED:    None performed     PROCEDURES PERFORMED:  Procedures      None Preformed       Portions of the record may have been created with voice recognition software.  Occasional wrong word or \"sound a like\" substitutions may have occurred due to the inherent limitations of voice recognition software.  Read the chart carefully and recognize, using context, where substitutions have occurred.        "

## 2025-05-05 ENCOUNTER — HOSPITAL ENCOUNTER (EMERGENCY)
Facility: HOSPITAL | Age: 80
Discharge: HOME | End: 2025-05-05
Attending: EMERGENCY MEDICINE
Payer: COMMERCIAL

## 2025-05-05 VITALS
TEMPERATURE: 98.1 F | SYSTOLIC BLOOD PRESSURE: 181 MMHG | HEART RATE: 74 BPM | RESPIRATION RATE: 18 BRPM | OXYGEN SATURATION: 94 % | DIASTOLIC BLOOD PRESSURE: 85 MMHG

## 2025-05-05 DIAGNOSIS — R10.9 ABDOMINAL PAIN: Primary | ICD-10-CM

## 2025-05-05 LAB
ALBUMIN SERPL BCG-MCNC: 3.8 G/DL (ref 3.5–5)
ALP SERPL-CCNC: 55 U/L (ref 34–104)
ALT SERPL W P-5'-P-CCNC: 14 U/L (ref 7–52)
ANION GAP SERPL CALCULATED.3IONS-SCNC: 6 MMOL/L (ref 4–13)
AST SERPL W P-5'-P-CCNC: 17 U/L (ref 13–39)
BASOPHILS # BLD AUTO: 0.05 THOUSANDS/ÂΜL (ref 0–0.1)
BASOPHILS NFR BLD AUTO: 1 % (ref 0–1)
BILIRUB SERPL-MCNC: 1.16 MG/DL (ref 0.2–1)
BUN SERPL-MCNC: 14 MG/DL (ref 5–25)
CALCIUM SERPL-MCNC: 8.8 MG/DL (ref 8.4–10.2)
CHLORIDE SERPL-SCNC: 106 MMOL/L (ref 96–108)
CO2 SERPL-SCNC: 27 MMOL/L (ref 21–32)
CREAT SERPL-MCNC: 0.71 MG/DL (ref 0.6–1.3)
EOSINOPHIL # BLD AUTO: 0.25 THOUSAND/ÂΜL (ref 0–0.61)
EOSINOPHIL NFR BLD AUTO: 4 % (ref 0–6)
ERYTHROCYTE [DISTWIDTH] IN BLOOD BY AUTOMATED COUNT: 13.4 % (ref 11.6–15.1)
GFR SERPL CREATININE-BSD FRML MDRD: 81 ML/MIN/1.73SQ M
GLUCOSE SERPL-MCNC: 108 MG/DL (ref 65–140)
HCT VFR BLD AUTO: 40 % (ref 34.8–46.1)
HGB BLD-MCNC: 13.1 G/DL (ref 11.5–15.4)
IMM GRANULOCYTES # BLD AUTO: 0.02 THOUSAND/UL (ref 0–0.2)
IMM GRANULOCYTES NFR BLD AUTO: 0 % (ref 0–2)
LIPASE SERPL-CCNC: 16 U/L (ref 11–82)
LYMPHOCYTES # BLD AUTO: 1.49 THOUSANDS/ÂΜL (ref 0.6–4.47)
LYMPHOCYTES NFR BLD AUTO: 22 % (ref 14–44)
MCH RBC QN AUTO: 32.4 PG (ref 26.8–34.3)
MCHC RBC AUTO-ENTMCNC: 32.8 G/DL (ref 31.4–37.4)
MCV RBC AUTO: 99 FL (ref 82–98)
MONOCYTES # BLD AUTO: 0.62 THOUSAND/ÂΜL (ref 0.17–1.22)
MONOCYTES NFR BLD AUTO: 9 % (ref 4–12)
NEUTROPHILS # BLD AUTO: 4.39 THOUSANDS/ÂΜL (ref 1.85–7.62)
NEUTS SEG NFR BLD AUTO: 64 % (ref 43–75)
NRBC BLD AUTO-RTO: 0 /100 WBCS
PLATELET # BLD AUTO: 218 THOUSANDS/UL (ref 149–390)
PMV BLD AUTO: 9.2 FL (ref 8.9–12.7)
POTASSIUM SERPL-SCNC: 4.2 MMOL/L (ref 3.5–5.3)
PROT SERPL-MCNC: 6.5 G/DL (ref 6.4–8.4)
RBC # BLD AUTO: 4.04 MILLION/UL (ref 3.81–5.12)
SODIUM SERPL-SCNC: 139 MMOL/L (ref 135–147)
WBC # BLD AUTO: 6.82 THOUSAND/UL (ref 4.31–10.16)

## 2025-05-05 PROCEDURE — 85025 COMPLETE CBC W/AUTO DIFF WBC: CPT | Performed by: EMERGENCY MEDICINE

## 2025-05-05 PROCEDURE — 83690 ASSAY OF LIPASE: CPT | Performed by: EMERGENCY MEDICINE

## 2025-05-05 PROCEDURE — 36415 COLL VENOUS BLD VENIPUNCTURE: CPT | Performed by: EMERGENCY MEDICINE

## 2025-05-05 PROCEDURE — 99284 EMERGENCY DEPT VISIT MOD MDM: CPT

## 2025-05-05 PROCEDURE — 80053 COMPREHEN METABOLIC PANEL: CPT | Performed by: EMERGENCY MEDICINE

## 2025-05-05 PROCEDURE — 99284 EMERGENCY DEPT VISIT MOD MDM: CPT | Performed by: EMERGENCY MEDICINE

## 2025-05-05 NOTE — ED PROVIDER NOTES
Time reflects when diagnosis was documented in both MDM as applicable and the Disposition within this note       Time User Action Codes Description Comment    5/5/2025  6:28 AM Bárbara Sandhu Add [R10.9] Abdominal pain           ED Disposition       ED Disposition   Discharge    Condition   Stable    Date/Time   Mon May 5, 2025  6:28 AM    Comment   Yumi Gaspar discharge to home/self care.                   Assessment & Plan       Medical Decision Making  Yumi Gaspar is a 79 y.o. who presents with complaints of abdominal pain     Vital signs are hypertensive, otherwise HD stable, afebrile    Ddx: pain at site of incisional hernia  Doubt strangulation, incarceration based on PE    Plan: blood work as below WNL  Imaging not indicated given complete resolution of pain and benign abdominal exam   Recommend follow up with Dr. Alcantar as scheduled tomorrow  Supportive care instructions and return precautions provided  Patient understands and is agreeable to plan    Disposition: patient stable for discharge home.       Amount and/or Complexity of Data Reviewed  Labs: ordered.             Medications - No data to display    ED Risk Strat Scores                    (ISAR) Identification of Seniors at Risk  Before the illness or injury that brought you to the Emergency, did you need someone to help you on a regular basis?: 0  In the last 24 hours, have you needed more help than usual?: 0  Have you been hospitalized for one or more nights during the past 6 months?: 0  In general, do you see well?: 0  In general, do you have serious problems with your memory?: 0  Do you take more than three different medications every day?: 1  ISAR Score: 1            SBIRT 22yo+      Flowsheet Row Most Recent Value   JEANNE: How many times in the past year have you...    Used an illegal drug or used a prescription medication for non-medical reasons? Never Filed at: 05/05/2025 050                            History of Present  Illness       Chief Complaint   Patient presents with    Abdominal Pain     Pt c/o mid abdominal pain x 1 day.  Hx of diverticulitis, perforated bowel, and hernia.       Past Medical History:   Diagnosis Date    Basal cell carcinoma (BCC) in situ of skin     on back    GERD (gastroesophageal reflux disease)     Hyperlipidemia     Hypertension     Incisional hernia     Uterine polyp       Past Surgical History:   Procedure Laterality Date    COLOSTOMY  2011    partial, end, distal segment closure    ENTEROSTOMY CLOSURE      with anastomosis    EXAMINATION UNDER ANESTHESIA N/A 1/13/2022    Procedure: EXAM UNDER ANESTHESIA (EUA) W/ D&C HYSTEROSCOPY SYMPHION;  Surgeon: Astrid Melton DO;  [facility];  Service: Gynecology    HERNIA REPAIR  2012/2013/2014    HERNIA REPAIR      Pt states with a strangled hernia    HIP SURGERY Right 2010    HIP SURGERY Left 2011    INCISIONAL HERNIA REPAIR      Incarcerated    ID DILATION VAGINA W/ANESTHESIA OTHER THAN LOCAL N/A 12/14/2020    Procedure: EXAM UNDER ANESTHESIA (EUA);  Surgeon: Astrid Melton DO;  [facility];  Service: Gynecology    ID HYSTEROSCOPY BX ENDOMETRIUM&/POLYPC W/WO D&C N/A 12/14/2020    Procedure: DILATATION AND CURETTAGE (D&C) WITH HYSTEROSCOPY;  Surgeon: Astrid Melton DO;  [facility];  Service: Gynecology    ID HYSTEROSCOPY REMOVAL LEIOMYOMATA N/A 12/14/2020    Procedure: endometrial polypectomy;  Surgeon: Astrid Melton DO;  [facility];  Service: Gynecology    TOTAL HIP ARTHROPLASTY Bilateral     2011, 2012      Family History   Problem Relation Age of Onset    Heart disease Mother     Coronary artery disease Mother     Stroke Mother     Hypertension Mother     Heart failure Father     Coronary artery disease Father     No Known Problems Maternal Grandmother     No Known Problems Maternal Grandfather     No Known Problems Paternal Grandmother     No Known Problems Paternal Grandfather     Skin cancer Son      Skin cancer Son     Colon cancer Maternal Uncle 60      Social History     Tobacco Use    Smoking status: Former    Smokeless tobacco: Never    Tobacco comments:     Pt states with hx 40 years ago   Vaping Use    Vaping status: Never Used   Substance Use Topics    Alcohol use: Yes     Comment: weekly, occasional use    Drug use: Never      E-Cigarette/Vaping    E-Cigarette Use Never User       E-Cigarette/Vaping Substances    Nicotine No     THC No     CBD No     Flavoring No     Other No       I have reviewed and agree with the history as documented.     Patient is a 79-year-old female with pertinent past medical history of bowel perforation secondary to diverticulitis status post bowel resection with residual incisional hernia and incarcerated hernia status post operative repair.  She presents for evaluation of of abdominal pain.  She states that she woke up from sleep this morning with pain that started in left lower quadrant at site of known incisional hernia and radiated across her upper abdomen into the right side.  States pain was burning in quality.  Endorses nausea without vomiting.  States that she was able to get up, ambulate, and shower prior to coming to the emergency department.  Denies fever, chills, chest pain, shortness of breath, dysuria, hematuria, diarrhea, constipation, or blood in stool.  States that pain resolved upon arriving to the emergency department without any medications prior to arrival.  States that she has been having intermittent pain at hernia site and was seen by PCP last week.  Was referred to surgery for outpatient evaluation, and has appointment on 5/6.        Review of Systems   Gastrointestinal:  Positive for abdominal pain.   All other systems reviewed and are negative.          Objective       ED Triage Vitals [05/05/25 0457]   Temperature Pulse Blood Pressure Respirations SpO2 Patient Position - Orthostatic VS   98.1 °F (36.7 °C) 74 (!) 181/85 18 94 % Sitting      Temp  Source Heart Rate Source BP Location FiO2 (%) Pain Score    Temporal Monitor Left arm -- 2      Vitals      Date and Time Temp Pulse SpO2 Resp BP Pain Score FACES Pain Rating User   05/05/25 0457 98.1 °F (36.7 °C) 74 94 % 18 181/85 2 -- [place]            Physical Exam  Constitutional:       General: She is not in acute distress.     Appearance: She is well-developed. She is obese. She is not ill-appearing, toxic-appearing or diaphoretic.   HENT:      Head: Normocephalic and atraumatic.      Nose: Nose normal.      Mouth/Throat:      Mouth: Mucous membranes are moist.      Pharynx: Oropharynx is clear.   Eyes:      Extraocular Movements: Extraocular movements intact.      Conjunctiva/sclera: Conjunctivae normal.   Cardiovascular:      Rate and Rhythm: Normal rate and regular rhythm.      Heart sounds: Normal heart sounds.   Pulmonary:      Effort: Pulmonary effort is normal.      Breath sounds: Normal breath sounds.   Abdominal:      General: Abdomen is flat. There is no distension.      Palpations: Abdomen is soft.      Tenderness: There is no abdominal tenderness. There is no guarding or rebound.      Hernia: A hernia (left sided incisional hernia. soft, compressible) is present.      Comments: Well healed surgical scars on abdomen     Musculoskeletal:         General: Normal range of motion.      Cervical back: Normal range of motion and neck supple.   Skin:     General: Skin is warm and dry.      Coloration: Skin is not jaundiced.   Neurological:      Mental Status: She is alert and oriented to person, place, and time.   Psychiatric:         Mood and Affect: Mood normal.         Behavior: Behavior normal.         Results Reviewed       Procedure Component Value Units Date/Time    Comprehensive metabolic panel [[id number]]  (Abnormal) Collected: 05/05/25 0556    Lab Status: Final result Specimen: Blood from Line, Venous Updated: 05/05/25 0625     Sodium 139 mmol/L      Potassium 4.2 mmol/L      Chloride 106 mmol/L       CO2 27 mmol/L      ANION GAP 6 mmol/L      BUN 14 mg/dL      Creatinine 0.71 mg/dL      Glucose 108 mg/dL      Calcium 8.8 mg/dL      AST 17 U/L      ALT 14 U/L      Alkaline Phosphatase 55 U/L      Total Protein 6.5 g/dL      Albumin 3.8 g/dL      Total Bilirubin 1.16 mg/dL      eGFR 81 ml/min/1.73sq m     Narrative:      National Kidney Disease Foundation guidelines for Chronic Kidney Disease (CKD):     Stage 1 with normal or high GFR (GFR > 90 mL/min/1.73 square meters)    Stage 2 Mild CKD (GFR = 60-89 mL/min/1.73 square meters)    Stage 3A Moderate CKD (GFR = 45-59 mL/min/1.73 square meters)    Stage 3B Moderate CKD (GFR = 30-44 mL/min/1.73 square meters)    Stage 4 Severe CKD (GFR = 15-29 mL/min/1.73 square meters)    Stage 5 End Stage CKD (GFR <15 mL/min/1.73 square meters)  Note: GFR calculation is accurate only with a steady state creatinine    Lipase [[id number]]  (Normal) Collected: 05/05/25 0539    Lab Status: Final result Specimen: Blood from Line, Venous Updated: 05/05/25 0614     Lipase 16 u/L     CBC and differential [[id number]]  (Abnormal) Collected: 05/05/25 0539    Lab Status: Final result Specimen: Blood from Line, Venous Updated: 05/05/25 0549     WBC 6.82 Thousand/uL      RBC 4.04 Million/uL      Hemoglobin 13.1 g/dL      Hematocrit 40.0 %      MCV 99 fL      MCH 32.4 pg      MCHC 32.8 g/dL      RDW 13.4 %      MPV 9.2 fL      Platelets 218 Thousands/uL      nRBC 0 /100 WBCs      Segmented % 64 %      Immature Grans % 0 %      Lymphocytes % 22 %      Monocytes % 9 %      Eosinophils Relative 4 %      Basophils Relative 1 %      Absolute Neutrophils 4.39 Thousands/µL      Absolute Immature Grans 0.02 Thousand/uL      Absolute Lymphocytes 1.49 Thousands/µL      Absolute Monocytes 0.62 Thousand/µL      Eosinophils Absolute 0.25 Thousand/µL      Basophils Absolute 0.05 Thousands/µL             No orders to display       Procedures    ED Medication and Procedure Management   Prior to Admission  Medications   Prescriptions Last Dose Informant Patient Reported? Taking?   ALPRAZolam (XANAX) 0.25 mg tablet   Yes No   Sig: TAKE 1 TO 2 TABLETS BY MOUTH 30 MINUTES PRIOR TO PROCEDURE   Patient not taking: Reported on 3/11/2025   Ascorbic Acid (VITAMIN C PO)  Self Yes No   Sig: Take 1 tablet by mouth daily   B Complex Vitamins (VITAMIN B COMPLEX) TABS  Self Yes No   Sig: Take 1 tablet by mouth daily   CALCIUM PO   Yes No   Sig: Take by mouth   Cholecalciferol 50 MCG (2000 UT) CAPS  Self Yes No   Sig: Take by mouth daily   Coenzyme Q10 (CO Q 10 PO)  Self Yes No   Sig: Take 1 tablet by mouth daily    MAGNESIUM PO   Yes No   Sig: Take by mouth   Probiotic Product (ALIGN PO)  Self Yes No   Sig: Take 1 tablet by mouth daily    amLODIPine (NORVASC) 5 mg tablet  Self Yes No   Sig: Take 5 mg by mouth daily   escitalopram (LEXAPRO) 5 mg tablet   Yes No   Sig: Take 5 mg by mouth daily   rosuvastatin (CRESTOR) 5 mg tablet  Self Yes No   Sig: Take 5 mg by mouth every other day   tobramycin-dexamethasone (TOBRADEX) ophthalmic suspension   Yes No   Patient not taking: Reported on 3/11/2025      Facility-Administered Medications: None     Discharge Medication List as of 5/5/2025  6:29 AM        CONTINUE these medications which have NOT CHANGED    Details   ALPRAZolam (XANAX) 0.25 mg tablet TAKE 1 TO 2 TABLETS BY MOUTH 30 MINUTES PRIOR TO PROCEDURE, Historical Med      amLODIPine (NORVASC) 5 mg tablet Take 5 mg by mouth daily, Starting Thu 4/11/2019, Historical Med      Ascorbic Acid (VITAMIN C PO) Take 1 tablet by mouth daily, Historical Med      B Complex Vitamins (VITAMIN B COMPLEX) TABS Take 1 tablet by mouth daily, Historical Med      CALCIUM PO Take by mouth, Historical Med      Cholecalciferol 50 MCG (2000 UT) CAPS Take by mouth daily, Historical Med      Coenzyme Q10 (CO Q 10 PO) Take 1 tablet by mouth daily , Historical Med      escitalopram (LEXAPRO) 5 mg tablet Take 5 mg by mouth daily, Historical Med      MAGNESIUM PO  Take by mouth, Historical Med      Probiotic Product (ALIGN PO) Take 1 tablet by mouth daily , Historical Med      rosuvastatin (CRESTOR) 5 mg tablet Take 5 mg by mouth every other day, Starting Fri 12/17/2021, Historical Med      tobramycin-dexamethasone (TOBRADEX) ophthalmic suspension Historical Med           No discharge procedures on file.  ED SEPSIS DOCUMENTATION   Time reflects when diagnosis was documented in both MDM as applicable and the Disposition within this note       Time User Action Codes Description Comment    5/5/2025  6:28 AM Bárbara Sandhu Add [R10.9] Abdominal pain                  Bárbara Sandhu MD  05/05/25 0652

## 2025-05-05 NOTE — DISCHARGE INSTRUCTIONS
Patient Education     Abdominal Pain, Adult ED   General Information   You came to the Emergency Department (ED) for abdominal or belly pain. The doctor feels that the risk of a serious cause for your belly pain is low.  Many things can cause belly pain. Some are serious things like bleeding or an infection. Less serious things, like an upset stomach, can also cause belly pain.  The doctors may not be able to find all serious causes of belly pain the first time they see you. It is important that you follow up with your doctor. You may be waiting on some test results. The staff will notify you if there are concerning results.  What care is needed at home?   Call your regular doctor to let them know you were in the ED. Make a follow-up appointment if you were told to.  Keep a diary about your pain to help your doctor learn more about the cause. Write down the foods you eat to see if they may be the cause of your pain. Also write down what you were doing before and during the pain.  Eat small meals more often. Eat more fiber if hard stools are a problem.  Avoid foods or drinks that make your pain worse. Some people are bothered by:  Drinks that are fizzy or have caffeine.  Fried, greasy, or fatty foods.  Orange juice.  Milk or cheese can bother some people’s stomach as well.  When you have pain, you can:  Try to have a bowel movement.  Lie down and rest.  Avoid solid foods for a few hours. If you are hungry, try liquids like broth or water. When you feel better, try mild foods like rice, crackers, bananas, applesauce, or toast.  Don’t take over-the-counter medicines, such as antacids or laxatives, unless they are ordered.  Check with the doctor before you take any herbal medicines or supplements.  When do I need to get emergency help?   Call for an ambulance right away if:   You have sudden severe belly pain, or the pain is constant.  You have trouble breathing or chest pain along with your belly pain.  You start  throwing up blood or pass a lot of blood in your stool.  Your belly becomes very hard or swollen.  You get a fever 102.2°F (39°C) or higher or shaking chills.  Return to the ED if:   You have signs of severe fluid loss, such as:  No urine for more than 8 hours.  You feel very light-headed or like you are going to pass out.  You feel weak, like you are going to fall.  Your pain gets worse, comes more often or moves to one area of the belly  You have an upset stomach or throwing up that isn’t getting better and are having trouble keeping down food and drink.  Your stools are black or tar colored.  When do I need to call the doctor?   If the pain is not gone or getting better in 1 to 2 days.  You have a fever of 100.4°F (38°C) or higher, chills.  You develop early signs of fluid loss, such as:  Your urine is very dark colored.  Your mouth is dry.  You have muscle cramps.  You have a lack of energy.  You feel light-headed when you get up.  You have pain with passing urine or have blood in your urine.  Your stools have a small amount (less than 1 teaspoon or 5 mL) of blood in them.  You feel that something is not right in your belly.  You have new or worsening symptoms.  Last Reviewed Date   2021-05-07  Consumer Information Use and Disclaimer   This generalized information is a limited summary of diagnosis, treatment, and/or medication information. It is not meant to be comprehensive and should be used as a tool to help the user understand and/or assess potential diagnostic and treatment options. It does NOT include all information about conditions, treatments, medications, side effects, or risks that may apply to a specific patient. It is not intended to be medical advice or a substitute for the medical advice, diagnosis, or treatment of a health care provider based on the health care provider's examination and assessment of a patient’s specific and unique circumstances. Patients must speak with a health care provider  for complete information about their health, medical questions, and treatment options, including any risks or benefits regarding use of medications. This information does not endorse any treatments or medications as safe, effective, or approved for treating a specific patient. [employer] and its affiliates disclaim any warranty or liability relating to this information or the use thereof. The use of this information is governed by the Terms of Use, available at https://www.woltersSpotRightuwer.com/en/know/clinical-effectiveness-terms   Copyright   Copyright © 2024 [employer] and its affiliates and/or licensors. All rights reserved.

## 2025-05-05 NOTE — ED ATTENDING ATTESTATION
5/5/2025  I, Giuseppe Hurtado MD, saw and evaluated the patient. I have discussed the patient with the resident/non-physician practitioner and agree with the resident's/non-physician practitioner's findings, Plan of Care, and MDM as documented in the resident's/non-physician practitioner's note, except where noted. All available labs and Radiology studies were reviewed.  I was present for key portions of any procedure(s) performed by the resident/non-physician practitioner and I was immediately available to provide assistance.       At this point I agree with the current assessment done in the Emergency Department.  I have conducted an independent evaluation of this patient a history and physical is as follows:      Final Diagnosis:  1. Abdominal pain      Chief Complaint   Patient presents with    Abdominal Pain     Pt c/o mid abdominal pain x 1 day.  Hx of diverticulitis, perforated bowel, and hernia.           A:  - 79-year-old female who presents with abdominal pain.      P:  - given the presentation, will check CBC for marked leukocytosis  - CMP for liver enzyme elevation that could signal cholecystitis, biliary obstructive disease. Check RFTs for [place] / markers of dehydration.  - Lipase given abdominal pain to evaluate specifically for pancreatitis.  -Given that her symptoms have subsided and her abdominal exam is unremarkable, would not pursue CT imaging at this time unless blood work indicates further investigation.  - Disposition per workup.        H:    79-year-old female who presents with abdominal pain.  Patient woke up around 4 AM this morning and was experiencing pain across her abdomen located around the periumbilical region.  This was associated with some nausea.  Pain is described as pinching.  He did have a normal bowel movement this morning.  Pain has since subsided since arriving in the emergency department.  Patient has a complex surgical history.  She went to see her family doctor a couple of  weeks ago and was describing that she was experiencing some intermittent left-sided abdominal discomfort.  Family doctor recommended that she make an appointment with her surgeon, Dr. Alcantar.  Patient has an appoint with him this afternoon.      PMH:  Past Medical History:   Diagnosis Date    Basal cell carcinoma (BCC) in situ of skin     on back    GERD (gastroesophageal reflux disease)     Hyperlipidemia     Hypertension     Incisional hernia     Uterine polyp        PSH:  Past Surgical History:   Procedure Laterality Date    COLOSTOMY  2011    partial, end, distal segment closure    ENTEROSTOMY CLOSURE      with anastomosis    EXAMINATION UNDER ANESTHESIA N/A 1/13/2022    Procedure: EXAM UNDER ANESTHESIA (EUA) W/ D&C HYSTEROSCOPY SYMPHION;  Surgeon: Astrid Melton DO;  [facility];  Service: Gynecology    HERNIA REPAIR  2012/2013/2014    HERNIA REPAIR      Pt states with a strangled hernia    HIP SURGERY Right 2010    HIP SURGERY Left 2011    INCISIONAL HERNIA REPAIR      Incarcerated    CT DILATION VAGINA W/ANESTHESIA OTHER THAN LOCAL N/A 12/14/2020    Procedure: EXAM UNDER ANESTHESIA (EUA);  Surgeon: Astrid Melton DO;  [facility];  Service: Gynecology    CT HYSTEROSCOPY BX ENDOMETRIUM&/POLYPC W/WO D&C N/A 12/14/2020    Procedure: DILATATION AND CURETTAGE (D&C) WITH HYSTEROSCOPY;  Surgeon: Astrid Melton DO;  [facility];  Service: Gynecology    CT HYSTEROSCOPY REMOVAL LEIOMYOMATA N/A 12/14/2020    Procedure: endometrial polypectomy;  Surgeon: Astrid Melton DO;  [facility];  Service: Gynecology    TOTAL HIP ARTHROPLASTY Bilateral     2011, 2012         PE:   Vitals:    05/05/25 0457   BP: (!) 181/85   BP Location: Left arm   Pulse: 74   Resp: 18   Temp: 98.1 °F (36.7 °C)   TempSrc: Temporal   SpO2: 94%         Constitutional: Vital signs are normal. She appears well-developed. She is cooperative. No distress.   Cardiovascular: Normal rate, regular rhythm,  normal heart sounds.   No murmur heard.  Pulmonary/Chest: Effort normal and breath sounds normal.   Abdominal: Soft.  Surgical scars present.  bowel sounds are normal. There is no tenderness. There is no rebound, no guarding.   Neurological: She is alert.   Skin: Skin is warm, dry and intact.   Psychiatric: She has a normal mood and affect. Her speech is normal and behavior is normal. Thought content normal.          - 13 point ROS was performed and all are normal unless stated in the history above.   - Nursing note reviewed. Vitals reviewed.   - Orders placed by myself and/or advanced practitioner / resident.    - Previous chart was reviewed  - No language barrier.   - History obtained from patient.   - There are no limitations to the history obtained. Reasons ROS could not be obtained:  N/A         Medications - No data to display  No orders to display     Orders Placed This Encounter   Procedures    CBC and differential    Comprehensive metabolic panel    Lipase    Insert peripheral IV     Labs Reviewed   CBC AND DIFFERENTIAL - Abnormal       Result Value Ref Range Status    WBC 6.82  4.31 - 10.16 Thousand/uL Final    RBC 4.04  3.81 - 5.12 Million/uL Final    Hemoglobin 13.1  11.5 - 15.4 g/dL Final    Hematocrit 40.0  34.8 - 46.1 % Final    MCV 99 (*) 82 - 98 fL Final    MCH 32.4  26.8 - 34.3 pg Final    MCHC 32.8  31.4 - 37.4 g/dL Final    RDW 13.4  11.6 - 15.1 % Final    MPV 9.2  8.9 - 12.7 fL Final    Platelets 218  149 - 390 Thousands/uL Final    nRBC 0  /100 WBCs Final    Segmented % 64  43 - 75 % Final    Immature Grans % 0  0 - 2 % Final    Lymphocytes % 22  14 - 44 % Final    Monocytes % 9  4 - 12 % Final    Eosinophils Relative 4  0 - 6 % Final    Basophils Relative 1  0 - 1 % Final    Absolute Neutrophils 4.39  1.85 - 7.62 Thousands/µL Final    Absolute Immature Grans 0.02  0.00 - 0.20 Thousand/uL Final    Absolute Lymphocytes 1.49  0.60 - 4.47 Thousands/µL Final    Absolute Monocytes 0.62  0.17 - 1.22  Thousand/µL Final    Eosinophils Absolute 0.25  0.00 - 0.61 Thousand/µL Final    Basophils Absolute 0.05  0.00 - 0.10 Thousands/µL Final   COMPREHENSIVE METABOLIC PANEL - Abnormal    Sodium 139  135 - 147 mmol/L Final    Potassium 4.2  3.5 - 5.3 mmol/L Final    Chloride 106  96 - 108 mmol/L Final    CO2 27  21 - 32 mmol/L Final    ANION GAP 6  4 - 13 mmol/L Final    BUN 14  5 - 25 mg/dL Final    Creatinine 0.71  0.60 - 1.30 mg/dL Final    Comment: Standardized to IDMS reference method    Glucose 108  65 - 140 mg/dL Final    Comment: If the patient is fasting, the ADA then defines impaired fasting glucose as > 100 mg/dL and diabetes as > or equal to 123 mg/dL.    Calcium 8.8  8.4 - 10.2 mg/dL Final    AST 17  13 - 39 U/L Final    ALT 14  7 - 52 U/L Final    Comment: Specimen collection should occur prior to Sulfasalazine administration due to the potential for falsely depressed results.     Alkaline Phosphatase 55  34 - 104 U/L Final    Total Protein 6.5  6.4 - 8.4 g/dL Final    Albumin 3.8  3.5 - 5.0 g/dL Final    Total Bilirubin 1.16 (*) 0.20 - 1.00 mg/dL Final    Comment: Use of this assay is not recommended for patients undergoing treatment with eltrombopag due to the potential for falsely elevated results.  N-acetyl-p-benzoquinone imine (metabolite of Acetaminophen) will generate erroneously low results in samples for patients that have taken an overdose of Acetaminophen.    eGFR 81  ml/min/1.73sq m Final    Narrative:     National Kidney Disease Foundation guidelines for Chronic Kidney Disease (CKD):     Stage 1 with normal or high GFR (GFR > 90 mL/min/1.73 square meters)    Stage 2 Mild CKD (GFR = 60-89 mL/min/1.73 square meters)    Stage 3A Moderate CKD (GFR = 45-59 mL/min/1.73 square meters)    Stage 3B Moderate CKD (GFR = 30-44 mL/min/1.73 square meters)    Stage 4 Severe CKD (GFR = 15-29 mL/min/1.73 square meters)    Stage 5 End Stage CKD (GFR <15 mL/min/1.73 square meters)  Note: GFR calculation is  accurate only with a steady state creatinine   LIPASE - Normal    Lipase 16  11 - 82 u/L Final     Time reflects when diagnosis was documented in both MDM as applicable and the Disposition within this note       Time User Action Codes Description Comment    5/5/2025  6:28 AM Bárbara Sandhu [R10.9] Abdominal pain           ED Disposition       ED Disposition   Discharge    Condition   Stable    Date/Time   Mon May 5, 2025  6:28 AM    Comment   Yumi Gaspar discharge to home/self care.                   Follow-up Information       Follow up With Specialties Details Why Contact Info Additional Information    [facility] Emergency Medicine Go to  If symptoms worsen [address] [facility] [id number] [phone] [facility], [address] [place], [place], [address]   [phone]    Smith Alcantar MD General Surgery Go on 5/6/2025 as scheduled for reevaluation [address] [place] [id number] [phone]             Patient's Medications   Discharge Prescriptions    No medications on file     No discharge procedures on file.  Prior to Admission Medications   Prescriptions Last Dose Informant Patient Reported? Taking?   ALPRAZolam (XANAX) 0.25 mg tablet   Yes No   Sig: TAKE 1 TO 2 TABLETS BY MOUTH 30 MINUTES PRIOR TO PROCEDURE   Patient not taking: Reported on 3/11/2025   Ascorbic Acid (VITAMIN C PO)  Self Yes No   Sig: Take 1 tablet by mouth daily   B Complex Vitamins (VITAMIN B COMPLEX) TABS  Self Yes No   Sig: Take 1 tablet by mouth daily   CALCIUM PO   Yes No   Sig: Take by mouth   Cholecalciferol 50 MCG (2000 UT) CAPS  Self Yes No   Sig: Take by mouth daily   Coenzyme Q10 (CO Q 10 PO)  Self Yes No   Sig: Take 1 tablet by mouth daily    MAGNESIUM PO   Yes No   Sig: Take by mouth   Probiotic Product (ALIGN PO)  Self Yes No   Sig: Take 1 tablet by mouth daily    amLODIPine  "(NORVASC) 5 mg tablet  Self Yes No   Sig: Take 5 mg by mouth daily   escitalopram (LEXAPRO) 5 mg tablet   Yes No   Sig: Take 5 mg by mouth daily   rosuvastatin (CRESTOR) 5 mg tablet  Self Yes No   Sig: Take 5 mg by mouth every other day   tobramycin-dexamethasone (TOBRADEX) ophthalmic suspension   Yes No   Patient not taking: Reported on 3/11/2025      Facility-Administered Medications: None       Portions of the record may have been created with voice recognition software. Occasional wrong word or \"sound a like\" substitutions may have occurred due to the inherent limitations of voice recognition software. Read the chart carefully and recognize, using context, where substitutions have occurred.       ED Course         Critical Care Time  Procedures      "

## 2025-05-06 ENCOUNTER — OFFICE VISIT (OUTPATIENT)
Dept: SURGERY | Facility: CLINIC | Age: 80
End: 2025-05-06
Payer: COMMERCIAL

## 2025-05-06 VITALS
SYSTOLIC BLOOD PRESSURE: 126 MMHG | DIASTOLIC BLOOD PRESSURE: 83 MMHG | HEART RATE: 75 BPM | OXYGEN SATURATION: 96 % | HEIGHT: 67 IN | BODY MASS INDEX: 29.98 KG/M2 | WEIGHT: 191 LBS

## 2025-05-06 DIAGNOSIS — T14.8XXA MUSCULOSKELETAL STRAIN: Primary | ICD-10-CM

## 2025-05-06 PROCEDURE — 99204 OFFICE O/P NEW MOD 45 MIN: CPT | Performed by: SURGERY

## 2025-05-06 NOTE — PROGRESS NOTES
Name: Yumi Gaspar      : 1945      MRN: [id number]  Encounter Provider: Smith Alcantar MD  Encounter Date: 2025   Encounter department: [facility]  :  Assessment & Plan  Musculoskeletal strain             History of Present Illness   HPI  Yumi Gaspar is a 79 y.o. female who presents for ventral hernia consult.  States she has had a bulge on her abdominal incision for approximately 13 years.  She has intermittent sharp burning pain.  History of ventral hernia repair 13 years ago and she states she noticed the bulge 2 1/2 weeks after the surgery.    Review of a CAT scan from 2024 was completed with the patient.  No evidence for incisional hernia in the left lower quadrant at the previous colostomy site.                  No acute abnormality identified on noncontrast CT abdomen pelvis.  Heterogeneous appearance of the right hepatic lobe/segment 5. This is nonspecific and can be artifactual. Recommend further evaluation with ultrasound abdomen.  Additional findings as described above.    Examination she does have mild protuberance of the fatty tissue above the ostomy incision site.  No excess bulging is noted on Valsalva or coughing.    I explained that she may have a musculoskeletal strain.  I do not feel herniation on her exam.  If her pain worsens, I recommended that we repeat her CAT scan since it is 1-year-old.  She is relieved with this assessment.    CAT scan did also mention a possible small right inguinal hernia.  There is no evidence for right inguinal hernia on exam.  Observation is recommended.      Review of Systems   Constitutional: Negative.  Negative for activity change.   HENT: Negative.     Eyes: Negative.    Respiratory: Negative.     Cardiovascular: Negative.    Gastrointestinal:  Positive for abdominal pain.   Endocrine: Negative.    Genitourinary: Negative.    Musculoskeletal: Negative.    Skin: Negative.    Allergic/Immunologic:  "Negative.    Neurological: Negative.    Psychiatric/Behavioral:  Negative for agitation, behavioral problems and confusion. The patient is not nervous/anxious.           Objective   /83   Pulse 75   Ht 5' 6.5\" (1.689 m)   Wt 86.6 kg (191 lb)   SpO2 96%   BMI 30.37 kg/m²      Physical Exam  Vitals and nursing note reviewed.   Constitutional:       General: She is not in acute distress.     Appearance: She is well-developed.   HENT:      Head: Normocephalic and atraumatic.   Eyes:      Conjunctiva/sclera: Conjunctivae normal.   Cardiovascular:      Rate and Rhythm: Normal rate.   Pulmonary:      Effort: Pulmonary effort is normal.   Abdominal:      Palpations: Abdomen is soft.      Tenderness: There is no abdominal tenderness.      Hernia: No hernia is present.   Musculoskeletal:         General: No swelling.      Cervical back: Neck supple.   Skin:     General: Skin is warm and dry.   Neurological:      Mental Status: She is alert.   Psychiatric:         Mood and Affect: Mood normal.           "

## 2025-07-08 ENCOUNTER — PROCEDURE VISIT (OUTPATIENT)
Dept: OBGYN CLINIC | Facility: HOSPITAL | Age: 80
End: 2025-07-08
Payer: COMMERCIAL

## 2025-07-08 DIAGNOSIS — G89.29 CHRONIC PAIN OF BOTH KNEES: Primary | ICD-10-CM

## 2025-07-08 DIAGNOSIS — M25.562 CHRONIC PAIN OF BOTH KNEES: Primary | ICD-10-CM

## 2025-07-08 DIAGNOSIS — M25.561 CHRONIC PAIN OF BOTH KNEES: Primary | ICD-10-CM

## 2025-07-08 DIAGNOSIS — M17.0 PRIMARY OSTEOARTHRITIS OF BOTH KNEES: ICD-10-CM

## 2025-07-08 PROCEDURE — 20610 DRAIN/INJ JOINT/BURSA W/O US: CPT | Performed by: ORTHOPAEDIC SURGERY

## 2025-07-08 RX ORDER — LIDOCAINE HYDROCHLORIDE 10 MG/ML
4 INJECTION, SOLUTION INFILTRATION; PERINEURAL
Status: COMPLETED | OUTPATIENT
Start: 2025-07-08 | End: 2025-07-08

## 2025-07-08 RX ADMIN — LIDOCAINE HYDROCHLORIDE 4 ML: 10 INJECTION, SOLUTION INFILTRATION; PERINEURAL at 15:45

## 2025-07-08 NOTE — PROGRESS NOTES
Encounter Provider: Maximiliano Shine MD   Encounter Date: 07/08/25  Encounter department: [facility] [place]         ASSESSMENT & PLAN:  Assessment & Plan  Chronic pain of both knees  Primary osteoarthritis of both knees  The patient was provided with 1st bilateral Euflexxa (RP).  The patient tolerated the procedure well.    The patient should follow up in one week       Other orders    Large joint arthrocentesis    Large joint arthrocentesis      To do next visit:  Return in about 1 week (around 7/15/2025) for 2nd bilateral Euflexxa.    Scribe Attestation      I,:  Lenny Martinez MA am acting as a scribe while in the presence of the attending physician.:       I,:  Maximiliano Shine MD personally performed the services described in this documentation    as scribed in my presence.:             ____________________________________________________  CHIEF COMPLAINT:  Chief Complaint   Patient presents with    Right Knee - Follow-up     Patient states that when walking a lot her right knee gets really stiff     Left Knee - Follow-up         SUBJECTIVE:  Yumi Gaspar is a 79 y.o. female who presents for follow up of bilateral knees and 1st Euflexxa.  Today she complains of return of bilateral generalized knee pain.  Prolonged weight bearing and repetitive bending aggravates while rest alleviates.  The patient rates their pain at 7/10 and above at times.     She also has left dorsal foot swelling with very minimal pain.  She has recently returned from [place] including tremendous amount of walking.        PAST MEDICAL HISTORY:  Past Medical History[1]    PAST SURGICAL HISTORY:  Past Surgical History[2]    FAMILY HISTORY:  Family History[3]    SOCIAL HISTORY:  Social History[4]    MEDICATIONS:  Current Medications[5]    ALLERGIES:  Allergies[6]    LABS:  HgA1c:   Lab Results   Component Value Date    HGBA1C 5.5 12/10/2024     BMP:   Lab Results   Component Value Date     "GLUCOSE 109 03/16/2015    CALCIUM 8.8 05/05/2025     03/16/2015    K 4.2 05/05/2025    CO2 27 05/05/2025     05/05/2025    BUN 14 05/05/2025    CREATININE 0.71 05/05/2025     CBC: No components found for: \"CBC\"    _____________________________________________________  PHYSICAL EXAMINATION:  Vital signs: There were no vitals taken for this visit.  General: No acute distress, awake and alert  Psychiatric: Mood and affect appear appropriate  HEENT: Trachea Midline, No torticollis, no apparent facial trauma  Cardiovascular: No audible murmurs; Extremities appear perfused  Pulmonary: No audible wheezing or stridor  Skin: No open lesions; see further details (if any) below    Ortho Exam:  Bilateral knees:  No erythema or ecchymosis  No effusion or swelling  Normal strength  Good ROM with crepitus   Calf compartments soft and supple  Sensation intact  Toes are warm sensate and mobile    Left foot:  No erythema or ecchymosis  Mild dorsal swelling  Non-tender to palpation  Sensation intact throughout      _____________________________________________________  STUDIES REVIEWED:    None performed     PROCEDURES PERFORMED:  Large joint arthrocentesis: R knee    Performed by: Maximiliano Shine MD  Authorized by: Maximiliano Shine MD    Universal Protocol:  Consent: Verbal consent obtained  Risks and benefits: risks, benefits and alternatives were discussed  Consent given by: patient  Time out: Immediately prior to procedure a \"time out\" was called to verify the correct patient, procedure, equipment, support staff and site/side marked as required.  Timeout called at: 7/8/2025 4:01 PM.  Patient understanding: patient states understanding of the procedure being performed  Patient consent: the patient's understanding of the procedure matches consent given  Site marked: the operative site was marked  Patient identity confirmed: verbally with patient  Supporting Documentation  Indications: pain     Is this a " "Visco injection? Yes  Non-Pharmacologic Treatments Attempted: Home Exercise, Weight Management Counseling, Diet and Physical Therapy  Pharmacologic Treatments Attempted: Oral Tylenol, NSAIDs and steroid inejctions  Pain Score: 7Procedure Details  Location: knee - R knee  Preparation: Patient was prepped and draped in the usual sterile fashion  Needle size: 22 G  Ultrasound guidance: no  Approach: anterolateral  Medications administered: 4 mL lidocaine 1 %; 20 mg Sodium Hyaluronate (Viscosup) 20 MG/2ML    Patient tolerance: patient tolerated the procedure well with no immediate complications  Dressing:  Sterile dressing applied      Large joint arthrocentesis: L knee    Performed by: Maximiliano Shine MD  Authorized by: Maximiliano Shine MD    Universal Protocol:  Consent: Verbal consent obtained  Risks and benefits: risks, benefits and alternatives were discussed  Consent given by: patient  Time out: Immediately prior to procedure a \"time out\" was called to verify the correct patient, procedure, equipment, support staff and site/side marked as required.  Timeout called at: 7/8/2025 4:02 PM.  Patient understanding: patient states understanding of the procedure being performed  Patient consent: the patient's understanding of the procedure matches consent given  Site marked: the operative site was marked  Patient identity confirmed: verbally with patient  Supporting Documentation  Indications: pain     Is this a Visco injection? Yes  Non-Pharmacologic Treatments Attempted: Home Exercise, Weight Management Counseling, Diet and Physical Therapy  Pharmacologic Treatments Attempted: Oral Tylenol, NSAIDs and steroid inejctions  Pain Score: 7Procedure Details  Location: knee - L knee  Preparation: Patient was prepped and draped in the usual sterile fashion  Needle size: 22 G  Ultrasound guidance: no  Approach: anterolateral  Medications administered: 4 mL lidocaine 1 %; 20 mg Sodium Hyaluronate (Viscosup) 20 " "MG/2ML    Patient tolerance: patient tolerated the procedure well with no immediate complications  Dressing:  Sterile dressing applied              Portions of the record may have been created with voice recognition software.  Occasional wrong word or \"sound a like\" substitutions may have occurred due to the inherent limitations of voice recognition software.  Read the chart carefully and recognize, using context, where substitutions have occurred.             [1]   Past Medical History:  Diagnosis Date    Basal cell carcinoma (BCC) in situ of skin     on back    Diverticulitis of colon 2005    GERD (gastroesophageal reflux disease)     Hyperlipidemia     Hypertension     Incisional hernia     Obesity Birth    Uterine polyp    [2]   Past Surgical History:  Procedure Laterality Date    COLON SURGERY  2005    COLONOSCOPY  2018    COLOSTOMY  2011    partial, end, distal segment closure    ENTEROSTOMY CLOSURE      with anastomosis    EXAMINATION UNDER ANESTHESIA N/A 01/13/2022    Procedure: EXAM UNDER ANESTHESIA (EUA) W/ D&C HYSTEROSCOPY SYMPHION;  Surgeon: Astrid Melton DO;  [facility];  Service: Gynecology    HERNIA REPAIR  2012/2013/2014    HERNIA REPAIR      Pt states with a strangled hernia    HIP SURGERY Right 2010    HIP SURGERY Left 2011    INCISIONAL HERNIA REPAIR      Incarcerated    LA DILATION VAGINA W/ANESTHESIA OTHER THAN LOCAL N/A 12/14/2020    Procedure: EXAM UNDER ANESTHESIA (EUA);  Surgeon: Astrid Melton DO;  [facility];  Service: Gynecology    LA HYSTEROSCOPY BX ENDOMETRIUM&/POLYPC W/WO D&C N/A 12/14/2020    Procedure: DILATATION AND CURETTAGE (D&C) WITH HYSTEROSCOPY;  Surgeon: Astrid Melton DO;  [facility];  Service: Gynecology    LA HYSTEROSCOPY REMOVAL LEIOMYOMATA N/A 12/14/2020    Procedure: endometrial polypectomy;  Surgeon: Atsrid Melton DO;  [facility];  Service: Gynecology    TOTAL HIP ARTHROPLASTY Bilateral     2011, 2012   [3]   Family " History  Problem Relation Name Age of Onset    Heart disease Mother Mother     Coronary artery disease Mother Mother     Stroke Mother Mother     Hypertension Mother Mother     Heart failure Father Father     Coronary artery disease Father Father     Heart disease Father Father     No Known Problems Maternal Grandmother      No Known Problems Maternal Grandfather      No Known Problems Paternal Grandmother      No Known Problems Paternal Grandfather      Skin cancer Son      Skin cancer Son      Colon cancer Maternal Uncle Maternal uncle 60   [4]   Social History  Tobacco Use    Smoking status: Former     Current packs/day: 0.00     Average packs/day: 1.5 packs/day for 25.0 years (37.5 ttl pk-yrs)     Types: Cigarettes     Start date: 8/10/1960     Quit date: 8/10/1985     Years since quittin.9    Smokeless tobacco: Never    Tobacco comments:     Pt states with hx 40 years ago   Vaping Use    Vaping status: Never Used   Substance Use Topics    Alcohol use: Yes     Alcohol/week: 4.0 standard drinks of alcohol     Types: 3 Glasses of wine, 1 Standard drinks or equivalent per week     Comment: Socially    Drug use: Never   [5]   Current Outpatient Medications:     amLODIPine (NORVASC) 5 mg tablet, Take 5 mg by mouth in the morning., Disp: , Rfl: 3    Ascorbic Acid (VITAMIN C PO), Take 1 tablet by mouth in the morning., Disp: , Rfl:     B Complex Vitamins (VITAMIN B COMPLEX) TABS, Take 1 tablet by mouth in the morning., Disp: , Rfl:     CALCIUM PO, Take by mouth, Disp: , Rfl:     Cholecalciferol 50 MCG (2000) CAPS, Take by mouth in the morning., Disp: , Rfl:     Coenzyme Q10 (CO Q 10 PO), Take 1 tablet by mouth in the morning., Disp: , Rfl:     escitalopram (LEXAPRO) 5 mg tablet, Take 5 mg by mouth in the morning., Disp: , Rfl:     MAGNESIUM PO, Take by mouth, Disp: , Rfl:     rosuvastatin (CRESTOR) 5 mg tablet, Take 5 mg by mouth every other day, Disp: , Rfl:   [6] No Known Allergies

## 2025-07-15 ENCOUNTER — PROCEDURE VISIT (OUTPATIENT)
Dept: OBGYN CLINIC | Facility: HOSPITAL | Age: 80
End: 2025-07-15
Payer: COMMERCIAL

## 2025-07-15 VITALS — BODY MASS INDEX: 30.37 KG/M2 | HEIGHT: 67 IN

## 2025-07-15 DIAGNOSIS — G89.29 CHRONIC PAIN OF BOTH KNEES: Primary | ICD-10-CM

## 2025-07-15 DIAGNOSIS — M25.561 CHRONIC PAIN OF BOTH KNEES: Primary | ICD-10-CM

## 2025-07-15 DIAGNOSIS — M17.0 PRIMARY OSTEOARTHRITIS OF BOTH KNEES: ICD-10-CM

## 2025-07-15 DIAGNOSIS — M25.562 CHRONIC PAIN OF BOTH KNEES: Primary | ICD-10-CM

## 2025-07-15 PROCEDURE — 20610 DRAIN/INJ JOINT/BURSA W/O US: CPT | Performed by: ORTHOPAEDIC SURGERY

## 2025-07-15 RX ORDER — LIDOCAINE HYDROCHLORIDE 10 MG/ML
4 INJECTION, SOLUTION INFILTRATION; PERINEURAL
Status: COMPLETED | OUTPATIENT
Start: 2025-07-15 | End: 2025-07-15

## 2025-07-15 RX ADMIN — LIDOCAINE HYDROCHLORIDE 4 ML: 10 INJECTION, SOLUTION INFILTRATION; PERINEURAL at 15:45

## 2025-07-22 ENCOUNTER — PROCEDURE VISIT (OUTPATIENT)
Dept: OBGYN CLINIC | Facility: HOSPITAL | Age: 80
End: 2025-07-22
Payer: COMMERCIAL

## 2025-07-22 VITALS — BODY MASS INDEX: 30.37 KG/M2 | HEIGHT: 67 IN

## 2025-07-22 DIAGNOSIS — M17.0 PRIMARY OSTEOARTHRITIS OF BOTH KNEES: Primary | ICD-10-CM

## 2025-07-22 PROCEDURE — 20610 DRAIN/INJ JOINT/BURSA W/O US: CPT | Performed by: ORTHOPAEDIC SURGERY

## 2025-07-22 NOTE — PROGRESS NOTES
"Name: Yumi Gaspar      : 1945       MRN: [id number]   Encounter Provider: Maximiliano Shine MD   Encounter Date: 25  Encounter [facility]     ASSESSMENT & PLAN:  Assessment & Plan  Primary osteoarthritis of both knees            Patient has bilateral knee osteoarthritis and presents today for injection #3 for bilateral knee viscosupplementation  Patient was offered and accepted bilateral knee viscosupplementation injections  We will see the patient back on an as-needed basis or if her symptoms flareup again    To do next visit:  No follow-ups on file.    _____________________________________________________  CHIEF COMPLAINT:  Chief Complaint   Patient presents with    Left Knee - Follow-up     Euflexxa #3    Right Knee - Follow-up     Euflexxa #3         SUBJECTIVE:  Yumi Gaspar is a 79 y.o. female who presents for visit #3 of her bilateral knee viscosupplementation injections.  Patient is doing well.  She reports having improvement of pain following her most recent injections.  She has recently returned from her trip to [place].  She says that her knees feel stiff and sore sometimes.  She is ambulating without assistive devices.        PAST MEDICAL HISTORY:  Past Medical History[1]    PAST SURGICAL HISTORY:  Past Surgical History[2]    FAMILY HISTORY:  Family History[3]    SOCIAL HISTORY:  Social History[4]    MEDICATIONS:  Current Medications[5]    ALLERGIES:  Allergies[6]    LABS:  HgA1c:   Lab Results   Component Value Date    HGBA1C 5.5 12/10/2024     BMP:   Lab Results   Component Value Date    GLUCOSE 109 2015    CALCIUM 8.8 2025     2015    K 4.2 2025    CO2 27 2025     2025    BUN 14 2025    CREATININE 0.71 2025     CBC: No components found for: \"CBC\"    _____________________________________________________  PHYSICAL EXAMINATION:  Vital signs: Ht 5' 6.5\" (1.689 " "m)   BMI 30.37 kg/m²   General: No acute distress, awake and alert  Psychiatric: Mood and affect appear appropriate  HEENT: Trachea Midline, No torticollis, no apparent facial trauma  Cardiovascular: No audible murmurs; Extremities appear perfused  Pulmonary: No audible wheezing or stridor  Skin: No open lesions; see further details (if any) below    MUSCULOSKELETAL EXAMINATION:  Ortho Exam  Respiratory:   non-labored respirations    Lymphatics:  no palpable lymph nodes    Gait:   antalgic    Neurologic:   Alert and oriented times 3  Patient with normal sensation except as noted below  Deep tendon reflexes 2+ except as noted in MSK exam      Knee Exam    Left Knee:      Inspection:  skin intact    Overall limb alignment varus    Effusion: none    ROM 0-120 without pain     Palpation: medial Joint line tenderness to palpation     Motor: 5/5 IP/Q/HS/TA/GS    Pulses: 2+ DP / 2+ PT    SILT DP/SP/S/S/TN    Right knee:     Inspection:  skin intact    Overall limb alignment varus    Effusion: none    ROM 0-120 without pain     Palpation: medial Joint line tenderness to palpation    Motor: 5/5 IP/Q/HS/TA/GS    Pulses: 2+ DP / 2+ PT    SILT DP/SP/S/S/TN      ___________________________________________________  STUDIES REVIEWED:  I personally reviewed the images obtained in office today and my independent interpretation is as follows:    None performed      PROCEDURES PERFORMED:    Large joint arthrocentesis: bilateral knee    Performed by: Maximiliano Shine MD  Authorized by: Maximiliano Shine MD    Universal Protocol:  Consent: Verbal consent obtained  Risks and benefits: risks, benefits and alternatives were discussed  Consent given by: patient  Time out: Immediately prior to procedure a \"time out\" was called to verify the correct patient, procedure, equipment, support staff and site/side marked as required.  Patient understanding: patient states understanding of the procedure being performed  Site marked: the " operative site was marked  Patient identity confirmed: verbally with patient  Supporting Documentation  Indications: pain     Is this a Visco injection? Yes  Non-Pharmacologic Treatments Attempted: Diet and Home Exercise  Pharmacologic Treatments Attempted: Diet   Home Exercise  Pain Score: 7Procedure Details  Location: knee - bilateral knee  Needle size: 22 G  Ultrasound guidance: no  Approach: lateral    Medications (Right): 20 mg Sodium Hyaluronate (Viscosup) 20 MG/2MLMedications (Left): 20 mg Sodium Hyaluronate (Viscosup) 20 MG/2ML   Patient tolerance: patient tolerated the procedure well with no immediate complications  Dressing:  Sterile dressing applied                Efraín Cervantes MD;e         [1]   Past Medical History:  Diagnosis Date    Basal cell carcinoma (BCC) in situ of skin     on back    Diverticulitis of colon 2005    GERD (gastroesophageal reflux disease)     Hyperlipidemia     Hypertension     Incisional hernia     Obesity Birth    Uterine polyp    [2]   Past Surgical History:  Procedure Laterality Date    COLON SURGERY  2005    COLONOSCOPY  2018    COLOSTOMY  2011    partial, end, distal segment closure    ENTEROSTOMY CLOSURE      with anastomosis    EXAMINATION UNDER ANESTHESIA N/A 01/13/2022    Procedure: EXAM UNDER ANESTHESIA (EUA) W/ D&C HYSTEROSCOPY SYMPHION;  Surgeon: Astrid Melton DO;  Location: [facility];  Service: Gynecology    HERNIA REPAIR  2012/2013/2014    HERNIA REPAIR      Pt states with a strangled hernia    HIP SURGERY Right 2010    HIP SURGERY Left 2011    INCISIONAL HERNIA REPAIR      Incarcerated    SC DILATION VAGINA W/ANESTHESIA OTHER THAN LOCAL N/A 12/14/2020    Procedure: EXAM UNDER ANESTHESIA (EUA);  Surgeon: Astrid Melton DO;  Location: [facility];  Service: Gynecology    SC HYSTEROSCOPY BX ENDOMETRIUM&/POLYPC W/WO D&C N/A 12/14/2020    Procedure: DILATATION AND CURETTAGE (D&C) WITH HYSTEROSCOPY;  Surgeon: Astrid Melton DO;  Location: [facility];  Service:  Gynecology    LA HYSTEROSCOPY REMOVAL LEIOMYOMATA N/A 2020    Procedure: endometrial polypectomy;  Surgeon: Astrid Melton DO;  Location: [facility];  Service: Gynecology    TOTAL HIP ARTHROPLASTY Bilateral     ,    [3]   Family History  Problem Relation Name Age of Onset    Heart disease Mother Mother     Coronary artery disease Mother Mother     Stroke Mother Mother     Hypertension Mother Mother     Heart failure Father Father     Coronary artery disease Father Father     Heart disease Father Father     No Known Problems Maternal Grandmother      No Known Problems Maternal Grandfather      No Known Problems Paternal Grandmother      No Known Problems Paternal Grandfather      Skin cancer Son      Skin cancer Son      Colon cancer Maternal Uncle Maternal uncle 60   [4]   Social History  Tobacco Use    Smoking status: Former     Current packs/day: 0.00     Average packs/day: 1.5 packs/day for 25.0 years (37.5 ttl pk-yrs)     Types: Cigarettes     Start date: 8/10/1960     Quit date: 8/10/1985     Years since quittin.9    Smokeless tobacco: Never    Tobacco comments:     Pt states with hx 40 years ago   Vaping Use    Vaping status: Never Used   Substance Use Topics    Alcohol use: Yes     Alcohol/week: 4.0 standard drinks of alcohol     Types: 3 Glasses of wine, 1 Standard drinks or equivalent per week     Comment: Socially    Drug use: Never   [5]   Current Outpatient Medications:     amLODIPine (NORVASC) 5 mg tablet, Take 5 mg by mouth in the morning., Disp: , Rfl: 3    Ascorbic Acid (VITAMIN C PO), Take 1 tablet by mouth in the morning., Disp: , Rfl:     B Complex Vitamins (VITAMIN B COMPLEX) TABS, Take 1 tablet by mouth in the morning., Disp: , Rfl:     CALCIUM PO, Take by mouth, Disp: , Rfl:     Cholecalciferol 50 MCG ( UT) CAPS, Take by mouth in the morning., Disp: , Rfl:     Coenzyme Q10 (CO Q 10 PO), Take 1 tablet by mouth in the morning., Disp: , Rfl:     escitalopram (LEXAPRO) 5  mg tablet, Take 5 mg by mouth in the morning., Disp: , Rfl:     MAGNESIUM PO, Take by mouth, Disp: , Rfl:     rosuvastatin (CRESTOR) 5 mg tablet, Take 5 mg by mouth every other day, Disp: , Rfl:   [6] No Known Allergies

## 2025-08-14 ENCOUNTER — ANNUAL EXAM (OUTPATIENT)
Dept: OBGYN CLINIC | Facility: CLINIC | Age: 80
End: 2025-08-14
Payer: COMMERCIAL

## 2025-08-26 PROBLEM — Z96.643 HISTORY OF BILATERAL TOTAL HIP ARTHROPLASTY: Status: ACTIVE | Noted: 2025-08-26

## 2025-08-26 PROBLEM — M25.551 LATERAL PAIN OF RIGHT HIP: Status: ACTIVE | Noted: 2025-08-26

## 2025-08-26 PROBLEM — M25.552 LATERAL PAIN OF LEFT HIP: Status: ACTIVE | Noted: 2025-08-26

## 2025-08-26 PROBLEM — M70.62 TROCHANTERIC BURSITIS OF BOTH HIPS: Status: ACTIVE | Noted: 2025-08-26

## 2025-08-26 PROBLEM — M70.61 TROCHANTERIC BURSITIS OF BOTH HIPS: Status: ACTIVE | Noted: 2025-08-26

## (undated) DEVICE — PVC URETHRAL CATHETER: Brand: DOVER

## (undated) DEVICE — 2000CC GUARDIAN II: Brand: GUARDIAN

## (undated) DEVICE — DEVICE MYOSURE LITE TISSUE REMOVAL HYSTEROSCOPIC

## (undated) DEVICE — SYRINGE CATH TIP 50ML

## (undated) DEVICE — BETHLEHEM UNIVERSAL MINOR VAG: Brand: CARDINAL HEALTH

## (undated) DEVICE — CONVERTORS UNDER BUTTOCKS DRAPE WITH FLUID CONTROL POUCH II: Brand: CONVERTORS

## (undated) DEVICE — MYOSURE SEAL SET

## (undated) DEVICE — MAYO STAND COVER: Brand: CONVERTORS

## (undated) DEVICE — INSUFFLATION TUBING PRIMFLO

## (undated) DEVICE — STANDARD SURGICAL GOWN, L: Brand: CONVERTORS

## (undated) DEVICE — GLOVE PI ULTRA TOUCH SZ.7.0

## (undated) DEVICE — TUBING SUCTION 5MM X 12 FT

## (undated) DEVICE — TISSUE REMOVAL SYSTEM FLUID MANAGEMENT ACCESSORIES: Brand: SYMPHION

## (undated) DEVICE — PACK FLUENT DISP

## (undated) DEVICE — TISSUE REMOVAL SYSTEM RESECTING DEVICE: Brand: SYMPHION